# Patient Record
Sex: FEMALE | Race: WHITE | NOT HISPANIC OR LATINO | Employment: OTHER | URBAN - METROPOLITAN AREA
[De-identification: names, ages, dates, MRNs, and addresses within clinical notes are randomized per-mention and may not be internally consistent; named-entity substitution may affect disease eponyms.]

---

## 2017-12-22 ENCOUNTER — HOSPITAL ENCOUNTER (INPATIENT)
Facility: HOSPITAL | Age: 82
LOS: 7 days | Discharge: HOSPICE | DRG: 189 | End: 2017-12-29
Attending: EMERGENCY MEDICINE | Admitting: FAMILY MEDICINE
Payer: MEDICARE

## 2017-12-22 DIAGNOSIS — R07.9 CHEST PAIN: ICD-10-CM

## 2017-12-22 DIAGNOSIS — J18.9 PNEUMONIA OF LEFT LOWER LOBE DUE TO INFECTIOUS ORGANISM: Primary | ICD-10-CM

## 2017-12-22 DIAGNOSIS — J96.02 ACUTE RESPIRATORY FAILURE WITH HYPOXIA AND HYPERCARBIA: ICD-10-CM

## 2017-12-22 DIAGNOSIS — R07.89 CHEST DISCOMFORT: ICD-10-CM

## 2017-12-22 DIAGNOSIS — I50.9 CHF (CONGESTIVE HEART FAILURE): ICD-10-CM

## 2017-12-22 DIAGNOSIS — E11.00 UNCONTROLLED TYPE 2 DIABETES MELLITUS WITH HYPEROSMOLARITY WITHOUT COMA, WITH LONG-TERM CURRENT USE OF INSULIN: ICD-10-CM

## 2017-12-22 DIAGNOSIS — Z79.4 UNCONTROLLED TYPE 2 DIABETES MELLITUS WITH HYPEROSMOLARITY WITHOUT COMA, WITH LONG-TERM CURRENT USE OF INSULIN: ICD-10-CM

## 2017-12-22 DIAGNOSIS — J96.01 ACUTE RESPIRATORY FAILURE WITH HYPOXIA AND HYPERCARBIA: ICD-10-CM

## 2017-12-22 DIAGNOSIS — R79.89 ELEVATED TROPONIN: ICD-10-CM

## 2017-12-22 DIAGNOSIS — R06.02 SOB (SHORTNESS OF BREATH): ICD-10-CM

## 2017-12-22 LAB
ALBUMIN SERPL BCP-MCNC: 3 G/DL
ALLENS TEST: ABNORMAL
ALP SERPL-CCNC: 95 U/L
ALT SERPL W/O P-5'-P-CCNC: 14 U/L
AMORPH CRY URNS QL MICRO: ABNORMAL
ANION GAP SERPL CALC-SCNC: 16 MMOL/L
APTT BLDCRRT: 29.9 SEC
AST SERPL-CCNC: 34 U/L
BACTERIA #/AREA URNS HPF: ABNORMAL /HPF
BASOPHILS # BLD AUTO: 0.04 K/UL
BASOPHILS NFR BLD: 0.5 %
BILIRUB SERPL-MCNC: 0.3 MG/DL
BILIRUB UR QL STRIP: NEGATIVE
BNP SERPL-MCNC: 226 PG/ML
BUN SERPL-MCNC: 20 MG/DL
CALCIUM SERPL-MCNC: 8.5 MG/DL
CHLORIDE SERPL-SCNC: 98 MMOL/L
CLARITY UR: ABNORMAL
CO2 SERPL-SCNC: 20 MMOL/L
COLOR UR: YELLOW
CREAT SERPL-MCNC: 0.8 MG/DL
DELSYS: ABNORMAL
DIFFERENTIAL METHOD: ABNORMAL
EOSINOPHIL # BLD AUTO: 0 K/UL
EOSINOPHIL NFR BLD: 0.1 %
EP: 6
ERYTHROCYTE [DISTWIDTH] IN BLOOD BY AUTOMATED COUNT: 14.6 %
ERYTHROCYTE [SEDIMENTATION RATE] IN BLOOD BY WESTERGREN METHOD: 16 MM/H
EST. GFR  (AFRICAN AMERICAN): >60 ML/MIN/1.73 M^2
EST. GFR  (NON AFRICAN AMERICAN): >60 ML/MIN/1.73 M^2
FIO2: 50
FLUAV AG SPEC QL IA: NEGATIVE
FLUBV AG SPEC QL IA: NEGATIVE
GLUCOSE SERPL-MCNC: 272 MG/DL
GLUCOSE UR QL STRIP: ABNORMAL
GRAN CASTS #/AREA URNS LPF: 1 /LPF
HCO3 UR-SCNC: 26.6 MMOL/L (ref 24–28)
HCT VFR BLD AUTO: 42.1 %
HGB BLD-MCNC: 14.1 G/DL
HGB UR QL STRIP: ABNORMAL
HYALINE CASTS #/AREA URNS LPF: 2 /LPF
INR PPP: 0.9
IP: 10
KETONES UR QL STRIP: NEGATIVE
LACTATE SERPL-SCNC: 2 MMOL/L
LEUKOCYTE ESTERASE UR QL STRIP: NEGATIVE
LIPASE SERPL-CCNC: 6 U/L
LYMPHOCYTES # BLD AUTO: 1.1 K/UL
LYMPHOCYTES NFR BLD: 12.1 %
MAGNESIUM SERPL-MCNC: 2 MG/DL
MCH RBC QN AUTO: 30.5 PG
MCHC RBC AUTO-ENTMCNC: 33.5 G/DL
MCV RBC AUTO: 91 FL
MICROSCOPIC COMMENT: ABNORMAL
MODE: ABNORMAL
MONOCYTES # BLD AUTO: 0.5 K/UL
MONOCYTES NFR BLD: 5.5 %
NEUTROPHILS # BLD AUTO: 7.3 K/UL
NEUTROPHILS NFR BLD: 81.8 %
NITRITE UR QL STRIP: NEGATIVE
PCO2 BLDA: 52.3 MMHG (ref 35–45)
PH SMN: 7.32 [PH] (ref 7.35–7.45)
PH UR STRIP: 6 [PH] (ref 5–8)
PHOSPHATE SERPL-MCNC: 3.8 MG/DL
PLATELET # BLD AUTO: 177 K/UL
PMV BLD AUTO: 10.9 FL
PO2 BLDA: 70 MMHG (ref 80–100)
POC BE: 0 MMOL/L
POC SATURATED O2: 92 % (ref 95–100)
POTASSIUM SERPL-SCNC: 3.7 MMOL/L
PROT SERPL-MCNC: 7.2 G/DL
PROT UR QL STRIP: ABNORMAL
PROTHROMBIN TIME: 9.8 SEC
RBC # BLD AUTO: 4.62 M/UL
RBC #/AREA URNS HPF: 2 /HPF (ref 0–4)
SAMPLE: ABNORMAL
SITE: ABNORMAL
SODIUM SERPL-SCNC: 134 MMOL/L
SP GR UR STRIP: 1.02 (ref 1–1.03)
SP02: 95
SPECIMEN SOURCE: NORMAL
TROPONIN I SERPL DL<=0.01 NG/ML-MCNC: 0.1 NG/ML
TSH SERPL DL<=0.005 MIU/L-ACNC: 1.38 UIU/ML
URN SPEC COLLECT METH UR: ABNORMAL
UROBILINOGEN UR STRIP-ACNC: NEGATIVE EU/DL
WBC # BLD AUTO: 8.86 K/UL
WBC #/AREA URNS HPF: 1 /HPF (ref 0–5)

## 2017-12-22 PROCEDURE — 85025 COMPLETE CBC W/AUTO DIFF WBC: CPT

## 2017-12-22 PROCEDURE — 96365 THER/PROPH/DIAG IV INF INIT: CPT

## 2017-12-22 PROCEDURE — 84484 ASSAY OF TROPONIN QUANT: CPT

## 2017-12-22 PROCEDURE — 87400 INFLUENZA A/B EACH AG IA: CPT

## 2017-12-22 PROCEDURE — 82803 BLOOD GASES ANY COMBINATION: CPT

## 2017-12-22 PROCEDURE — 99285 EMERGENCY DEPT VISIT HI MDM: CPT

## 2017-12-22 PROCEDURE — 25000003 PHARM REV CODE 250: Performed by: EMERGENCY MEDICINE

## 2017-12-22 PROCEDURE — 96361 HYDRATE IV INFUSION ADD-ON: CPT

## 2017-12-22 PROCEDURE — 93005 ELECTROCARDIOGRAM TRACING: CPT

## 2017-12-22 PROCEDURE — 82533 TOTAL CORTISOL: CPT

## 2017-12-22 PROCEDURE — 99900035 HC TECH TIME PER 15 MIN (STAT)

## 2017-12-22 PROCEDURE — 87040 BLOOD CULTURE FOR BACTERIA: CPT | Mod: 59

## 2017-12-22 PROCEDURE — 87086 URINE CULTURE/COLONY COUNT: CPT

## 2017-12-22 PROCEDURE — 96375 TX/PRO/DX INJ NEW DRUG ADDON: CPT

## 2017-12-22 PROCEDURE — 85610 PROTHROMBIN TIME: CPT

## 2017-12-22 PROCEDURE — 81000 URINALYSIS NONAUTO W/SCOPE: CPT

## 2017-12-22 PROCEDURE — 11000001 HC ACUTE MED/SURG PRIVATE ROOM

## 2017-12-22 PROCEDURE — 36600 WITHDRAWAL OF ARTERIAL BLOOD: CPT

## 2017-12-22 PROCEDURE — 63600175 PHARM REV CODE 636 W HCPCS: Performed by: EMERGENCY MEDICINE

## 2017-12-22 PROCEDURE — 85730 THROMBOPLASTIN TIME PARTIAL: CPT

## 2017-12-22 PROCEDURE — 25000003 PHARM REV CODE 250: Performed by: NURSE PRACTITIONER

## 2017-12-22 PROCEDURE — 84443 ASSAY THYROID STIM HORMONE: CPT

## 2017-12-22 PROCEDURE — 80053 COMPREHEN METABOLIC PANEL: CPT

## 2017-12-22 PROCEDURE — 83690 ASSAY OF LIPASE: CPT

## 2017-12-22 PROCEDURE — 84100 ASSAY OF PHOSPHORUS: CPT

## 2017-12-22 PROCEDURE — 83605 ASSAY OF LACTIC ACID: CPT

## 2017-12-22 PROCEDURE — 83735 ASSAY OF MAGNESIUM: CPT

## 2017-12-22 PROCEDURE — 83880 ASSAY OF NATRIURETIC PEPTIDE: CPT

## 2017-12-22 PROCEDURE — 63600175 PHARM REV CODE 636 W HCPCS: Performed by: NURSE PRACTITIONER

## 2017-12-22 PROCEDURE — 93010 ELECTROCARDIOGRAM REPORT: CPT | Mod: ,,, | Performed by: INTERNAL MEDICINE

## 2017-12-22 RX ORDER — LEVOTHYROXINE SODIUM 100 UG/1
100 TABLET ORAL DAILY
COMMUNITY

## 2017-12-22 RX ORDER — LISINOPRIL 20 MG/1
40 TABLET ORAL NIGHTLY
Status: DISCONTINUED | OUTPATIENT
Start: 2017-12-23 | End: 2017-12-29 | Stop reason: HOSPADM

## 2017-12-22 RX ORDER — MEMANTINE HYDROCHLORIDE 5 MG/1
5 TABLET ORAL NIGHTLY
Status: DISCONTINUED | OUTPATIENT
Start: 2017-12-23 | End: 2017-12-29 | Stop reason: HOSPADM

## 2017-12-22 RX ORDER — SERTRALINE HYDROCHLORIDE 50 MG/1
50 TABLET, FILM COATED ORAL DAILY
Status: DISCONTINUED | OUTPATIENT
Start: 2017-12-23 | End: 2017-12-29 | Stop reason: HOSPADM

## 2017-12-22 RX ORDER — HYDROCHLOROTHIAZIDE 25 MG/1
25 TABLET ORAL DAILY
COMMUNITY

## 2017-12-22 RX ORDER — SIMVASTATIN 20 MG/1
20 TABLET, FILM COATED ORAL NIGHTLY
Status: DISCONTINUED | OUTPATIENT
Start: 2017-12-23 | End: 2017-12-29 | Stop reason: HOSPADM

## 2017-12-22 RX ORDER — LEVOTHYROXINE SODIUM 100 UG/1
100 TABLET ORAL
Status: DISCONTINUED | OUTPATIENT
Start: 2017-12-23 | End: 2017-12-29 | Stop reason: HOSPADM

## 2017-12-22 RX ORDER — ASPIRIN 325 MG
325 TABLET, DELAYED RELEASE (ENTERIC COATED) ORAL ONCE
Status: COMPLETED | OUTPATIENT
Start: 2017-12-22 | End: 2017-12-22

## 2017-12-22 RX ORDER — ENOXAPARIN SODIUM 100 MG/ML
40 INJECTION SUBCUTANEOUS EVERY 24 HOURS
Status: DISCONTINUED | OUTPATIENT
Start: 2017-12-23 | End: 2017-12-23

## 2017-12-22 RX ORDER — SIMVASTATIN 20 MG/1
20 TABLET, FILM COATED ORAL NIGHTLY
COMMUNITY

## 2017-12-22 RX ORDER — FUROSEMIDE 10 MG/ML
40 INJECTION INTRAMUSCULAR; INTRAVENOUS 2 TIMES DAILY
Status: DISCONTINUED | OUTPATIENT
Start: 2017-12-23 | End: 2017-12-22

## 2017-12-22 RX ORDER — FUROSEMIDE 10 MG/ML
40 INJECTION INTRAMUSCULAR; INTRAVENOUS 2 TIMES DAILY
Status: DISCONTINUED | OUTPATIENT
Start: 2017-12-23 | End: 2017-12-23

## 2017-12-22 RX ORDER — LORATADINE 10 MG/1
10 TABLET ORAL NIGHTLY
COMMUNITY

## 2017-12-22 RX ORDER — BALSALAZIDE DISODIUM 750 MG/1
750 CAPSULE ORAL 3 TIMES DAILY
COMMUNITY

## 2017-12-22 RX ORDER — AMLODIPINE BESYLATE 2.5 MG/1
5 TABLET ORAL DAILY
COMMUNITY

## 2017-12-22 RX ORDER — MEMANTINE HYDROCHLORIDE 5 MG/1
5 TABLET ORAL NIGHTLY
COMMUNITY

## 2017-12-22 RX ORDER — LISINOPRIL 40 MG/1
40 TABLET ORAL NIGHTLY
COMMUNITY

## 2017-12-22 RX ORDER — ACETAMINOPHEN 500 MG
2000 TABLET ORAL DAILY
COMMUNITY

## 2017-12-22 RX ORDER — DOXYCYCLINE HYCLATE 100 MG/1
100 TABLET, DELAYED RELEASE ORAL 2 TIMES DAILY
Status: ON HOLD | COMMUNITY
End: 2017-12-29 | Stop reason: HOSPADM

## 2017-12-22 RX ORDER — PANTOPRAZOLE SODIUM 40 MG/1
40 TABLET, DELAYED RELEASE ORAL DAILY
Status: DISCONTINUED | OUTPATIENT
Start: 2017-12-23 | End: 2017-12-29 | Stop reason: HOSPADM

## 2017-12-22 RX ORDER — INSULIN GLARGINE 100 [IU]/ML
21 INJECTION, SOLUTION SUBCUTANEOUS NIGHTLY
COMMUNITY

## 2017-12-22 RX ORDER — OMEPRAZOLE 20 MG/1
20 CAPSULE, DELAYED RELEASE ORAL 2 TIMES DAILY
COMMUNITY

## 2017-12-22 RX ORDER — SERTRALINE HYDROCHLORIDE 50 MG/1
50 TABLET, FILM COATED ORAL DAILY
COMMUNITY

## 2017-12-22 RX ORDER — FLUTICASONE PROPIONATE 110 UG/1
2 AEROSOL, METERED RESPIRATORY (INHALATION) 2 TIMES DAILY
COMMUNITY

## 2017-12-22 RX ORDER — AMLODIPINE BESYLATE 5 MG/1
5 TABLET ORAL DAILY
Status: DISCONTINUED | OUTPATIENT
Start: 2017-12-23 | End: 2017-12-24

## 2017-12-22 RX ORDER — FUROSEMIDE 10 MG/ML
40 INJECTION INTRAMUSCULAR; INTRAVENOUS ONCE
Status: COMPLETED | OUTPATIENT
Start: 2017-12-22 | End: 2017-12-22

## 2017-12-22 RX ORDER — INSULIN ASPART 100 [IU]/ML
INJECTION, SOLUTION INTRAVENOUS; SUBCUTANEOUS
COMMUNITY

## 2017-12-22 RX ADMIN — ASPIRIN 325 MG: 325 TABLET, DELAYED RELEASE ORAL at 11:12

## 2017-12-22 RX ADMIN — FUROSEMIDE 40 MG: 10 INJECTION, SOLUTION INTRAMUSCULAR; INTRAVENOUS at 11:12

## 2017-12-22 RX ADMIN — PIPERACILLIN AND TAZOBACTAM 4.5 G: 4; .5 INJECTION, POWDER, FOR SOLUTION INTRAVENOUS at 11:12

## 2017-12-22 RX ADMIN — SODIUM CHLORIDE 1000 ML: 0.9 INJECTION, SOLUTION INTRAVENOUS at 10:12

## 2017-12-23 PROBLEM — I21.4 NSTEMI (NON-ST ELEVATED MYOCARDIAL INFARCTION): Status: ACTIVE | Noted: 2017-12-22

## 2017-12-23 LAB
ALLENS TEST: ABNORMAL
ANION GAP SERPL CALC-SCNC: 11 MMOL/L
APTT BLDCRRT: 38 SEC
APTT BLDCRRT: NORMAL SEC
BASOPHILS # BLD AUTO: 0.02 K/UL
BASOPHILS NFR BLD: 0.4 %
BUN SERPL-MCNC: 20 MG/DL
CALCIUM SERPL-MCNC: 7.7 MG/DL
CHLORIDE SERPL-SCNC: 101 MMOL/L
CHOLEST SERPL-MCNC: 126 MG/DL
CHOLEST/HDLC SERPL: 2.6 {RATIO}
CK MB SERPL-MCNC: 12.7 NG/ML
CK MB SERPL-RTO: 3.7 %
CK SERPL-CCNC: 341 U/L
CK SERPL-CCNC: 341 U/L
CO2 SERPL-SCNC: 24 MMOL/L
CORTIS SERPL-MCNC: 34.6 UG/DL
CREAT SERPL-MCNC: 0.8 MG/DL
DELSYS: ABNORMAL
DIFFERENTIAL METHOD: ABNORMAL
EOSINOPHIL # BLD AUTO: 0 K/UL
EOSINOPHIL NFR BLD: 0 %
ERYTHROCYTE [DISTWIDTH] IN BLOOD BY AUTOMATED COUNT: 14.4 %
EST. GFR  (AFRICAN AMERICAN): >60 ML/MIN/1.73 M^2
EST. GFR  (NON AFRICAN AMERICAN): >60 ML/MIN/1.73 M^2
ESTIMATED AVG GLUCOSE: 157 MG/DL
ESTIMATED PA SYSTOLIC PRESSURE: 39.44
FIO2: 40
GLUCOSE SERPL-MCNC: 204 MG/DL
HBA1C MFR BLD HPLC: 7.1 %
HCO3 UR-SCNC: 27.2 MMOL/L (ref 24–28)
HCT VFR BLD AUTO: 33.1 %
HDLC SERPL-MCNC: 49 MG/DL
HDLC SERPL: 38.9 %
HGB BLD-MCNC: 10.8 G/DL
INR PPP: 1
LACTATE SERPL-SCNC: 1 MMOL/L
LDLC SERPL CALC-MCNC: 64 MG/DL
LYMPHOCYTES # BLD AUTO: 0.5 K/UL
LYMPHOCYTES NFR BLD: 9.6 %
MCH RBC QN AUTO: 29.4 PG
MCHC RBC AUTO-ENTMCNC: 32.6 G/DL
MCV RBC AUTO: 90 FL
MODE: ABNORMAL
MONOCYTES # BLD AUTO: 0.3 K/UL
MONOCYTES NFR BLD: 5.4 %
NEUTROPHILS # BLD AUTO: 4.7 K/UL
NEUTROPHILS NFR BLD: 84.6 %
NONHDLC SERPL-MCNC: 77 MG/DL
PCO2 BLDA: 43.6 MMHG (ref 35–45)
PH SMN: 7.4 [PH] (ref 7.35–7.45)
PLATELET # BLD AUTO: 156 K/UL
PMV BLD AUTO: 10 FL
PO2 BLDA: 59 MMHG (ref 80–100)
POC BE: 2 MMOL/L
POC SATURATED O2: 90 % (ref 95–100)
POCT GLUCOSE: 137 MG/DL (ref 70–110)
POCT GLUCOSE: 142 MG/DL (ref 70–110)
POCT GLUCOSE: 195 MG/DL (ref 70–110)
POTASSIUM SERPL-SCNC: 3.3 MMOL/L
PROCALCITONIN SERPL IA-MCNC: 0.57 NG/ML
PROCALCITONIN SERPL IA-MCNC: 0.62 NG/ML
PROTHROMBIN TIME: 10 SEC
RBC # BLD AUTO: 3.67 M/UL
RETIRED EF AND QEF - SEE NOTES: 60 (ref 55–65)
SAMPLE: ABNORMAL
SITE: ABNORMAL
SODIUM SERPL-SCNC: 136 MMOL/L
TRICUSPID VALVE REGURGITATION: NORMAL
TRIGL SERPL-MCNC: 65 MG/DL
TROPONIN I SERPL DL<=0.01 NG/ML-MCNC: 5.15 NG/ML
TROPONIN I SERPL DL<=0.01 NG/ML-MCNC: 5.28 NG/ML
TROPONIN I SERPL DL<=0.01 NG/ML-MCNC: 5.57 NG/ML
WBC # BLD AUTO: 5.51 K/UL

## 2017-12-23 PROCEDURE — 99900035 HC TECH TIME PER 15 MIN (STAT)

## 2017-12-23 PROCEDURE — 85025 COMPLETE CBC W/AUTO DIFF WBC: CPT

## 2017-12-23 PROCEDURE — 93005 ELECTROCARDIOGRAM TRACING: CPT

## 2017-12-23 PROCEDURE — 94640 AIRWAY INHALATION TREATMENT: CPT

## 2017-12-23 PROCEDURE — 25000003 PHARM REV CODE 250: Performed by: NURSE PRACTITIONER

## 2017-12-23 PROCEDURE — 99222 1ST HOSP IP/OBS MODERATE 55: CPT | Mod: ,,, | Performed by: INTERNAL MEDICINE

## 2017-12-23 PROCEDURE — 82553 CREATINE MB FRACTION: CPT

## 2017-12-23 PROCEDURE — 36600 WITHDRAWAL OF ARTERIAL BLOOD: CPT

## 2017-12-23 PROCEDURE — 80048 BASIC METABOLIC PNL TOTAL CA: CPT

## 2017-12-23 PROCEDURE — 25000003 PHARM REV CODE 250: Performed by: INTERNAL MEDICINE

## 2017-12-23 PROCEDURE — 93010 ELECTROCARDIOGRAM REPORT: CPT | Mod: ,,, | Performed by: INTERNAL MEDICINE

## 2017-12-23 PROCEDURE — 84145 PROCALCITONIN (PCT): CPT | Mod: 91

## 2017-12-23 PROCEDURE — 85610 PROTHROMBIN TIME: CPT

## 2017-12-23 PROCEDURE — 27000190 HC CPAP FULL FACE MASK W/VALVE

## 2017-12-23 PROCEDURE — 93306 TTE W/DOPPLER COMPLETE: CPT

## 2017-12-23 PROCEDURE — 36415 COLL VENOUS BLD VENIPUNCTURE: CPT

## 2017-12-23 PROCEDURE — 99291 CRITICAL CARE FIRST HOUR: CPT | Mod: ,,, | Performed by: INTERNAL MEDICINE

## 2017-12-23 PROCEDURE — 84484 ASSAY OF TROPONIN QUANT: CPT | Mod: 91

## 2017-12-23 PROCEDURE — 80061 LIPID PANEL: CPT

## 2017-12-23 PROCEDURE — 25000242 PHARM REV CODE 250 ALT 637 W/ HCPCS: Performed by: INTERNAL MEDICINE

## 2017-12-23 PROCEDURE — 84145 PROCALCITONIN (PCT): CPT

## 2017-12-23 PROCEDURE — 25000003 PHARM REV CODE 250

## 2017-12-23 PROCEDURE — 94660 CPAP INITIATION&MGMT: CPT

## 2017-12-23 PROCEDURE — 63600175 PHARM REV CODE 636 W HCPCS: Performed by: NURSE PRACTITIONER

## 2017-12-23 PROCEDURE — 82803 BLOOD GASES ANY COMBINATION: CPT

## 2017-12-23 PROCEDURE — 84484 ASSAY OF TROPONIN QUANT: CPT

## 2017-12-23 PROCEDURE — 93306 TTE W/DOPPLER COMPLETE: CPT | Mod: 26,,, | Performed by: INTERNAL MEDICINE

## 2017-12-23 PROCEDURE — 20000000 HC ICU ROOM

## 2017-12-23 PROCEDURE — 27000221 HC OXYGEN, UP TO 24 HOURS

## 2017-12-23 PROCEDURE — 83036 HEMOGLOBIN GLYCOSYLATED A1C: CPT

## 2017-12-23 PROCEDURE — 83605 ASSAY OF LACTIC ACID: CPT

## 2017-12-23 PROCEDURE — 85730 THROMBOPLASTIN TIME PARTIAL: CPT | Mod: 91

## 2017-12-23 RX ORDER — FUROSEMIDE 10 MG/ML
40 INJECTION INTRAMUSCULAR; INTRAVENOUS 2 TIMES DAILY
Status: COMPLETED | OUTPATIENT
Start: 2017-12-23 | End: 2017-12-24

## 2017-12-23 RX ORDER — ACETAMINOPHEN 325 MG/1
650 TABLET ORAL EVERY 6 HOURS PRN
Status: DISCONTINUED | OUTPATIENT
Start: 2017-12-23 | End: 2017-12-29 | Stop reason: HOSPADM

## 2017-12-23 RX ORDER — IBUPROFEN 200 MG
24 TABLET ORAL
Status: DISCONTINUED | OUTPATIENT
Start: 2017-12-23 | End: 2017-12-29 | Stop reason: HOSPADM

## 2017-12-23 RX ORDER — POTASSIUM CHLORIDE 20 MEQ/1
40 TABLET, EXTENDED RELEASE ORAL ONCE
Status: COMPLETED | OUTPATIENT
Start: 2017-12-23 | End: 2017-12-23

## 2017-12-23 RX ORDER — HEPARIN SODIUM,PORCINE/D5W 25000/250
16 INTRAVENOUS SOLUTION INTRAVENOUS CONTINUOUS
Status: DISCONTINUED | OUTPATIENT
Start: 2017-12-23 | End: 2017-12-25

## 2017-12-23 RX ORDER — IBUPROFEN 200 MG
16 TABLET ORAL
Status: DISCONTINUED | OUTPATIENT
Start: 2017-12-23 | End: 2017-12-29 | Stop reason: HOSPADM

## 2017-12-23 RX ORDER — FUROSEMIDE 10 MG/ML
60 INJECTION INTRAMUSCULAR; INTRAVENOUS ONCE
Status: COMPLETED | OUTPATIENT
Start: 2017-12-23 | End: 2017-12-23

## 2017-12-23 RX ORDER — POTASSIUM CHLORIDE 20 MEQ/15ML
40 SOLUTION ORAL ONCE
Status: COMPLETED | OUTPATIENT
Start: 2017-12-23 | End: 2017-12-23

## 2017-12-23 RX ORDER — INSULIN ASPART 100 [IU]/ML
1-10 INJECTION, SOLUTION INTRAVENOUS; SUBCUTANEOUS
Status: DISCONTINUED | OUTPATIENT
Start: 2017-12-23 | End: 2017-12-29 | Stop reason: HOSPADM

## 2017-12-23 RX ORDER — GLUCAGON 1 MG
1 KIT INJECTION
Status: DISCONTINUED | OUTPATIENT
Start: 2017-12-23 | End: 2017-12-29 | Stop reason: HOSPADM

## 2017-12-23 RX ORDER — IPRATROPIUM BROMIDE AND ALBUTEROL SULFATE 2.5; .5 MG/3ML; MG/3ML
3 SOLUTION RESPIRATORY (INHALATION) EVERY 6 HOURS PRN
Status: DISCONTINUED | OUTPATIENT
Start: 2017-12-23 | End: 2017-12-29 | Stop reason: HOSPADM

## 2017-12-23 RX ADMIN — LISINOPRIL 40 MG: 20 TABLET ORAL at 09:12

## 2017-12-23 RX ADMIN — MEMANTINE HYDROCHLORIDE 5 MG: 5 TABLET ORAL at 09:12

## 2017-12-23 RX ADMIN — PANTOPRAZOLE SODIUM 40 MG: 40 TABLET, DELAYED RELEASE ORAL at 08:12

## 2017-12-23 RX ADMIN — INSULIN DETEMIR 6 UNITS: 100 INJECTION, SOLUTION SUBCUTANEOUS at 10:12

## 2017-12-23 RX ADMIN — FUROSEMIDE 40 MG: 10 INJECTION, SOLUTION INTRAMUSCULAR; INTRAVENOUS at 05:12

## 2017-12-23 RX ADMIN — VANCOMYCIN HYDROCHLORIDE 1000 MG: 1 INJECTION, POWDER, LYOPHILIZED, FOR SOLUTION INTRAVENOUS at 12:12

## 2017-12-23 RX ADMIN — NITROGLYCERIN 1 INCH: 20 OINTMENT TOPICAL at 12:12

## 2017-12-23 RX ADMIN — IPRATROPIUM BROMIDE AND ALBUTEROL SULFATE 3 ML: .5; 3 SOLUTION RESPIRATORY (INHALATION) at 02:12

## 2017-12-23 RX ADMIN — AMLODIPINE BESYLATE 5 MG: 5 TABLET ORAL at 08:12

## 2017-12-23 RX ADMIN — NITROGLYCERIN 1 INCH: 20 OINTMENT TOPICAL at 05:12

## 2017-12-23 RX ADMIN — FUROSEMIDE 60 MG: 10 INJECTION, SOLUTION INTRAMUSCULAR; INTRAVENOUS at 08:12

## 2017-12-23 RX ADMIN — MOXIFLOXACIN HYDROCHLORIDE 400 MG: 400 INJECTION, SOLUTION INTRAVENOUS at 12:12

## 2017-12-23 RX ADMIN — POTASSIUM CHLORIDE 40 MEQ: 20 SOLUTION ORAL at 08:12

## 2017-12-23 RX ADMIN — HEPARIN SODIUM AND DEXTROSE 16 UNITS/KG/HR: 10000; 5 INJECTION INTRAVENOUS at 10:12

## 2017-12-23 RX ADMIN — LEVOTHYROXINE SODIUM 100 MCG: 100 TABLET ORAL at 05:12

## 2017-12-23 RX ADMIN — INSULIN DETEMIR 6 UNITS: 100 INJECTION, SOLUTION SUBCUTANEOUS at 08:12

## 2017-12-23 RX ADMIN — SERTRALINE HYDROCHLORIDE 50 MG: 50 TABLET ORAL at 08:12

## 2017-12-23 RX ADMIN — SIMVASTATIN 20 MG: 20 TABLET, FILM COATED ORAL at 09:12

## 2017-12-23 RX ADMIN — ACETAMINOPHEN 650 MG: 325 TABLET ORAL at 10:12

## 2017-12-23 NOTE — PLAN OF CARE
Problem: Patient Care Overview  Goal: Plan of Care Review  Outcome: Ongoing (interventions implemented as appropriate)  Patient remained on bipap during the night.  She has denied any chest pain during the night.  Vancomycin and Avelox given upon arrival to floor.  See MAR for medication doses and times of adminstraton.

## 2017-12-23 NOTE — HPI
Ms Reyes is 89 year old female admitted to MICU for SOB  Hx obtained from Son.  Recent SOB, chest pressure and congestion, seen [facility] after hours, CXR reviewed, given Neb and Doxycycline  Declined at home with respiratory distress, and EMS called for [facility]  Low grade fever, chest heaviness and Elevated BP SBP >190   ABGs pH7.315, CO2 52.3, PO2 70. Chest Xray: Patchy infiltrates scattered about the left lung.    The patient was placed on Bipap in ER with improvement in symptoms per patient.   No Hox of COPD   Old Dx of Sarcoidosis: lives in [place]  Is DNR per son

## 2017-12-23 NOTE — PROGRESS NOTES
Pt complaining of increased shortness of breath.  Pt repositioned in bed and placed on Bipap.  Rt at bedside with neb.  Pt reports improved work of breathing with Bipap.  Will cont to monitor.        Rodger MATTA

## 2017-12-23 NOTE — ED NOTES
Report given to Monserrat MATTA. NAD noted. RR e/u, airway open and patent. POC discussed with pt and family. Pt remains on Bipap and cardiac monitor. VSS. Will continue with admission.

## 2017-12-23 NOTE — PROGRESS NOTES
Pt's troponin elevated this am.  MD notified.  Pt denies chest pain when asked but confirms pressure if asked.  12 lead ekg obtained.  Plan of care reviewed w pt and son.    Rodger MATTA

## 2017-12-23 NOTE — ASSESSMENT & PLAN NOTE
Bilateral infiltrates appear chronic without privilege of old films  [place] more likely volume overload  Empiric abx: Moxifloxacin   Sputum and blood cultures pending  Swallow eval

## 2017-12-23 NOTE — SUBJECTIVE & OBJECTIVE
Past Medical History:   Diagnosis Date    Diabetes mellitus     High cholesterol     Hypertension     Hypothyroid     Irritable bowel disease     Neuropathy     Osteoporosis     Sarcoidosis        Past Surgical History:   Procedure Laterality Date    CHOLECYSTECTOMY      HYSTERECTOMY         Review of patient's allergies indicates:  No Known Allergies    Family History     None        Social History Main Topics    Smoking status: Never Smoker    Smokeless tobacco: Never Used    Alcohol use No    Drug use: No    Sexual activity: Not on file         Review of Systems   Constitutional: Positive for fatigue.   Eyes: Negative.    Respiratory: Positive for cough, chest tightness and shortness of breath.    Cardiovascular: Negative.    Gastrointestinal: Negative.    Endocrine: Negative.    Genitourinary: Negative.    Musculoskeletal: Negative.    Neurological: Negative.    Hematological: Negative.    Psychiatric/Behavioral: Negative.      Objective:     Vital Signs (Most Recent):  Temp: 97.8 °F (36.6 °C) (12/23/17 0705)  Pulse: (!) 59 (12/23/17 1000)  Resp: (!) 22 (12/23/17 1000)  BP: (!) 131/100 (12/23/17 1000)  SpO2: (!) 88 % (12/23/17 1000) Vital Signs (24h Range):  Temp:  [96.3 °F (35.7 °C)-98 °F (36.7 °C)] 97.8 °F (36.6 °C)  Pulse:  [51-85] 59  Resp:  [15-31] 22  SpO2:  [88 %-98 %] 88 %  BP: (100-193)/() 131/100     Weight: 71 kg (156 lb 8.4 oz)  Body mass index is 26.87 kg/m².      Intake/Output Summary (Last 24 hours) at 12/23/17 1025  Last data filed at 12/23/17 1009   Gross per 24 hour   Intake              660 ml   Output             1735 ml   Net            -1075 ml       Physical Exam   Constitutional: She is oriented to person, place, and time. She appears well-developed and well-nourished.   HENT:   Head: Normocephalic and atraumatic.   Nose: Nose normal.   Mouth/Throat: Oropharynx is clear and moist. No oropharyngeal exudate.   Eyes: Conjunctivae and EOM are normal. Pupils are equal,  round, and reactive to light. Left eye exhibits no discharge. No scleral icterus.   Neck: Normal range of motion. No JVD present. No tracheal deviation present. No thyromegaly present.   Cardiovascular: Normal rate, regular rhythm and normal heart sounds.    No murmur heard.  Pulmonary/Chest: Effort normal. She has wheezes. She has rales.   Abdominal: Soft. Bowel sounds are normal.   Genitourinary:   Genitourinary Comments: hawkins   Musculoskeletal: Normal range of motion. She exhibits no edema or deformity.   Neurological: She is alert and oriented to person, place, and time.   Skin: Skin is warm and dry. Capillary refill takes 2 to 3 seconds.   Psychiatric: She has a normal mood and affect.   Nursing note and vitals reviewed.      Vents:  Oxygen Concentration (%): 40 (12/23/17 1000)    Lines/Drains/Airways     Drain                 Urethral Catheter 12/22/17 2230 Non-latex 16 Fr. less than 1 day          Peripheral Intravenous Line                 Peripheral IV - Single Lumen 12/22/17 Right Forearm 1 day         Peripheral IV - Single Lumen 12/22/17 2210 Left Forearm less than 1 day                Significant Labs:    CBC/Anemia Profile:    Recent Labs  Lab 12/22/17 2140 12/23/17  0539   WBC 8.86 5.51   HGB 14.1 10.8*   HCT 42.1 33.1*    156   MCV 91 90   RDW 14.6* 14.4        Chemistries:    Recent Labs  Lab 12/22/17 2140 12/23/17  0539   * 136   K 3.7 3.3*   CL 98 101   CO2 20* 24   BUN 20 20   CREATININE 0.8 0.8   CALCIUM 8.5* 7.7*   ALBUMIN 3.0*  --    PROT 7.2  --    BILITOT 0.3  --    ALKPHOS 95  --    ALT 14  --    AST 34  --    MG 2.0  --    PHOS 3.8  --        ABGs:   Recent Labs  Lab 12/22/17 2151   PH 7.315*   PCO2 52.3*   HCO3 26.6   POCSATURATED 92*   BE 0     Blood Culture:   Recent Labs  Lab 12/22/17 2210   LABBLOO No Growth to date  No Growth to date     POCT Glucose:   Recent Labs  Lab 12/23/17  0545   POCTGLUCOSE 195*     Respiratory Culture: No results for input(s): GSRESP,  RESPIRATORYC in the last 48 hours.  All pertinent labs within the past 24 hours have been reviewed.    Significant Imaging:   I have reviewed and interpreted all pertinent imaging results/findings within the past 24 hours.     CXR        CXR  Normal heart size. Patchy infiltrates throughout left lung appear unchanged from prior day. There is coarsening of bronchovascular markings bilaterally. Mild patchy infiltrates in the right mid and lower lung.   Impression      No significant change. Patchy bilateral infiltrates, left greater than right.     ECHO  1 - Concentric hypertrophy.     2 - No wall motion abnormalities.     3 - Normal left ventricular systolic function (EF 60-65%).     4 - Normal right ventricular systolic function .     5 - The estimated PA systolic pressure is greater than 39 mmHg.     6 - Mild tricuspid regurgitation.

## 2017-12-23 NOTE — SUBJECTIVE & OBJECTIVE
Past Medical History:   Diagnosis Date    Diabetes mellitus     High cholesterol     Hypertension     Hypothyroid     Irritable bowel disease     Neuropathy     Osteoporosis     Sarcoidosis        Past Surgical History:   Procedure Laterality Date    CHOLECYSTECTOMY      HYSTERECTOMY         Review of patient's allergies indicates:  No Known Allergies    No current facility-administered medications on file prior to encounter.      No current outpatient prescriptions on file prior to encounter.     Family History     None        Social History Main Topics    Smoking status: Never Smoker    Smokeless tobacco: Never Used    Alcohol use No    Drug use: No    Sexual activity: Not on file     Review of Systems   All other systems reviewed and are negative.    Objective:     Vital Signs (Most Recent):  Temp: 97.8 °F (36.6 °C) (12/23/17 1105)  Pulse: 73 (12/23/17 1415)  Resp: (!) 23 (12/23/17 1415)  BP: (!) 152/33 (12/23/17 1105)  SpO2: 97 % (12/23/17 1415) Vital Signs (24h Range):  Temp:  [96.3 °F (35.7 °C)-98 °F (36.7 °C)] 97.8 °F (36.6 °C)  Pulse:  [51-85] 73  Resp:  [15-31] 23  SpO2:  [88 %-98 %] 97 %  BP: (100-193)/() 152/33     Weight: 71 kg (156 lb 8.4 oz)  Body mass index is 26.87 kg/m².    SpO2: 97 %  O2 Device (Oxygen Therapy): (S) venti mask      Intake/Output Summary (Last 24 hours) at 12/23/17 1421  Last data filed at 12/23/17 1100   Gross per 24 hour   Intake            709.7 ml   Output             1960 ml   Net          -1250.3 ml       Lines/Drains/Airways     Drain                 Urethral Catheter 12/22/17 2230 Non-latex 16 Fr. less than 1 day          Peripheral Intravenous Line                 Peripheral IV - Single Lumen 12/22/17 Right Forearm 1 day         Peripheral IV - Single Lumen 12/22/17 2210 Left Forearm less than 1 day                Physical Exam   Constitutional: She is oriented to person, place, and time. She appears well-developed and well-nourished.   HENT:   Head:  Normocephalic and atraumatic.   Eyes: Conjunctivae and EOM are normal. Pupils are equal, round, and reactive to light.   Neck: Normal range of motion. Neck supple.   Cardiovascular: Normal rate, regular rhythm, normal heart sounds and intact distal pulses.    Pulmonary/Chest: Effort normal. She has rales.   Abdominal: Soft. Bowel sounds are normal.   Musculoskeletal: Normal range of motion.   Neurological: She is alert and oriented to person, place, and time.   Skin: Skin is warm and dry.   Psychiatric: She has a normal mood and affect.   Nursing note and vitals reviewed.      Significant Labs: All pertinent lab results from the last 24 hours have been reviewed.    Significant Imaging: X-Ray: CXR: X-Ray Chest 1 View (CXR):   Results for orders placed or performed during the hospital encounter of 12/22/17   X-Ray Chest 1 View    Narrative    History: Shortness of breath    Normal heart size. Patchy infiltrates throughout left lung appear unchanged from prior day. There is coarsening of bronchovascular markings bilaterally. Mild patchy infiltrates in the right mid and lower lung.    Impression     No significant change. Patchy bilateral infiltrates, left greater than right.      Electronically signed by: IRIS MERRITT MD  Date:     12/23/17  Time:    08:55

## 2017-12-23 NOTE — HPI
"Olivia Reyes is a 89 y.o. female patient who presented to the Emergency Department for SOB. Onset gradually 1 week ago. Symptoms constant and worsening. Patient reports over past week that she has been noticing increaed SOB with excursion. Prior treatment neb, evaluation at [facility] after hours, and 1 doxycycline. The  states that the pt "deteriorated 3 hours later", which prompted the ED visit.  No mitigating or exacerbating factors reported. Associated sxs include chest pain and "low grade" fever (99.5). En route, pt was placed on CPAP by EMS.  denies any diaphoresis, dizziness, focal weakness/ numbness, cough, palpitations, leg swelling, n/v/d, and all other sxs at this time. No further complaints or concerns at this time. The patient was evaluated in the Er, CBC neg, CMP notable for hypergycemia and CO2 20. Troponin and BNP elevated. UA neg. ABGs pH7.315, CO2 52.3, PO2 70. Chest Xray: Patchy infiltrates scattered about the left lung.  Question of a mild infiltrate right lung base.  Heart size within normal limits.  Vascular calcification of aorta.  No significant bony findings. The patient was placed on Bipap in ER with improvement in symptoms per patient. The patient admitted to ICU with Bipap for Acute Resp Failure with hypoxemia and hypercap.   "

## 2017-12-23 NOTE — PLAN OF CARE
Problem: Patient Care Overview  Goal: Plan of Care Review  Outcome: Ongoing (interventions implemented as appropriate)  Pt remains on bipap at this time.  Pt resting quietly now.  Plan of care reviewed w pt and son.

## 2017-12-23 NOTE — CONSULTS
Preliminary consult note          89 y/u female with PMHx with PMhx for HTN, HLP, DM presents with CHf and possible pneumonia.   Pt with atypical CP and (+) troponin. EKG is unremarkable and echo is normal.  Recommend continue IV diuresis, IV heparin x 48 hours, asa, statin, b blockers. Would opt for conservative therapy , lexiscan stress once diuresed.

## 2017-12-23 NOTE — ED NOTES
Vancomycin and Avelox not loaded in pyxis. Pharmacy notified. Pharmacy states that they will bring it to ICU.

## 2017-12-23 NOTE — HPI
Ms. Reyes is a 89 year old female patient who presented to [facility] ED on 12/23/17 with acute respiratory failure, pneumonia, CHF, and NSTEMI.

## 2017-12-23 NOTE — HOSPITAL COURSE
Treated with lasix  On Nasal canula  Heparin GTT started  EKG reviewed    12/24: Procalcitonin 0.62 and Trop 3.316  Required BIPAP overnight , adequate diuresis  Cough rattles    12/25: Clinically and hemodynamically stable. IV ZOSYN, AVELOX.     12/26: Clinically and hemodynamically stable. Tolerated VAPOTHERM. Off IV heparin. IV ZOSYN + ZYVOX.     12/27: Clinically and hemodynamically stable. VAPOTHERM, ZOSYN, ZYVOX.    12/29: Clinically and hemodynamically stable.

## 2017-12-23 NOTE — PROGRESS NOTES
Took pt off bipap for trial and placed on NC 3lpm with MD and family at bedside. Pt said she feels better but 02 Sat dropped to 90%, bipap put back on with consent from pt, Sat 95%, pt resting quietly, family at bedside.

## 2017-12-23 NOTE — CONSULTS
[facility]  Cardiology  Consult Note    Patient Name: Olivia Reyes  MRN: [id number]  Admission Date: 12/22/2017  Hospital Length of Stay: 1 days  Code Status: DNR   Attending Provider: Polly Garcia MD   Consulting Provider: Nathan Nolasco MD  Primary Care Physician: No primary care provider on file.  Principal Problem:Acute respiratory failure with hypoxia and hypercarbia    Patient information was obtained from patient and ER records.     Consults  Subjective:     Chief Complaint:  SOB     HPI:   Consult Note  Cardiology    Consult Requested By:  Reason for Consult:     SUBJECTIVE:     History of Present Illness:  Patient is a 89 y.o. female presents with     89 y/u female with PMHx with PMhx for HTN, HLP, DM presents with CHf and possible pneumonia.   Pt with atypical CP and (+) troponin. EKG is unremarkable and echo is normal.  Recommend continue IV diuresis, IV heparin x 48 hours, asa, plavix, statin, b blockers. Would opt for conservative therapy , lexiscan stress once diuresed    Review of patient's allergies indicates:  No Known Allergies    Past Medical History:   Diagnosis Date    Diabetes mellitus     High cholesterol     Hypertension     Hypothyroid     Irritable bowel disease     Neuropathy     Osteoporosis     Sarcoidosis      Past Surgical History:   Procedure Laterality Date    CHOLECYSTECTOMY      HYSTERECTOMY       History reviewed. No pertinent family history.  Social History   Substance Use Topics    Smoking status: Never Smoker    Smokeless tobacco: Never Used    Alcohol use No        Review of Systems:  Constitutional: negative  Eyes: negative  Ears, nose, mouth, throat, and face: negative  Respiratory: negative  Cardiovascular: negative  Gastrointestinal: negative  Genitourinary:negative  Hematologic/lymphatic: negative  Musculoskeletal:negative,   Neurological: negative  Behavioral/Psych: negative  Endocrine: negative  Allergic/Immunologic:  negative    OBJECTIVE:     Vital Signs (Most Recent)  Temp: 97.8 °F (36.6 °C) (12/23/17 1105)  Pulse: 73 (12/23/17 1415)  Resp: (!) 23 (12/23/17 1415)  BP: (!) 152/33 (12/23/17 1105)  SpO2: 97 % (12/23/17 1415)    Vital Signs Range (Last 24H):  Temp:  [96.3 °F (35.7 °C)-98 °F (36.7 °C)]   Pulse:  [51-85]   Resp:  [15-31]   BP: (100-193)/()   SpO2:  [88 %-98 %]     Current Facility-Administered Medications   Medication    albuterol-ipratropium 2.5mg-0.5mg/3mL nebulizer solution 3 mL    amLODIPine tablet 5 mg    dextrose 50% injection 12.5 g    dextrose 50% injection 25 g    furosemide injection 40 mg    glucagon (human recombinant) injection 1 mg    glucose chewable tablet 16 g    glucose chewable tablet 24 g    heparin 25,000 units in dextrose 5% 250 mL (100 units/mL) bolus from bag; PRN BOLUS    heparin 25,000 units in dextrose 5% 250 mL (100 units/mL) bolus from bag; PRN BOLUS    heparin 25,000 units in dextrose 5% 250 mL (100 units/mL) infusion; FEMALE    insulin aspart pen 1-10 Units    insulin detemir pen 6 Units    levothyroxine tablet 100 mcg    lisinopril tablet 40 mg    memantine tablet 5 mg    moxifloxacin 400 mg/250 mL IVPB 400 mg    nitroGLYCERIN 2% TD oint ointment 1 inch    pantoprazole EC tablet 40 mg    sertraline tablet 50 mg    simvastatin tablet 20 mg     No current facility-administered medications on file prior to encounter.      No current outpatient prescriptions on file prior to encounter.       Physical Exam:  General appearance: alert, appears stated age and cooperative  Head: Normocephalic, without obvious abnormality, atraumatic  Eyes:  conjunctivae/corneas clear. PERRL, EOM's intact. Fundi benign.  Nose: no discharge  Throat: normal findings: tongue midline and normal  Neck: no adenopathy, no carotid bruit, no JVD, supple, symmetrical, trachea midline and thyroid not enlarged, symmetric, no tenderness/mass/nodules  Back:  no skin lesions, erythema, or  scars  Lungs:  clear to auscultation bilaterally  Chest wall: no tenderness  Heart: regular rate and rhythm, S1, S2 normal, no murmur, click, rub or gallop  Abdomen: soft, non-tender; bowel sounds normal; no masses,  no organomegaly  Extremities: extremities normal, atraumatic, no cyanosis or edema  Pulses: 2+ and symmetric  Skin: Skin color, texture, turgor normal. No rashes or lesions  Neurologic: Grossly normal    Laboratory:  Chemistry:   Lab Results   Component Value Date     12/23/2017    K 3.3 (L) 12/23/2017     12/23/2017    CO2 24 12/23/2017    BUN 20 12/23/2017    CREATININE 0.8 12/23/2017    CALCIUM 7.7 (L) 12/23/2017     Cardiac Markers:   Lab Results   Component Value Date    TROPONINI 5.573 (H) 12/23/2017     Cardiac Markers (Last 3):   Lab Results   Component Value Date    TROPONINI 5.573 (H) 12/23/2017    TROPONINI 5.284 (H) 12/23/2017    TROPONINI 0.101 (H) 12/22/2017     CBC:   Lab Results   Component Value Date    WBC 5.51 12/23/2017    HGB 10.8 (L) 12/23/2017    HCT 33.1 (L) 12/23/2017    MCV 90 12/23/2017     12/23/2017     Lipids: No results found for: CHOL, TRIG, HDL, LDLDIRECT  Coagulation:   Lab Results   Component Value Date    INR 1.0 12/23/2017    APTT 38.0 (H) 12/23/2017       Diagnostic Results:  ECG: Reviewed  X-Ray: Reviewed  US: Reviewed  CT: Reviewed  Echo: Reviewed      ASSESSMENT/PLAN:     Patient Active Problem List   Diagnosis    Pneumonia of left lower lobe due to infectious organism    Elevated troponin    Acute CHF    Acute respiratory failure with hypoxia and hypercarbia    Diabetes mellitus type 2, uncontrolled        Plan: Recommend continue IV diuresis, IV heparin x 48 hours, asa, plavix, statin, b blockers. Would opt for conservative therapy , lexiscan stress once diuresed    Past Medical History:   Diagnosis Date    Diabetes mellitus     High cholesterol     Hypertension     Hypothyroid     Irritable bowel disease     Neuropathy      Osteoporosis     Sarcoidosis        Past Surgical History:   Procedure Laterality Date    CHOLECYSTECTOMY      HYSTERECTOMY         Review of patient's allergies indicates:  No Known Allergies    No current facility-administered medications on file prior to encounter.      No current outpatient prescriptions on file prior to encounter.     Family History     None        Social History Main Topics    Smoking status: Never Smoker    Smokeless tobacco: Never Used    Alcohol use No    Drug use: No    Sexual activity: Not on file     Review of Systems   All other systems reviewed and are negative.    Objective:     Vital Signs (Most Recent):  Temp: 97.8 °F (36.6 °C) (12/23/17 1105)  Pulse: 73 (12/23/17 1415)  Resp: (!) 23 (12/23/17 1415)  BP: (!) 152/33 (12/23/17 1105)  SpO2: 97 % (12/23/17 1415) Vital Signs (24h Range):  Temp:  [96.3 °F (35.7 °C)-98 °F (36.7 °C)] 97.8 °F (36.6 °C)  Pulse:  [51-85] 73  Resp:  [15-31] 23  SpO2:  [88 %-98 %] 97 %  BP: (100-193)/() 152/33     Weight: 71 kg (156 lb 8.4 oz)  Body mass index is 26.87 kg/m².    SpO2: 97 %  O2 Device (Oxygen Therapy): (S) venti mask      Intake/Output Summary (Last 24 hours) at 12/23/17 1421  Last data filed at 12/23/17 1100   Gross per 24 hour   Intake            709.7 ml   Output             1960 ml   Net          -1250.3 ml       Lines/Drains/Airways     Drain                 Urethral Catheter 12/22/17 2230 Non-latex 16 Fr. less than 1 day          Peripheral Intravenous Line                 Peripheral IV - Single Lumen 12/22/17 Right Forearm 1 day         Peripheral IV - Single Lumen 12/22/17 2210 Left Forearm less than 1 day                Physical Exam   Constitutional: She is oriented to person, place, and time. She appears well-developed and well-nourished.   HENT:   Head: Normocephalic and atraumatic.   Eyes: Conjunctivae and EOM are normal. Pupils are equal, round, and reactive to light.   Neck: Normal range of motion. Neck supple.    Cardiovascular: Normal rate, regular rhythm, normal heart sounds and intact distal pulses.    Pulmonary/Chest: Effort normal. She has rales.   Abdominal: Soft. Bowel sounds are normal.   Musculoskeletal: Normal range of motion.   Neurological: She is alert and oriented to person, place, and time.   Skin: Skin is warm and dry.   Psychiatric: She has a normal mood and affect.   Nursing note and vitals reviewed.      Significant Labs: All pertinent lab results from the last 24 hours have been reviewed.    Significant Imaging: X-Ray: CXR: X-Ray Chest 1 View (CXR):   Results for orders placed or performed during the hospital encounter of 12/22/17   X-Ray Chest 1 View    Narrative    History: Shortness of breath    Normal heart size. Patchy infiltrates throughout left lung appear unchanged from prior day. There is coarsening of bronchovascular markings bilaterally. Mild patchy infiltrates in the right mid and lower lung.    Impression     No significant change. Patchy bilateral infiltrates, left greater than right.      Electronically signed by: IRIS MERRITT MD  Date:     12/23/17  Time:    08:55      Assessment and Plan:     Recommend continue IV diuresis, IV heparin x 48 hours, asa, plavix, statin, b blockers. Would opt for conservative therapy , lexiscan stress once diuresed    VTE Risk Mitigation         Ordered     heparin 25,000 units in dextrose 5% 250 mL (100 units/mL) infusion; FEMALE  Continuous     Route:  Intravenous        12/23/17 0743     heparin 25,000 units in dextrose 5% 250 mL (100 units/mL) bolus from bag; PRN BOLUS  As needed (PRN)     Route:  Intravenous        12/23/17 0743     heparin 25,000 units in dextrose 5% 250 mL (100 units/mL) bolus from bag; PRN BOLUS  As needed (PRN)     Route:  Intravenous        12/23/17 0743     Medium Risk of VTE  Once      12/22/17 2321     Place sequential compression device  Until discontinued      12/22/17 2321          Thank you for your consult. I will  follow-up with patient. Please contact us if you have any additional questions.    Nathan Nolasco MD  Cardiology   [facility]

## 2017-12-23 NOTE — CONSULTS
[facility] -   Critical Care Medicine  Consult Note    Patient Name: Olivia Reyes  MRN: [id number]  Admission Date: 12/22/2017  Hospital Length of Stay: 1 days  Code Status: DNR  Attending Physician: Polly Garcia MD   Primary Care Provider: No primary care provider on file.   Principal Problem: Acute respiratory failure with hypoxia and hypercarbia      Subjective:     HPI:  Ms Reyes is 89 year old female admitted to MICU for SOB  Hx obtained from Son.  Recent SOB, chest pressure and congestion, seen [facility] after hours, CXR reviewed, given Neb and Doxycycline  Declined at home with respiratory distress, and EMS called for [facility]OB  Low grade fever, chest heaviness and Elevated BP SBP >190   ABGs pH7.315, CO2 52.3, PO2 70. Chest Xray: Patchy infiltrates scattered about the left lung.    The patient was placed on Bipap in ER with improvement in symptoms per patient.   No Hox of COPD   Old Dx of Sarcoidosis: lives in [place]  Is DNR per son     Hospital/ICU Course:  Treated with lasix  On Nasal canula  Heparin GTT started  EKG reviewed      Past Medical History:   Diagnosis Date    Diabetes mellitus     High cholesterol     Hypertension     Hypothyroid     Irritable bowel disease     Neuropathy     Osteoporosis     Sarcoidosis        Past Surgical History:   Procedure Laterality Date    CHOLECYSTECTOMY      HYSTERECTOMY         Review of patient's allergies indicates:  No Known Allergies    Family History     None        Social History Main Topics    Smoking status: Never Smoker    Smokeless tobacco: Never Used    Alcohol use No    Drug use: No    Sexual activity: Not on file         Review of Systems   Constitutional: Positive for fatigue.   Eyes: Negative.    Respiratory: Positive for cough, chest tightness and shortness of breath.    Cardiovascular: Negative.    Gastrointestinal: Negative.    Endocrine: Negative.    Genitourinary: Negative.    Musculoskeletal: Negative.     Neurological: Negative.    Hematological: Negative.    Psychiatric/Behavioral: Negative.      Objective:     Vital Signs (Most Recent):  Temp: 97.8 °F (36.6 °C) (12/23/17 0705)  Pulse: (!) 59 (12/23/17 1000)  Resp: (!) 22 (12/23/17 1000)  BP: (!) 131/100 (12/23/17 1000)  SpO2: (!) 88 % (12/23/17 1000) Vital Signs (24h Range):  Temp:  [96.3 °F (35.7 °C)-98 °F (36.7 °C)] 97.8 °F (36.6 °C)  Pulse:  [51-85] 59  Resp:  [15-31] 22  SpO2:  [88 %-98 %] 88 %  BP: (100-193)/() 131/100     Weight: 71 kg (156 lb 8.4 oz)  Body mass index is 26.87 kg/m².      Intake/Output Summary (Last 24 hours) at 12/23/17 1025  Last data filed at 12/23/17 1009   Gross per 24 hour   Intake              660 ml   Output             1735 ml   Net            -1075 ml       Physical Exam   Constitutional: She is oriented to person, place, and time. She appears well-developed and well-nourished.   HENT:   Head: Normocephalic and atraumatic.   Nose: Nose normal.   Mouth/Throat: Oropharynx is clear and moist. No oropharyngeal exudate.   Eyes: Conjunctivae and EOM are normal. Pupils are equal, round, and reactive to light. Left eye exhibits no discharge. No scleral icterus.   Neck: Normal range of motion. No JVD present. No tracheal deviation present. No thyromegaly present.   Cardiovascular: Normal rate, regular rhythm and normal heart sounds.    No murmur heard.  Pulmonary/Chest: Effort normal. She has wheezes. She has rales.   Abdominal: Soft. Bowel sounds are normal.   Genitourinary:   Genitourinary Comments: hawkins   Musculoskeletal: Normal range of motion. She exhibits no edema or deformity.   Neurological: She is alert and oriented to person, place, and time.   Skin: Skin is warm and dry. Capillary refill takes 2 to 3 seconds.   Psychiatric: She has a normal mood and affect.   Nursing note and vitals reviewed.      Vents:  Oxygen Concentration (%): 40 (12/23/17 1000)    Lines/Drains/Airways     Drain                 Urethral Catheter  12/22/17 2230 Non-latex 16 Fr. less than 1 day          Peripheral Intravenous Line                 Peripheral IV - Single Lumen 12/22/17 Right Forearm 1 day         Peripheral IV - Single Lumen 12/22/17 2210 Left Forearm less than 1 day                Significant Labs:    CBC/Anemia Profile:    Recent Labs  Lab 12/22/17 2140 12/23/17  0539   WBC 8.86 5.51   HGB 14.1 10.8*   HCT 42.1 33.1*    156   MCV 91 90   RDW 14.6* 14.4        Chemistries:    Recent Labs  Lab 12/22/17 2140 12/23/17  0539   * 136   K 3.7 3.3*   CL 98 101   CO2 20* 24   BUN 20 20   CREATININE 0.8 0.8   CALCIUM 8.5* 7.7*   ALBUMIN 3.0*  --    PROT 7.2  --    BILITOT 0.3  --    ALKPHOS 95  --    ALT 14  --    AST 34  --    MG 2.0  --    PHOS 3.8  --        ABGs:   Recent Labs  Lab 12/22/17 2151   PH 7.315*   PCO2 52.3*   HCO3 26.6   POCSATURATED 92*   BE 0     Blood Culture:   Recent Labs  Lab 12/22/17 2210   LABBLOO No Growth to date  No Growth to date     POCT Glucose:   Recent Labs  Lab 12/23/17  0545   POCTGLUCOSE 195*     Respiratory Culture: No results for input(s): GSRESP, RESPIRATORYC in the last 48 hours.  All pertinent labs within the past 24 hours have been reviewed.    Significant Imaging:   I have reviewed and interpreted all pertinent imaging results/findings within the past 24 hours.     CXR        CXR  Normal heart size. Patchy infiltrates throughout left lung appear unchanged from prior day. There is coarsening of bronchovascular markings bilaterally. Mild patchy infiltrates in the right mid and lower lung.   Impression      No significant change. Patchy bilateral infiltrates, left greater than right.     ECHO  1 - Concentric hypertrophy.     2 - No wall motion abnormalities.     3 - Normal left ventricular systolic function (EF 60-65%).     4 - Normal right ventricular systolic function .     5 - The estimated PA systolic pressure is greater than 39 mmHg.     6 - Mild tricuspid regurgitation.    Assessment/Plan:      Neuro    At baseline  PO MEMANTINE  PO SERTRALINE        ENT    Hearing aid        Pulmonary   * Acute respiratory failure with hypoxia and hypercarbia    Keep sats> 92%  Repeat ABG, pCO2 now 43. No need for NIPPV  Bronchodilators  Accapella          Pneumonia of left lower lobe due to infectious organism    Bilateral infiltrates appear chronic without privilege of old films  [place] more likely volume overload  Empiric abx: Moxifloxacin   Sputum and blood cultures pending  Swallow eval        Cardiac/Vascular    Aspirin  Simvastatin        Acute CHF    Possible HFpEF  Responded with diuretics  PRELOAD AND AFTERLOAD reduction  Lisinopril 40 mg          Elevated troponin    Serial cardiac markers  EKG  NTG paste  Follow echo  Oxygen  PRN Morphine        Renal/    Strict I/O  BID lasix        ID    Culture data pending  Moxifloxacin        Hematology    Heparin GTT        Endocrine   Diabetes mellitus type 2, uncontrolled    Levothyroxine 100mcg  Moderate sliding scale Detemir insulin 6 units BID          GI    Pantoprazole EC            Critical Care Daily Checklist:    A: Awake: RASS Goal/Actual Goal: RASS Goal: 0-->alert and calm  Actual:     B: Spontaneous Breathing Trial Performed?     C: SAT & SBT Coordinated?  N/A                      D: Delirium: CAM-ICU Overall CAM-ICU: Negative   E: Early Mobility Performed? No   F: Feeding Goal:    Status:     Current Diet Order   Procedures    Diet NPO      AS: Analgesia/Sedation NONE   T: Thromboembolic Prophylaxis HEPARIN GTT   H: HOB > 300 Yes   U: Stress Ulcer Prophylaxis (if needed) PPI   G: Glucose Control Sliding scale   B: Bowel Function     I: Indwelling Catheter (Lines & Macias) Necessity     D: De-escalation of Antimicrobials/Pharmacotherapies YES    Plan for the day/ETD Improve SpO2    Code Status:  Family/Goals of Care: DNR  HOME     Critical Care Time: 45 minutes  Critical secondary to Patient has a condition that poses threat to life and bodily  function: Severe Respiratory Distress, Pulmonary congestion, Hypoxemia     Critical care was time spent personally by me on the following activities: development of treatment plan with patient or surrogate and bedside caregivers, discussions with consultants, evaluation of patient's response to treatment, examination of patient, ordering and performing treatments and interventions, ordering and review of laboratory studies, ordering and review of radiographic studies, pulse oximetry, re-evaluation of patient's condition. This critical care time did not overlap with that of any other provider or involve time for any procedures.    Thank you for your consult. I will follow-up with patient. Please contact us if you have any additional questions.     Braxton Bonilla MD  Critical Care Medicine  [facility] -

## 2017-12-23 NOTE — H&P
"[facility] Hospital Medicine  History & Physical    Patient Name: Olivia Reyes  MRN: [id number]  Admission Date: 12/22/2017  Attending Physician: Marely Salgado MD  Primary Care Provider: No primary care provider on file.         Patient information was obtained from patient, past medical records and ER records.     Subjective:     Principal Problem:Acute respiratory failure with hypoxia and hypercarbia    Chief Complaint:   Chief Complaint   Patient presents with    Shortness of Breath     sob worsening x1wk; seen at clinic today, chest xray showed fluid in lungs, given neb and sent home with antibiotics; placed on cpap enroute        HPI: Olivia Reyes is a 89 y.o. female patient who presented to the Emergency Department for SOB. Onset gradually 1 week ago. Symptoms constant and worsening. Patient reports over past week that she has been noticing increaed SOB with excursion. Prior treatment neb, evaluation at [facility] after hours, and 1 doxycycline. The  states that the pt "deteriorated 3 hours later", which prompted the ED visit.  No mitigating or exacerbating factors reported. Associated sxs include chest pain and "low grade" fever (99.5). En route, pt was placed on CPAP by EMS.  denies any diaphoresis, dizziness, focal weakness/ numbness, cough, palpitations, leg swelling, n/v/d, and all other sxs at this time. No further complaints or concerns at this time. The patient was evaluated in the Er, CBC neg, CMP notable for hypergycemia and CO2 20. Troponin and BNP elevated. UA neg. ABGs pH7.315, CO2 52.3, PO2 70. Chest Xray: Patchy infiltrates scattered about the left lung.  Question of a mild infiltrate right lung base.  Heart size within normal limits.  Vascular calcification of aorta.  No significant bony findings. The patient was placed on Bipap in ER with improvement in symptoms per patient. The patient admitted to ICU with Bipap for Acute Resp Failure with hypoxemia and hypercap. "     Past Medical History:   Diagnosis Date    Diabetes mellitus     High cholesterol     Hypertension     Hypothyroid     Irritable bowel disease     Neuropathy     Osteoporosis     Sarcoidosis        Past Surgical History:   Procedure Laterality Date    CHOLECYSTECTOMY      HYSTERECTOMY         Review of patient's allergies indicates:  No Known Allergies    No current facility-administered medications on file prior to encounter.      No current outpatient prescriptions on file prior to encounter.     Family History     Reviewed and not Pertinent         Social History Main Topics    Smoking status: Never Smoker    Smokeless tobacco: Never Used    Alcohol use No    Drug use: No    Sexual activity: Not on file     Review of Systems   Constitutional: Negative for chills, diaphoresis, fatigue and fever.   HENT: Negative for congestion, sore throat and voice change.    Eyes: Negative for photophobia and visual disturbance.   Respiratory: Positive for shortness of breath. Negative for cough, wheezing and stridor.    Cardiovascular: Negative for chest pain and leg swelling.   Gastrointestinal: Negative for abdominal distention, abdominal pain, constipation, diarrhea, nausea and vomiting.   Endocrine: Negative for polydipsia, polyphagia and polyuria.   Genitourinary: Negative for difficulty urinating, dysuria, flank pain, pelvic pain, urgency and vaginal discharge.   Musculoskeletal: Negative for back pain, joint swelling, neck pain and neck stiffness.   Skin: Negative for color change and rash.   Allergic/Immunologic: Negative for immunocompromised state.   Neurological: Negative for dizziness, syncope, weakness, numbness and headaches.   Hematological: Does not bruise/bleed easily.   Psychiatric/Behavioral: Negative for agitation, behavioral problems and confusion.     Objective:     Vital Signs (Most Recent):  Temp: 96.3 °F (35.7 °C) (12/22/17 2134)  Pulse: 74 (12/22/17 2232)  Resp: 20 (12/22/17  2232)  BP: (!) 155/69 (12/22/17 2232)  SpO2: 96 % (12/22/17 2232) Vital Signs (24h Range):  Temp:  [96.3 °F (35.7 °C)] 96.3 °F (35.7 °C)  Pulse:  [74-85] 74  Resp:  [20-26] 20  SpO2:  [96 %-97 %] 96 %  BP: (155-193)/(69-79) 155/69     Weight: 72.3 kg (159 lb 8 oz)  Body mass index is 27.38 kg/m².    Physical Exam   Constitutional: She is oriented to person, place, and time. She appears well-developed. She does not have a sickly appearance. She appears ill. No distress.   elderly     HENT:   Head: Normocephalic and atraumatic.   Nose: Nose normal.   Eyes: Conjunctivae and EOM are normal. Pupils are equal, round, and reactive to light. No scleral icterus.   Neck: Normal range of motion. Neck supple. No tracheal deviation present.   Cardiovascular: Normal rate, regular rhythm, normal heart sounds and intact distal pulses.    No murmur heard.  Pulmonary/Chest: Effort normal. No stridor. No respiratory distress. She has no wheezes. She has rales.   bipap  Sob increased with short conversation  Course throughout    Abdominal: Soft. Bowel sounds are normal. She exhibits no distension. There is no tenderness. There is no guarding.   Genitourinary:   Genitourinary Comments: deferred   Musculoskeletal: Normal range of motion. She exhibits no edema or deformity.   Neurological: She is alert and oriented to person, place, and time. No cranial nerve deficit.   Skin: Skin is warm and dry. Capillary refill takes less than 2 seconds. No rash noted. She is not diaphoretic.   Psychiatric: She has a normal mood and affect. Her behavior is normal. Judgment and thought content normal.   Nursing note and vitals reviewed.        CRANIAL NERVES     CN III, IV, VI   Pupils are equal, round, and reactive to light.  Extraocular motions are normal.        Significant Labs: All pertinent labs within the past 24 hours have been reviewed.   Results for orders placed or performed during the hospital encounter of 12/22/17   APTT   Result Value Ref  Range    aPTT 29.9 21.0 - 32.0 sec   Brain natriuretic peptide   Result Value Ref Range     (H) 0 - 99 pg/mL   CBC auto differential   Result Value Ref Range    WBC 8.86 3.90 - 12.70 K/uL    RBC 4.62 4.00 - 5.40 M/uL    Hemoglobin 14.1 12.0 - 16.0 g/dL    Hematocrit 42.1 37.0 - 48.5 %    MCV 91 82 - 98 fL    MCH 30.5 27.0 - 31.0 pg    MCHC 33.5 32.0 - 36.0 g/dL    RDW 14.6 (H) 11.5 - 14.5 %    Platelets 177 150 - 350 K/uL    MPV 10.9 9.2 - 12.9 fL    Gran # 7.3 1.8 - 7.7 K/uL    Lymph # 1.1 1.0 - 4.8 K/uL    Mono # 0.5 0.3 - 1.0 K/uL    Eos # 0.0 0.0 - 0.5 K/uL    Baso # 0.04 0.00 - 0.20 K/uL    Gran% 81.8 (H) 38.0 - 73.0 %    Lymph% 12.1 (L) 18.0 - 48.0 %    Mono% 5.5 4.0 - 15.0 %    Eosinophil% 0.1 0.0 - 8.0 %    Basophil% 0.5 0.0 - 1.9 %    Differential Method Automated    Comprehensive metabolic panel   Result Value Ref Range    Sodium 134 (L) 136 - 145 mmol/L    Potassium 3.7 3.5 - 5.1 mmol/L    Chloride 98 95 - 110 mmol/L    CO2 20 (L) 23 - 29 mmol/L    Glucose 272 (H) 70 - 110 mg/dL    BUN, Bld 20 10 - 30 mg/dL    Creatinine 0.8 0.5 - 1.4 mg/dL    Calcium 8.5 (L) 8.7 - 10.5 mg/dL    Total Protein 7.2 6.0 - 8.4 g/dL    Albumin 3.0 (L) 3.5 - 5.2 g/dL    Total Bilirubin 0.3 0.1 - 1.0 mg/dL    Alkaline Phosphatase 95 55 - 135 U/L    AST 34 10 - 40 U/L    ALT 14 10 - 44 U/L    Anion Gap 16 8 - 16 mmol/L    eGFR if African American >60 >60 mL/min/1.73 m^2    eGFR if non African American >60 >60 mL/min/1.73 m^2   Lactic acid, plasma #1   Result Value Ref Range    Lactate (Lactic Acid) 2.0 0.5 - 2.2 mmol/L   Lipase   Result Value Ref Range    Lipase 6 4 - 60 U/L   Magnesium   Result Value Ref Range    Magnesium 2.0 1.6 - 2.6 mg/dL   Phosphorus   Result Value Ref Range    Phosphorus 3.8 2.7 - 4.5 mg/dL   Protime-INR   Result Value Ref Range    Prothrombin Time 9.8 9.0 - 12.5 sec    INR 0.9 0.8 - 1.2   Troponin I   Result Value Ref Range    Troponin I 0.101 (H) 0.000 - 0.026 ng/mL   TSH   Result Value Ref Range     TSH 1.381 0.400 - 4.000 uIU/mL   Urinalysis   Result Value Ref Range    Specimen UA Urine, Catheterized     Color, UA Yellow Yellow, Straw, Marta    Appearance, UA Hazy (A) Clear    pH, UA 6.0 5.0 - 8.0    Specific Gravity, UA 1.025 1.005 - 1.030    Protein, UA 3+ (A) Negative    Glucose, UA 2+ (A) Negative    Ketones, UA Negative Negative    Bilirubin (UA) Negative Negative    Occult Blood UA 1+ (A) Negative    Nitrite, UA Negative Negative    Urobilinogen, UA Negative <2.0 EU/dL    Leukocytes, UA Negative Negative   Urinalysis Microscopic   Result Value Ref Range    RBC, UA 2 0 - 4 /hpf    WBC, UA 1 0 - 5 /hpf    Bacteria, UA Occasional None-Occ /hpf    Hyaline Casts, UA 2 (A) 0-1/lpf /lpf    Granular Casts, UA 1 (A) None /lpf    Amorphous, UA Few None-Moderate    Microscopic Comment SEE COMMENT    ISTAT PROCEDURE   Result Value Ref Range    POC PH 7.315 (L) 7.35 - 7.45    POC PCO2 52.3 (H) 35 - 45 mmHg    POC PO2 70 (L) 80 - 100 mmHg    POC HCO3 26.6 24 - 28 mmol/L    POC BE 0 -2 to 2 mmol/L    POC SATURATED O2 92 (L) 95 - 100 %    Rate 16     Sample ARTERIAL     Site RR     Allens Test Pass     DelSys CPAP/BiPAP     Mode BiPAP     FiO2 50     Sp02 95     IP 10     EP 6          Significant Imaging: I have reviewed all pertinent imaging results/findings within the past 24 hours.   Imaging Results          X-Ray Chest AP Portable (Final result)  Result time 12/22/17 22:21:09    Final result by Nick Francisco MD (12/22/17 22:21:09)                 Impression:         Patchy infiltrates throughout the left lung.      Electronically signed by: NICK FRANCISCO MD  Date:     12/22/17  Time:    22:21              Narrative:    Exam: XR CHEST AP PORTABLE,    Date:  12/22/17 22:12:48    History: Sepsis.    Comparison:  No prior  studies available for comparison.    Findings:     Patchy infiltrates scattered about the left lung.  Question of a mild infiltrate right lung base.  Heart size within normal limits.   Vascular calcification of aorta.  No significant bony findings.                            I have personally reviewed the patients labs, imaging, ekg and discussed the patient case in detail with the Er provider    Assessment/Plan:     * Acute respiratory failure with hypoxia and hypercarbia    Acute CHF + PNE  Treat  Bipap   Monitor   Pulmonary consult .           Pneumonia of left lower lobe due to infectious organism    -Admit to ICU with Bipap  -IV antx vanc and zosyn started in ER, to get x 1 dose each. Patient reports only starting oral antx treatment pta with one dose of doxy.  Change to Aveolox.    -WBCs in ER neg. , Lactic 2.0  -Resp therapy consult with sputum culture   -Blood Cultures pending  -Breathing Treatments q6h   -CBC, BMP in Am  -Monitor   -pulmonary consult.             Acute CHF    diruesis  Echo  Trend troponin  Monitor   Continue ACE  Consider consult to cardiology           Diabetes mellitus type 2, uncontrolled      Check a1c  ssi  Monitor  Encourage diet   May need additonal teaching        Elevated troponin    Likely demand, paitent with no cp  Trend    Consider ASA daily. Pt over 90 increase risk gi bleed          VTE Risk Mitigation         Ordered     enoxaparin injection 40 mg  Daily     Route:  Subcutaneous        12/22/17 2321     Medium Risk of VTE  Once      12/22/17 2321     Medium Risk of VTE  Once      12/22/17 2321     Place sequential compression device  Until discontinued      12/22/17 2321         total critical care time spent on case: 40 mins    Hiram Tinajero NP  Department of Hospital Medicine   [facility] -

## 2017-12-23 NOTE — SUBJECTIVE & OBJECTIVE
Past Medical History:   Diagnosis Date    Diabetes mellitus     High cholesterol     Hypertension     Hypothyroid     Irritable bowel disease     Neuropathy     Osteoporosis     Sarcoidosis        Past Surgical History:   Procedure Laterality Date    CHOLECYSTECTOMY      HYSTERECTOMY         Review of patient's allergies indicates:  No Known Allergies    No current facility-administered medications on file prior to encounter.      No current outpatient prescriptions on file prior to encounter.     Family History     None        Social History Main Topics    Smoking status: Never Smoker    Smokeless tobacco: Never Used    Alcohol use No    Drug use: No    Sexual activity: Not on file     Review of Systems   Constitutional: Negative for chills, diaphoresis, fatigue and fever.   HENT: Negative for congestion, sore throat and voice change.    Eyes: Negative for photophobia and visual disturbance.   Respiratory: Positive for shortness of breath. Negative for cough, wheezing and stridor.    Cardiovascular: Negative for chest pain and leg swelling.   Gastrointestinal: Negative for abdominal distention, abdominal pain, constipation, diarrhea, nausea and vomiting.   Endocrine: Negative for polydipsia, polyphagia and polyuria.   Genitourinary: Negative for difficulty urinating, dysuria, flank pain, pelvic pain, urgency and vaginal discharge.   Musculoskeletal: Negative for back pain, joint swelling, neck pain and neck stiffness.   Skin: Negative for color change and rash.   Allergic/Immunologic: Negative for immunocompromised state.   Neurological: Negative for dizziness, syncope, weakness, numbness and headaches.   Hematological: Does not bruise/bleed easily.   Psychiatric/Behavioral: Negative for agitation, behavioral problems and confusion.     Objective:     Vital Signs (Most Recent):  Temp: 96.3 °F (35.7 °C) (12/22/17 2134)  Pulse: 74 (12/22/17 2232)  Resp: 20 (12/22/17 2232)  BP: (!) 155/69 (12/22/17  2232)  SpO2: 96 % (12/22/17 2232) Vital Signs (24h Range):  Temp:  [96.3 °F (35.7 °C)] 96.3 °F (35.7 °C)  Pulse:  [74-85] 74  Resp:  [20-26] 20  SpO2:  [96 %-97 %] 96 %  BP: (155-193)/(69-79) 155/69     Weight: 72.3 kg (159 lb 8 oz)  Body mass index is 27.38 kg/m².    Physical Exam   Constitutional: She is oriented to person, place, and time. She appears well-developed. She does not have a sickly appearance. She appears ill. No distress.   elderly     HENT:   Head: Normocephalic and atraumatic.   Nose: Nose normal.   Eyes: Conjunctivae and EOM are normal. Pupils are equal, round, and reactive to light. No scleral icterus.   Neck: Normal range of motion. Neck supple. No tracheal deviation present.   Cardiovascular: Normal rate, regular rhythm, normal heart sounds and intact distal pulses.    No murmur heard.  Pulmonary/Chest: Effort normal. No stridor. No respiratory distress. She has no wheezes. She has rales.   bipap  Sob increased with short conversation   Course throughout   Abdominal: Soft. Bowel sounds are normal. She exhibits no distension. There is no tenderness. There is no guarding.   Genitourinary:   Genitourinary Comments: deferred   Musculoskeletal: Normal range of motion. She exhibits no edema or deformity.   Neurological: She is alert and oriented to person, place, and time. No cranial nerve deficit.   Skin: Skin is warm and dry. Capillary refill takes less than 2 seconds. No rash noted. She is not diaphoretic.   Psychiatric: She has a normal mood and affect. Her behavior is normal. Judgment and thought content normal.   Nursing note and vitals reviewed.        CRANIAL NERVES     CN III, IV, VI   Pupils are equal, round, and reactive to light.  Extraocular motions are normal.        Significant Labs: All pertinent labs within the past 24 hours have been reviewed.   Results for orders placed or performed during the hospital encounter of 12/22/17   APTT   Result Value Ref Range    aPTT 29.9 21.0 - 32.0  sec   Brain natriuretic peptide   Result Value Ref Range     (H) 0 - 99 pg/mL   CBC auto differential   Result Value Ref Range    WBC 8.86 3.90 - 12.70 K/uL    RBC 4.62 4.00 - 5.40 M/uL    Hemoglobin 14.1 12.0 - 16.0 g/dL    Hematocrit 42.1 37.0 - 48.5 %    MCV 91 82 - 98 fL    MCH 30.5 27.0 - 31.0 pg    MCHC 33.5 32.0 - 36.0 g/dL    RDW 14.6 (H) 11.5 - 14.5 %    Platelets 177 150 - 350 K/uL    MPV 10.9 9.2 - 12.9 fL    Gran # 7.3 1.8 - 7.7 K/uL    Lymph # 1.1 1.0 - 4.8 K/uL    Mono # 0.5 0.3 - 1.0 K/uL    Eos # 0.0 0.0 - 0.5 K/uL    Baso # 0.04 0.00 - 0.20 K/uL    Gran% 81.8 (H) 38.0 - 73.0 %    Lymph% 12.1 (L) 18.0 - 48.0 %    Mono% 5.5 4.0 - 15.0 %    Eosinophil% 0.1 0.0 - 8.0 %    Basophil% 0.5 0.0 - 1.9 %    Differential Method Automated    Comprehensive metabolic panel   Result Value Ref Range    Sodium 134 (L) 136 - 145 mmol/L    Potassium 3.7 3.5 - 5.1 mmol/L    Chloride 98 95 - 110 mmol/L    CO2 20 (L) 23 - 29 mmol/L    Glucose 272 (H) 70 - 110 mg/dL    BUN, Bld 20 10 - 30 mg/dL    Creatinine 0.8 0.5 - 1.4 mg/dL    Calcium 8.5 (L) 8.7 - 10.5 mg/dL    Total Protein 7.2 6.0 - 8.4 g/dL    Albumin 3.0 (L) 3.5 - 5.2 g/dL    Total Bilirubin 0.3 0.1 - 1.0 mg/dL    Alkaline Phosphatase 95 55 - 135 U/L    AST 34 10 - 40 U/L    ALT 14 10 - 44 U/L    Anion Gap 16 8 - 16 mmol/L    eGFR if African American >60 >60 mL/min/1.73 m^2    eGFR if non African American >60 >60 mL/min/1.73 m^2   Lactic acid, plasma #1   Result Value Ref Range    Lactate (Lactic Acid) 2.0 0.5 - 2.2 mmol/L   Lipase   Result Value Ref Range    Lipase 6 4 - 60 U/L   Magnesium   Result Value Ref Range    Magnesium 2.0 1.6 - 2.6 mg/dL   Phosphorus   Result Value Ref Range    Phosphorus 3.8 2.7 - 4.5 mg/dL   Protime-INR   Result Value Ref Range    Prothrombin Time 9.8 9.0 - 12.5 sec    INR 0.9 0.8 - 1.2   Troponin I   Result Value Ref Range    Troponin I 0.101 (H) 0.000 - 0.026 ng/mL   TSH   Result Value Ref Range    TSH 1.381 0.400 - 4.000  uIU/mL   Urinalysis   Result Value Ref Range    Specimen UA Urine, Catheterized     Color, UA Yellow Yellow, Straw, Marta    Appearance, UA Hazy (A) Clear    pH, UA 6.0 5.0 - 8.0    Specific Gravity, UA 1.025 1.005 - 1.030    Protein, UA 3+ (A) Negative    Glucose, UA 2+ (A) Negative    Ketones, UA Negative Negative    Bilirubin (UA) Negative Negative    Occult Blood UA 1+ (A) Negative    Nitrite, UA Negative Negative    Urobilinogen, UA Negative <2.0 EU/dL    Leukocytes, UA Negative Negative   Urinalysis Microscopic   Result Value Ref Range    RBC, UA 2 0 - 4 /hpf    WBC, UA 1 0 - 5 /hpf    Bacteria, UA Occasional None-Occ /hpf    Hyaline Casts, UA 2 (A) 0-1/lpf /lpf    Granular Casts, UA 1 (A) None /lpf    Amorphous, UA Few None-Moderate    Microscopic Comment SEE COMMENT    ISTAT PROCEDURE   Result Value Ref Range    POC PH 7.315 (L) 7.35 - 7.45    POC PCO2 52.3 (H) 35 - 45 mmHg    POC PO2 70 (L) 80 - 100 mmHg    POC HCO3 26.6 24 - 28 mmol/L    POC BE 0 -2 to 2 mmol/L    POC SATURATED O2 92 (L) 95 - 100 %    Rate 16     Sample ARTERIAL     Site RR     Allens Test Pass     DelSys CPAP/BiPAP     Mode BiPAP     FiO2 50     Sp02 95     IP 10     EP 6          Significant Imaging: I have reviewed all pertinent imaging results/findings within the past 24 hours.   Imaging Results          X-Ray Chest AP Portable (Final result)  Result time 12/22/17 22:21:09    Final result by Nick Francisco MD (12/22/17 22:21:09)                 Impression:         Patchy infiltrates throughout the left lung.      Electronically signed by: NICK FRANCISCO MD  Date:     12/22/17  Time:    22:21              Narrative:    Exam: XR CHEST AP PORTABLE,    Date:  12/22/17 22:12:48    History: Sepsis.    Comparison:  No prior  studies available for comparison.    Findings:     Patchy infiltrates scattered about the left lung.  Question of a mild infiltrate right lung base.  Heart size within normal limits.  Vascular calcification of  aorta.  No significant bony findings.

## 2017-12-23 NOTE — PLAN OF CARE
Assessment completed. Met with the patient and son. Patient is here for the holidays visiting. He stated she was SOB and not feeling well. They did go to an urgent care center and was seen and sent home. They were told no Pneumonia but URI. She fel;t worse about 3 hr later and the patient came to the ED. Patient admitted to ICU. Patient lives with her dtr in [facility]. She plans to return home and her flight out id [phone]. The son and patient stated she has had HH in the past and they will be glad to have f/u with HH agency in [place]. CM to f/u with safe transition     12/23/17 0979   Discharge Assessment   Assessment Type Discharge Planning Assessment   Confirmed/corrected address and phone number on facesheet? Yes   Assessment information obtained from? Patient;Caregiver;Medical Record   Expected Length of Stay (days) (TBD)   Communicated expected length of stay with patient/caregiver no   Prior to hospitilization cognitive status: Alert/Oriented   Prior to hospitalization functional status: Assistive Equipment   Current cognitive status: Alert/Oriented   Current Functional Status: Needs Assistance   Lives With alone   Able to Return to Prior Arrangements yes   Who are your caregiver(s) and their phone number(s)? Dameno (son)   Patient's perception of discharge disposition home or selfcare   Readmission Within The Last 30 Days no previous admission in last 30 days   Patient currently being followed by outpatient case management? No   Patient currently receives any other outside agency services? No   Equipment Currently Used at Home walker, rolling   Do you have any problems affording any of your prescribed medications? No   Is the patient taking medications as prescribed? yes   Does the patient have transportation home? Yes   Transportation Available family or friend will provide   Does the patient receive services at the [facility]? No   Discharge Plan A Home with family;Home Health   Discharge Plan B Home  with family   Patient/Family In Agreement With Plan yes

## 2017-12-23 NOTE — ED PROVIDER NOTES
"SCRIBE #1 NOTE: I, Michel Pringle, am scribing for, and in the presence of, Victorino Del Valle Jr., MD. I have scribed the HPI, ROS, and PEx.     SCRIBE #2 NOTE: I, Huong Scanlon, am scribing for, and in the presence of,  Victorino Del Valle MD. I have scribed the remaining portions of the note not scribed by Scribe #1.     History      Chief Complaint   Patient presents with    Shortness of Breath     sob worsening x1wk; seen at clinic today, chest xray showed fluid in lungs, given neb and sent home with antibiotics; placed on cpap enroute       Review of patient's allergies indicates:  No Known Allergies     HPI   HPI    12/22/2017, 9:48 PM   History obtained from the       History of Present Illness: Olivia Reyes is a 89 y.o. female patient who presents to the Emergency Department for SOB which onset gradually 1 week ago, worsening this PM.  states that pt was evaluated at [facility] for SOB this PM. Per the , pt had a chest x-ray at [facility] that showed "fluid in the lungs." Pt was given a nebulizer tx and Rx doxycycline. The  states that the pt "deteriorated 3 hours later", which prompted the ED visit. Symptoms are constant and moderate in severity.  No mitigating or exacerbating factors reported. Associated sxs include chest pain and "low grade" fever (99.5). En route, pt was placed on CPAP by EMS.  denies any diaphoresis, dizziness, focal weakness/ numbness, cough, palpitations, leg swelling, n/v/d, and all other sxs at this time. No further complaints or concerns at this time.       Arrival mode: EMS    PCP: Not given      Past Medical History:  Past Medical History:   Diagnosis Date    Diabetes mellitus     High cholesterol     Hypertension     Hypothyroid     Irritable bowel disease     Neuropathy     Osteoporosis     Sarcoidosis        Past Surgical History:  Past Surgical History:   Procedure Laterality Date    CHOLECYSTECTOMY      HYSTERECTOMY           Family History:  History " reviewed. No pertinent family history.    Social History:  Social History     Social History Main Topics    Smoking status: Never Smoker    Smokeless tobacco: Never Used    Alcohol use No    Drug use: No    Sexual activity: Unknown       ROS   Review of Systems   Constitutional: Positive for fever (tmax 99.5). Negative for chills and diaphoresis.   Respiratory: Positive for shortness of breath. Negative for cough.    Cardiovascular: Positive for chest pain. Negative for palpitations and leg swelling.   Gastrointestinal: Negative for abdominal pain, diarrhea, nausea and vomiting.   Genitourinary: Negative for difficulty urinating, dysuria, frequency, hematuria and urgency.   Musculoskeletal: Negative for back pain.   Skin: Negative for rash.   Neurological: Negative for dizziness, syncope, weakness, light-headedness, numbness and headaches.   All other systems reviewed and are negative.      Physical Exam      Initial Vitals [12/22/17 2134]   BP Pulse Resp Temp SpO2   (!) 193/79 85 (!) 26 96.3 °F (35.7 °C) 97 %      MAP       117          Physical Exam  Nursing Notes and Vital Signs Reviewed.  Constitutional: Patient is in no apparent distress. Well-developed and well-nourished.  Head: Atraumatic. Normocephalic.  Eyes: PERRL. EOM intact. Conjunctivae are not pale. No scleral icterus.  ENT: Mucous membranes are moist. Oropharynx is clear and symmetric.    Neck: Supple. Full ROM. No lymphadenopathy.  Cardiovascular: Regular rate. Regular rhythm. No murmurs, rubs, or gallops. Distal pulses are 2+ and symmetric.   Pulmonary/Chest: No respiratory distress. Bilateral crackles. No wheezing appreciated.   Abdominal: Soft and non-distended.  There is no tenderness.  No rebound, guarding, or rigidity. Good bowel sounds.  Musculoskeletal: Moves all extremities. No obvious deformities. No edema. No calf tenderness.  Skin: Warm and dry.  Neurological:  Alert, awake, oriented, and appropriate.  Normal speech.  No acute focal  neurological deficits are appreciated. GCS 15.   Psychiatric: Normal affect. Good eye contact. Appropriate in content.    ED Course    Critical Care  Date/Time: 12/22/2017 11:01 PM  Performed by: ALVIN STERN JR  Authorized by: ALVIN STERN JR   Direct patient critical care time: 10 minutes  Additional history critical care time: 10 minutes  Ordering / reviewing critical care time: 10 minutes  Documentation critical care time: 10 minutes  Consulting other physicians critical care time: 10 minutes  Consult with family critical care time: 10 minutes  Total critical care time (exclusive of procedural time) : 60 minutes  Critical care time was exclusive of separately billable procedures and treating other patients and teaching time.  Critical care was necessary to treat or prevent imminent or life-threatening deterioration of the following conditions: respiratory failure.  Critical care was time spent personally by me on the following activities: blood draw for specimens, development of treatment plan with patient or surrogate, discussions with consultants, interpretation of cardiac output measurements, evaluation of patient's response to treatment, examination of patient, obtaining history from patient or surrogate, ordering and performing treatments and interventions, ordering and review of laboratory studies, ordering and review of radiographic studies, pulse oximetry, re-evaluation of patient's condition and review of old charts.  Subsequent provider of critical care: I assumed direction of critical care for this patient from another provider of my specialty.        ED Vital Signs:  Vitals:    12/27/17 1100 12/27/17 1136 12/27/17 1200 12/27/17 1300   BP:  (!) 162/71     Pulse: (!) 50 (!) 52  (!) 50   Resp:  18     Temp:  98 °F (36.7 °C)     TempSrc:  Axillary     SpO2:  (!) 90% (!) 93%    Weight:       Height:        12/27/17 1430 12/27/17 1523 12/27/17 1639 12/27/17 1643   BP:  126/79 (!) 156/88    Pulse:  (!)  59 62    Resp:   18 20   Temp:   97.1 °F (36.2 °C)    TempSrc:   Axillary    SpO2: (S) (!) 89% (!) 92% (!) 83% (!) 92%   Weight:       Height:        12/27/17 1722 12/27/17 2013 12/27/17 2110 12/27/17 2155   BP:  (!) 157/68  (!) 157/67   Pulse:  (!) 52 (!) 57 (!) 57   Resp:  18 18    Temp:  97.9 °F (36.6 °C)     TempSrc:  Oral     SpO2: (S) 97% 95% 95%    Weight:       Height:        12/27/17 2319 12/28/17 0335 12/28/17 0426   BP: (!) 170/74 (!) 170/69    Pulse: (!) 59 (!) 52    Resp: 16 18    Temp: 97.7 °F (36.5 °C) 97.8 °F (36.6 °C)    TempSrc: Oral Oral    SpO2: 98% (!) 92%    Weight:  72 kg (158 lb 11.7 oz) 72.2 kg (159 lb 2.8 oz)   Height:          Abnormal Lab Results:  Labs Reviewed   B-TYPE NATRIURETIC PEPTIDE - Abnormal; Notable for the following:        Result Value     (*)     All other components within normal limits   CBC W/ AUTO DIFFERENTIAL - Abnormal; Notable for the following:     RDW 14.6 (*)     Gran% 81.8 (*)     Lymph% 12.1 (*)     All other components within normal limits   COMPREHENSIVE METABOLIC PANEL - Abnormal; Notable for the following:     Sodium 134 (*)     CO2 20 (*)     Glucose 272 (*)     Calcium 8.5 (*)     Albumin 3.0 (*)     All other components within normal limits   TROPONIN I - Abnormal; Notable for the following:     Troponin I 0.101 (*)     All other components within normal limits   URINALYSIS - Abnormal; Notable for the following:     Appearance, UA Hazy (*)     Protein, UA 3+ (*)     Glucose, UA 2+ (*)     Occult Blood UA 1+ (*)     All other components within normal limits   URINALYSIS MICROSCOPIC - Abnormal; Notable for the following:     Hyaline Casts, UA 2 (*)     Granular Casts, UA 1 (*)     All other components within normal limits   ISTAT PROCEDURE - Abnormal; Notable for the following:     POC PH 7.315 (*)     POC PCO2 52.3 (*)     POC PO2 70 (*)     POC SATURATED O2 92 (*)     All other components within normal limits   APTT   LACTIC ACID, PLASMA   LIPASE    MAGNESIUM   PHOSPHORUS   PROTIME-INR   TSH   INFLUENZA A AND B ANTIGEN        All Lab Results:  Results for orders placed or performed during the hospital encounter of 12/22/17   Blood culture x two cultures. Draw prior to antibiotics.   Result Value Ref Range    Blood Culture, Routine No Growth to date     Blood Culture, Routine No Growth to date     Blood Culture, Routine No Growth to date     Blood Culture, Routine No Growth to date     Blood Culture, Routine No Growth to date    Blood culture x two cultures. Draw prior to antibiotics.   Result Value Ref Range    Blood Culture, Routine No Growth to date     Blood Culture, Routine No Growth to date     Blood Culture, Routine No Growth to date     Blood Culture, Routine No Growth to date     Blood Culture, Routine No Growth to date    Urine culture   Result Value Ref Range    Urine Culture, Routine No growth    Culture, Respiratory with Gram Stain   Result Value Ref Range    Respiratory Culture Normal respiratory zoey     Gram Stain (Respiratory) <10 epithelial cells per low power field.     Gram Stain (Respiratory) Rare WBC's     Gram Stain (Respiratory) No organisms seen    Clostridium difficile EIA   Result Value Ref Range    C. diff Antigen Negative Negative    C difficile Toxins A+B, EIA Negative Negative   APTT   Result Value Ref Range    aPTT 29.9 21.0 - 32.0 sec   Brain natriuretic peptide   Result Value Ref Range     (H) 0 - 99 pg/mL   CBC auto differential   Result Value Ref Range    WBC 8.86 3.90 - 12.70 K/uL    RBC 4.62 4.00 - 5.40 M/uL    Hemoglobin 14.1 12.0 - 16.0 g/dL    Hematocrit 42.1 37.0 - 48.5 %    MCV 91 82 - 98 fL    MCH 30.5 27.0 - 31.0 pg    MCHC 33.5 32.0 - 36.0 g/dL    RDW 14.6 (H) 11.5 - 14.5 %    Platelets 177 150 - 350 K/uL    MPV 10.9 9.2 - 12.9 fL    Gran # 7.3 1.8 - 7.7 K/uL    Lymph # 1.1 1.0 - 4.8 K/uL    Mono # 0.5 0.3 - 1.0 K/uL    Eos # 0.0 0.0 - 0.5 K/uL    Baso # 0.04 0.00 - 0.20 K/uL    Gran% 81.8 (H) 38.0 - 73.0 %     Lymph% 12.1 (L) 18.0 - 48.0 %    Mono% 5.5 4.0 - 15.0 %    Eosinophil% 0.1 0.0 - 8.0 %    Basophil% 0.5 0.0 - 1.9 %    Differential Method Automated    Comprehensive metabolic panel   Result Value Ref Range    Sodium 134 (L) 136 - 145 mmol/L    Potassium 3.7 3.5 - 5.1 mmol/L    Chloride 98 95 - 110 mmol/L    CO2 20 (L) 23 - 29 mmol/L    Glucose 272 (H) 70 - 110 mg/dL    BUN, Bld 20 10 - 30 mg/dL    Creatinine 0.8 0.5 - 1.4 mg/dL    Calcium 8.5 (L) 8.7 - 10.5 mg/dL    Total Protein 7.2 6.0 - 8.4 g/dL    Albumin 3.0 (L) 3.5 - 5.2 g/dL    Total Bilirubin 0.3 0.1 - 1.0 mg/dL    Alkaline Phosphatase 95 55 - 135 U/L    AST 34 10 - 40 U/L    ALT 14 10 - 44 U/L    Anion Gap 16 8 - 16 mmol/L    eGFR if African American >60 >60 mL/min/1.73 m^2    eGFR if non African American >60 >60 mL/min/1.73 m^2   Cortisol   Result Value Ref Range    Cortisol 34.6 ug/dL   Lactic acid, plasma #1   Result Value Ref Range    Lactate (Lactic Acid) 2.0 0.5 - 2.2 mmol/L   Lipase   Result Value Ref Range    Lipase 6 4 - 60 U/L   Magnesium   Result Value Ref Range    Magnesium 2.0 1.6 - 2.6 mg/dL   Phosphorus   Result Value Ref Range    Phosphorus 3.8 2.7 - 4.5 mg/dL   Protime-INR   Result Value Ref Range    Prothrombin Time 9.8 9.0 - 12.5 sec    INR 0.9 0.8 - 1.2   Troponin I   Result Value Ref Range    Troponin I 0.101 (H) 0.000 - 0.026 ng/mL   TSH   Result Value Ref Range    TSH 1.381 0.400 - 4.000 uIU/mL   Urinalysis   Result Value Ref Range    Specimen UA Urine, Catheterized     Color, UA Yellow Yellow, Straw, Marta    Appearance, UA Hazy (A) Clear    pH, UA 6.0 5.0 - 8.0    Specific Gravity, UA 1.025 1.005 - 1.030    Protein, UA 3+ (A) Negative    Glucose, UA 2+ (A) Negative    Ketones, UA Negative Negative    Bilirubin (UA) Negative Negative    Occult Blood UA 1+ (A) Negative    Nitrite, UA Negative Negative    Urobilinogen, UA Negative <2.0 EU/dL    Leukocytes, UA Negative Negative   Influenza antigen Nasopharyngeal Swab   Result Value  Ref Range    Influenza A Ag, EIA Negative Negative    Influenza B Ag, EIA Negative Negative    Flu A & B Source Nasopharyngeal Swab    Urinalysis Microscopic   Result Value Ref Range    RBC, UA 2 0 - 4 /hpf    WBC, UA 1 0 - 5 /hpf    Bacteria, UA Occasional None-Occ /hpf    Hyaline Casts, UA 2 (A) 0-1/lpf /lpf    Granular Casts, UA 1 (A) None /lpf    Amorphous, UA Few None-Moderate    Microscopic Comment SEE COMMENT    Lactic acid, plasma #3   Result Value Ref Range    Lactate (Lactic Acid) 1.0 0.5 - 2.2 mmol/L   CBC auto differential   Result Value Ref Range    WBC 5.51 3.90 - 12.70 K/uL    RBC 3.67 (L) 4.00 - 5.40 M/uL    Hemoglobin 10.8 (L) 12.0 - 16.0 g/dL    Hematocrit 33.1 (L) 37.0 - 48.5 %    MCV 90 82 - 98 fL    MCH 29.4 27.0 - 31.0 pg    MCHC 32.6 32.0 - 36.0 g/dL    RDW 14.4 11.5 - 14.5 %    Platelets 156 150 - 350 K/uL    MPV 10.0 9.2 - 12.9 fL    Gran # 4.7 1.8 - 7.7 K/uL    Lymph # 0.5 (L) 1.0 - 4.8 K/uL    Mono # 0.3 0.3 - 1.0 K/uL    Eos # 0.0 0.0 - 0.5 K/uL    Baso # 0.02 0.00 - 0.20 K/uL    Gran% 84.6 (H) 38.0 - 73.0 %    Lymph% 9.6 (L) 18.0 - 48.0 %    Mono% 5.4 4.0 - 15.0 %    Eosinophil% 0.0 0.0 - 8.0 %    Basophil% 0.4 0.0 - 1.9 %    Differential Method Automated    Basic metabolic panel    Result Value Ref Range    Sodium 136 136 - 145 mmol/L    Potassium 3.3 (L) 3.5 - 5.1 mmol/L    Chloride 101 95 - 110 mmol/L    CO2 24 23 - 29 mmol/L    Glucose 204 (H) 70 - 110 mg/dL    BUN, Bld 20 10 - 30 mg/dL    Creatinine 0.8 0.5 - 1.4 mg/dL    Calcium 7.7 (L) 8.7 - 10.5 mg/dL    Anion Gap 11 8 - 16 mmol/L    eGFR if African American >60 >60 mL/min/1.73 m^2    eGFR if non African American >60 >60 mL/min/1.73 m^2   Lipid panel - fasting   Result Value Ref Range    Cholesterol 126 120 - 199 mg/dL    Triglycerides 65 30 - 150 mg/dL    HDL 49 40 - 75 mg/dL    LDL Cholesterol 64.0 63.0 - 159.0 mg/dL    HDL/Chol Ratio 38.9 20.0 - 50.0 %    Total Cholesterol/HDL Ratio 2.6 2.0 - 5.0    Non-HDL Cholesterol 77  mg/dL   Procalcitonin   Result Value Ref Range    Procalcitonin 0.57 (H) <0.25 ng/mL   Troponin I   Result Value Ref Range    Troponin I 5.284 (H) 0.000 - 0.026 ng/mL   Hemoglobin A1c   Result Value Ref Range    Hemoglobin A1C 7.1 (H) 4.0 - 5.6 %    Estimated Avg Glucose 157 (H) 68 - 131 mg/dL   Troponin I   Result Value Ref Range    Troponin I 5.573 (H) 0.000 - 0.026 ng/mL   Procalcitonin   Result Value Ref Range    Procalcitonin 0.62 (H) <0.25 ng/mL   Protime-INR   Result Value Ref Range    Prothrombin Time 10.0 9.0 - 12.5 sec    INR 1.0 0.8 - 1.2   APTT   Result Value Ref Range    aPTT 38.0 (H) 21.0 - 32.0 sec   CK-MB   Result Value Ref Range     (H) 20 - 180 U/L    CPK MB 12.7 (H) 0.1 - 6.5 ng/mL    MB% 3.7 0.0 - 5.0 %   CK   Result Value Ref Range     (H) 20 - 180 U/L   Troponin I   Result Value Ref Range    Troponin I 5.148 (H) 0.000 - 0.026 ng/mL   APTT   Result Value Ref Range    aPTT @rna 21.0 - 32.0 sec   APTT   Result Value Ref Range    aPTT >150.0 (AA) 21.0 - 32.0 sec   CBC auto differential   Result Value Ref Range    WBC 5.10 3.90 - 12.70 K/uL    RBC 3.95 (L) 4.00 - 5.40 M/uL    Hemoglobin 11.7 (L) 12.0 - 16.0 g/dL    Hematocrit 35.8 (L) 37.0 - 48.5 %    MCV 91 82 - 98 fL    MCH 29.6 27.0 - 31.0 pg    MCHC 32.7 32.0 - 36.0 g/dL    RDW 14.4 11.5 - 14.5 %    Platelets 187 150 - 350 K/uL    MPV 9.9 9.2 - 12.9 fL    Gran # 3.9 1.8 - 7.7 K/uL    Lymph # 0.9 (L) 1.0 - 4.8 K/uL    Mono # 0.3 0.3 - 1.0 K/uL    Eos # 0.0 0.0 - 0.5 K/uL    Baso # 0.01 0.00 - 0.20 K/uL    Gran% 77.0 (H) 38.0 - 73.0 %    Lymph% 17.1 (L) 18.0 - 48.0 %    Mono% 5.7 4.0 - 15.0 %    Eosinophil% 0.0 0.0 - 8.0 %    Basophil% 0.2 0.0 - 1.9 %    Differential Method Automated    Basic metabolic panel    Result Value Ref Range    Sodium 139 136 - 145 mmol/L    Potassium 2.9 (L) 3.5 - 5.1 mmol/L    Chloride 100 95 - 110 mmol/L    CO2 26 23 - 29 mmol/L    Glucose 103 70 - 110 mg/dL    BUN, Bld 18 10 - 30 mg/dL    Creatinine 0.8  0.5 - 1.4 mg/dL    Calcium 8.0 (L) 8.7 - 10.5 mg/dL    Anion Gap 13 8 - 16 mmol/L    eGFR if African American >60 >60 mL/min/1.73 m^2    eGFR if non African American >60 >60 mL/min/1.73 m^2   Troponin I   Result Value Ref Range    Troponin I 3.316 (H) 0.000 - 0.026 ng/mL   Procalcitonin   Result Value Ref Range    Procalcitonin 0.58 (H) <0.25 ng/mL   APTT   Result Value Ref Range    aPTT 98.4 (H) 21.0 - 32.0 sec   Magnesium   Result Value Ref Range    Magnesium 1.4 (L) 1.6 - 2.6 mg/dL   APTT   Result Value Ref Range    aPTT >150.0 (AA) 21.0 - 32.0 sec   APTT   Result Value Ref Range    aPTT 88.1 (H) 21.0 - 32.0 sec   CBC auto differential   Result Value Ref Range    WBC 7.15 3.90 - 12.70 K/uL    RBC 3.79 (L) 4.00 - 5.40 M/uL    Hemoglobin 11.4 (L) 12.0 - 16.0 g/dL    Hematocrit 34.4 (L) 37.0 - 48.5 %    MCV 91 82 - 98 fL    MCH 30.1 27.0 - 31.0 pg    MCHC 33.1 32.0 - 36.0 g/dL    RDW 14.5 11.5 - 14.5 %    Platelets 226 150 - 350 K/uL    MPV 9.7 9.2 - 12.9 fL    Gran # 6.4 1.8 - 7.7 K/uL    Lymph # 0.4 (L) 1.0 - 4.8 K/uL    Mono # 0.3 0.3 - 1.0 K/uL    Eos # 0.0 0.0 - 0.5 K/uL    Baso # 0.01 0.00 - 0.20 K/uL    Gran% 90.1 (H) 38.0 - 73.0 %    Lymph% 5.3 (L) 18.0 - 48.0 %    Mono% 4.5 4.0 - 15.0 %    Eosinophil% 0.0 0.0 - 8.0 %    Basophil% 0.1 0.0 - 1.9 %    Differential Method Automated    Basic metabolic panel    Result Value Ref Range    Sodium 137 136 - 145 mmol/L    Potassium 3.5 3.5 - 5.1 mmol/L    Chloride 100 95 - 110 mmol/L    CO2 24 23 - 29 mmol/L    Glucose 262 (H) 70 - 110 mg/dL    BUN, Bld 33 (H) 10 - 30 mg/dL    Creatinine 1.2 0.5 - 1.4 mg/dL    Calcium 8.4 (L) 8.7 - 10.5 mg/dL    Anion Gap 13 8 - 16 mmol/L    eGFR if African American 46 (A) >60 mL/min/1.73 m^2    eGFR if non African American 40 (A) >60 mL/min/1.73 m^2   APTT   Result Value Ref Range    aPTT 51.1 (H) 21.0 - 32.0 sec   Magnesium   Result Value Ref Range    Magnesium 1.9 1.6 - 2.6 mg/dL   Magnesium   Result Value Ref Range    Magnesium  2.0 1.6 - 2.6 mg/dL   Basic metabolic panel   Result Value Ref Range    Sodium 138 136 - 145 mmol/L    Potassium 3.1 (L) 3.5 - 5.1 mmol/L    Chloride 100 95 - 110 mmol/L    CO2 24 23 - 29 mmol/L    Glucose 235 (H) 70 - 110 mg/dL    BUN, Bld 44 (H) 10 - 30 mg/dL    Creatinine 1.3 0.5 - 1.4 mg/dL    Calcium 8.4 (L) 8.7 - 10.5 mg/dL    Anion Gap 14 8 - 16 mmol/L    eGFR if African American 41 (A) >60 mL/min/1.73 m^2    eGFR if non African American 36 (A) >60 mL/min/1.73 m^2   CBC auto differential   Result Value Ref Range    WBC 14.18 (H) 3.90 - 12.70 K/uL    RBC 4.03 4.00 - 5.40 M/uL    Hemoglobin 12.2 12.0 - 16.0 g/dL    Hematocrit 36.2 (L) 37.0 - 48.5 %    MCV 90 82 - 98 fL    MCH 30.3 27.0 - 31.0 pg    MCHC 33.7 32.0 - 36.0 g/dL    RDW 14.4 11.5 - 14.5 %    Platelets 256 150 - 350 K/uL    MPV 9.5 9.2 - 12.9 fL    Gran # 13.2 (H) 1.8 - 7.7 K/uL    Lymph # 0.5 (L) 1.0 - 4.8 K/uL    Mono # 0.5 0.3 - 1.0 K/uL    Eos # 0.0 0.0 - 0.5 K/uL    Baso # 0.00 0.00 - 0.20 K/uL    Gran% 93.5 (H) 38.0 - 73.0 %    Lymph% 3.2 (L) 18.0 - 48.0 %    Mono% 3.6 (L) 4.0 - 15.0 %    Eosinophil% 0.0 0.0 - 8.0 %    Basophil% 0.0 0.0 - 1.9 %    Differential Method Automated    Magnesium   Result Value Ref Range    Magnesium 2.2 1.6 - 2.6 mg/dL   Basic metabolic panel   Result Value Ref Range    Sodium 141 136 - 145 mmol/L    Potassium 3.0 (L) 3.5 - 5.1 mmol/L    Chloride 104 95 - 110 mmol/L    CO2 27 23 - 29 mmol/L    Glucose 38 (LL) 70 - 110 mg/dL    BUN, Bld 42 (H) 10 - 30 mg/dL    Creatinine 1.1 0.5 - 1.4 mg/dL    Calcium 8.7 8.7 - 10.5 mg/dL    Anion Gap 10 8 - 16 mmol/L    eGFR if African American 51 (A) >60 mL/min/1.73 m^2    eGFR if non African American 44 (A) >60 mL/min/1.73 m^2   CBC auto differential   Result Value Ref Range    WBC 21.38 (H) 3.90 - 12.70 K/uL    RBC 4.22 4.00 - 5.40 M/uL    Hemoglobin 12.8 12.0 - 16.0 g/dL    Hematocrit 38.1 37.0 - 48.5 %    MCV 90 82 - 98 fL    MCH 30.3 27.0 - 31.0 pg    MCHC 33.6 32.0 - 36.0 g/dL     RDW 14.5 11.5 - 14.5 %    Platelets 372 (H) 150 - 350 K/uL    MPV 9.8 9.2 - 12.9 fL    Lymph # CANCELED 1.0 - 4.8 K/uL    Mono # CANCELED 0.3 - 1.0 K/uL    Eos # CANCELED 0.0 - 0.5 K/uL    Baso # CANCELED 0.00 - 0.20 K/uL    Gran% 89.0 (H) 38.0 - 73.0 %    Lymph% 7.0 (L) 18.0 - 48.0 %    Mono% 4.0 4.0 - 15.0 %    Eosinophil% 0.0 0.0 - 8.0 %    Basophil% 0.0 0.0 - 1.9 %    Differential Method Manual    2D echo with color flow doppler   Result Value Ref Range    EF 60 55 - 65    Est. PA Systolic Pressure 39.44     Tricuspid Valve Regurgitation MILD    ISTAT PROCEDURE   Result Value Ref Range    POC PH 7.315 (L) 7.35 - 7.45    POC PCO2 52.3 (H) 35 - 45 mmHg    POC PO2 70 (L) 80 - 100 mmHg    POC HCO3 26.6 24 - 28 mmol/L    POC BE 0 -2 to 2 mmol/L    POC SATURATED O2 92 (L) 95 - 100 %    Rate 16     Sample ARTERIAL     Site RR     Allens Test Pass     DelSys CPAP/BiPAP     Mode BiPAP     FiO2 50     Sp02 95     IP 10     EP 6    POCT glucose   Result Value Ref Range    POCT Glucose 195 (H) 70 - 110 mg/dL   ISTAT PROCEDURE   Result Value Ref Range    POC PH 7.403 7.35 - 7.45    POC PCO2 43.6 35 - 45 mmHg    POC PO2 59 (LL) 80 - 100 mmHg    POC HCO3 27.2 24 - 28 mmol/L    POC BE 2 -2 to 2 mmol/L    POC SATURATED O2 90 (L) 95 - 100 %    Sample ARTERIAL     Site LR     Allens Test Pass     DelSys Venti Mask     Mode SPONT     FiO2 40    POCT glucose   Result Value Ref Range    POCT Glucose 142 (H) 70 - 110 mg/dL   POCT glucose   Result Value Ref Range    POCT Glucose 137 (H) 70 - 110 mg/dL   POCT glucose   Result Value Ref Range    POCT Glucose 110 70 - 110 mg/dL   ISTAT PROCEDURE   Result Value Ref Range    POC PH 7.373 7.35 - 7.45    POC PCO2 47.5 (H) 35 - 45 mmHg    POC PO2 74 (L) 80 - 100 mmHg    POC HCO3 27.6 24 - 28 mmol/L    POC BE 2 -2 to 2 mmol/L    POC SATURATED O2 94 (L) 95 - 100 %    Rate 16     Sample ARTERIAL     Site LR     Allens Test Pass     DelSys CPAP/BiPAP     Mode BiPAP     FiO2 50     IP 10     EP  6    POCT glucose   Result Value Ref Range    POCT Glucose 262 (H) 70 - 110 mg/dL   POCT glucose   Result Value Ref Range    POCT Glucose 228 (H) 70 - 110 mg/dL   POCT glucose   Result Value Ref Range    POCT Glucose 258 (H) 70 - 110 mg/dL   POCT glucose   Result Value Ref Range    POCT Glucose 260 (H) 70 - 110 mg/dL   POCT glucose   Result Value Ref Range    POCT Glucose 291 (H) 70 - 110 mg/dL   POCT glucose   Result Value Ref Range    POCT Glucose 292 (H) 70 - 110 mg/dL   POCT glucose   Result Value Ref Range    POCT Glucose 254 (H) 70 - 110 mg/dL   POCT glucose   Result Value Ref Range    POCT Glucose 223 (H) 70 - 110 mg/dL   POCT glucose   Result Value Ref Range    POCT Glucose 189 (H) 70 - 110 mg/dL   POCT glucose   Result Value Ref Range    POCT Glucose 154 (H) 70 - 110 mg/dL   POCT glucose   Result Value Ref Range    POCT Glucose 43 (LL) 70 - 110 mg/dL   POCT glucose   Result Value Ref Range    POCT Glucose 166 (H) 70 - 110 mg/dL   POCT glucose   Result Value Ref Range    POCT Glucose 97 70 - 110 mg/dL   POCT glucose   Result Value Ref Range    POCT Glucose 44 (LL) 70 - 110 mg/dL   POCT glucose   Result Value Ref Range    POCT Glucose 49 (LL) 70 - 110 mg/dL   POCT glucose   Result Value Ref Range    POCT Glucose 127 (H) 70 - 110 mg/dL   POCT glucose   Result Value Ref Range    POCT Glucose 75 70 - 110 mg/dL   POCT glucose   Result Value Ref Range    POCT Glucose 101 70 - 110 mg/dL         Imaging Results:  Imaging Results          CTA Chest Non Coronary (Final result)  Result time 12/24/17 09:55:12    Final result by Hiram Metzger III, MD (12/24/17 09:55:12)                 Narrative:    CT angiogram of the chest.    Clinical indication: Hypoxemia. Sarcoidosis.    Standard and MIPS/3-D images submitted.    There are bilateral pleural effusions of a small to slightly moderate degree. There are small mediastinal nodes throughout of indeterminate significance and etiology. There is opacification of the  thoracic aorta without evidence of aneurysm formation or dissection. There is opacification of the pulmonary arterial system. There no gross filling defects within the major branches.    There are chronic lung changes bilaterally. Extensive groundglass infiltrates are noted bilaterally scattered throughout both lungs with partial consolidation posteriorly at the lung bases. There is also partial consolidation along the perihilar cysts or perihilar zones, especially on the right anteromedially involving the upper lobe.    The upper abdomen shows no acute abnormality.    1. Bilateral pleural effusions. Extensive bilateral infiltrates/pneumonia. Chronic lung changes.    2. Negative for acute thoracic aortic aneurysm or dissection. Negative for acute pulmonary emboli.          Electronically signed by: CARLOS DAVID MD  Date:     12/24/17  Time:    09:55                              X-Ray Chest 1 View (Final result)  Result time 12/23/17 08:55:16    Final result by Nick Merritt MD (12/23/17 08:55:16)                 Impression:     No significant change. Patchy bilateral infiltrates, left greater than right.      Electronically signed by: NIKC MERRITT MD  Date:     12/23/17  Time:    08:55              Narrative:    History: Shortness of breath    Normal heart size. Patchy infiltrates throughout left lung appear unchanged from prior day. There is coarsening of bronchovascular markings bilaterally. Mild patchy infiltrates in the right mid and lower lung.                             X-Ray Chest AP Portable (Final result)  Result time 12/22/17 22:21:09    Final result by Nick Reed MD (12/22/17 22:21:09)                 Impression:         Patchy infiltrates throughout the left lung.      Electronically signed by: NICK REED MD  Date:     12/22/17  Time:    22:21              Narrative:    Exam: XR CHEST AP PORTABLE,    Date:  12/22/17 22:12:48    History: Sepsis.    Comparison:  No prior   studies available for comparison.    Findings:     Patchy infiltrates scattered about the left lung.  Question of a mild infiltrate right lung base.  Heart size within normal limits.  Vascular calcification of aorta.  No significant bony findings.                             The EKG was ordered, reviewed, and independently interpreted by the ED provider.  Interpretation time: 21:33  Rate: 88 BPM  Rhythm: normal sinus rhythm  Interpretation: Left axis deviation. Left ventricular hypertrophy with QRS widening and repolarization. No STEMI.         The Emergency Provider reviewed the vital signs and test results, which are outlined above.    ED Discussion     10:50 PM: Re-evaluated pt. Pt states she has been having CP throughout the day. I have discussed test results, shared treatment plan, and the need for admission with patient and family at bedside. Pt and family express understanding at this time and agree with all information. All questions answered. Pt and family have no further questions or concerns at this time. Pt is ready for admit.    10:54 PM: Discussed case with Hiram Tinajero NP (Hospital Medicine). Dr. Salgado agrees with current care and management of pt and accepts admission.   Admitting Service: Hospital medicine   Admitting Physician: Dr. Salgado  Admit to: ICU    11:19 PM: Discussed pt's case with Dr. Nolasco (Cardiology) who recommends following pt's troponin. He does not recommend heparinization at this time.      ED Medication(s):  Medications   levothyroxine tablet 100 mcg (100 mcg Oral Given 12/27/17 0554)   lisinopril tablet 40 mg (40 mg Oral Given 12/27/17 2158)   memantine tablet 5 mg (5 mg Oral Given 12/27/17 2158)   sertraline tablet 50 mg (50 mg Oral Given 12/27/17 0844)   simvastatin tablet 20 mg (20 mg Oral Given 12/27/17 2158)   pantoprazole EC tablet 40 mg (40 mg Oral Given 12/27/17 0845)   albuterol-ipratropium 2.5mg-0.5mg/3mL nebulizer solution 3 mL (3 mLs Nebulization Given 12/26/17 0825)    glucose chewable tablet 16 g (16 g Oral Not Given 12/27/17 1759)   glucose chewable tablet 24 g (24 g Oral Not Given 12/27/17 0606)   dextrose 50% injection 12.5 g (12.5 g Intravenous Given 12/27/17 1804)   dextrose 50% injection 25 g (25 g Intravenous Given 12/27/17 0606)   glucagon (human recombinant) injection 1 mg (not administered)   insulin aspart pen 1-10 Units (1 Units Subcutaneous Given 12/26/17 2047)   acetaminophen tablet 650 mg (650 mg Oral Given 12/23/17 2202)   amLODIPine tablet 10 mg (10 mg Oral Given 12/27/17 0844)   piperacillin-tazobactam 4.5 g in sodium chloride 0.9% 100 mL IVPB (ready to mix system) (4.5 g Intravenous New Bag 12/28/17 0424)   hydrALAZINE injection 10 mg (10 mg Intravenous Given 12/26/17 2355)   aspirin chewable tablet 81 mg (81 mg Oral Given 12/27/17 0844)   clopidogrel tablet 75 mg (75 mg Oral Given 12/27/17 0844)   linezolid 600mg/300ml IVPB 600 mg (600 mg Intravenous New Bag 12/28/17 0015)   isosorbide mononitrate 24 hr tablet 30 mg (30 mg Oral Given 12/27/17 0845)   insulin detemir pen 15 Units (15 Units Subcutaneous Not Given 12/27/17 2158)   heparin (porcine) injection 5,000 Units (5,000 Units Subcutaneous Given 12/27/17 2158)   zinc oxide-petrolatum 20-51% topical paste ( Topical (Top) Given 12/27/17 2159)   balsalazide capsule 750 mg (750 mg Oral Given 12/27/17 2158)   nitroGLYCERIN 2% TD oint ointment 1 inch (1 inch Topical (Top) Given 12/27/17 2323)   sodium chloride 0.9% bolus 1,000 mL (0 mLs Intravenous Stopped 12/22/17 2300)   piperacillin-tazobactam 4.5 g in sodium chloride 0.9% 100 mL IVPB (ready to mix system) (0 g Intravenous Stopped 12/22/17 2351)   furosemide injection 40 mg (40 mg Intravenous Given 12/22/17 2337)   aspirin EC tablet 325 mg (325 mg Oral Given 12/22/17 4286)   vancomycin 1 g in dextrose 5 % 250 mL IVPB (ready to mix system) (1,000 mg Intravenous New Bag 12/23/17 0037)   furosemide injection 60 mg (60 mg Intravenous Given 12/23/17 0819)    potassium chloride SA CR tablet 40 mEq (0 mEq Oral Return to Cabinet 12/23/17 0819)   furosemide injection 40 mg (40 mg Intravenous Given 12/24/17 0943)   heparin 25,000 units in dextrose 5% 250 mL (100 units/mL) bolus from bag; INITIAL BOLUS DOSE (4,970 Units Intravenous Bolus from Bag 12/23/17 1009)   potassium chloride 10% solution 40 mEq (40 mEq Oral Given 12/23/17 0843)   potassium chloride SA CR tablet 20 mEq (20 mEq Oral Given 12/24/17 0944)   omnipaque 350 iohexol 100 mL (100 mLs Intravenous Given 12/24/17 0913)   enalaprilat injection 1.25 mg (1.25 mg Intravenous Given 12/24/17 1110)   albuterol sulfate nebulizer solution 2.5 mg (2.5 mg Nebulization Given 12/24/17 1055)   potassium chloride SA CR tablet 40 mEq (40 mEq Oral Given 12/26/17 1300)       Current Discharge Medication List                Medical Decision Making    Medical Decision Making:   Clinical Tests:   Lab Tests: Reviewed and Ordered  Radiological Study: Reviewed and Ordered  Medical Tests: Reviewed and Ordered           Scribe Attestation:   Scribe #1: I performed the above scribed service and the documentation accurately describes the services I performed. I attest to the accuracy of the note.    Attending:   Physician Attestation Statement for Scribe #1: I, Victorino Del Valle Jr., MD, personally performed the services described in this documentation, as scribed by Michel Pringle, in my presence, and it is both accurate and complete.       Scribe Attestation:   Scribe #2: I performed the above scribed service and the documentation accurately describes the services I performed. I attest to the accuracy of the note.    Attending Attestation:           Physician Attestation for Scribe:    Physician Attestation Statement for Scribe #2: I, Victorino Del Valle MD, reviewed documentation, as scribed by Huong Scanlon in my presence, and it is both accurate and complete. I also acknowledge and confirm the content of the note done by Nash  #1.          Clinical Impression       ICD-10-CM ICD-9-CM   1. Pneumonia of left lower lobe due to infectious organism J18.1 486   2. SOB (shortness of breath) R06.02 786.05   3. Elevated troponin R74.8 790.6   4. CHF (congestive heart failure) I50.9 428.0   5. Acute respiratory failure with hypoxia and hypercarbia J96.01 518.81    J96.02    6. Chest discomfort R07.89 786.59   7. Chest pain R07.9 786.50       Disposition:   Disposition: Admitted  Condition: Kirt Del Valle Jr., MD  12/28/17 0531

## 2017-12-24 LAB
ALLENS TEST: ABNORMAL
ANION GAP SERPL CALC-SCNC: 13 MMOL/L
APTT BLDCRRT: 88.1 SEC
APTT BLDCRRT: 98.4 SEC
APTT BLDCRRT: >150 SEC
APTT BLDCRRT: >150 SEC
BACTERIA UR CULT: NO GROWTH
BASOPHILS # BLD AUTO: 0.01 K/UL
BASOPHILS NFR BLD: 0.2 %
BUN SERPL-MCNC: 18 MG/DL
CALCIUM SERPL-MCNC: 8 MG/DL
CHLORIDE SERPL-SCNC: 100 MMOL/L
CO2 SERPL-SCNC: 26 MMOL/L
CREAT SERPL-MCNC: 0.8 MG/DL
DELSYS: ABNORMAL
DIFFERENTIAL METHOD: ABNORMAL
EOSINOPHIL # BLD AUTO: 0 K/UL
EOSINOPHIL NFR BLD: 0 %
EP: 6
ERYTHROCYTE [DISTWIDTH] IN BLOOD BY AUTOMATED COUNT: 14.4 %
ERYTHROCYTE [SEDIMENTATION RATE] IN BLOOD BY WESTERGREN METHOD: 16 MM/H
EST. GFR  (AFRICAN AMERICAN): >60 ML/MIN/1.73 M^2
EST. GFR  (NON AFRICAN AMERICAN): >60 ML/MIN/1.73 M^2
FIO2: 50
GLUCOSE SERPL-MCNC: 103 MG/DL
HCO3 UR-SCNC: 27.6 MMOL/L (ref 24–28)
HCT VFR BLD AUTO: 35.8 %
HGB BLD-MCNC: 11.7 G/DL
IP: 10
LYMPHOCYTES # BLD AUTO: 0.9 K/UL
LYMPHOCYTES NFR BLD: 17.1 %
MAGNESIUM SERPL-MCNC: 1.4 MG/DL
MCH RBC QN AUTO: 29.6 PG
MCHC RBC AUTO-ENTMCNC: 32.7 G/DL
MCV RBC AUTO: 91 FL
MODE: ABNORMAL
MONOCYTES # BLD AUTO: 0.3 K/UL
MONOCYTES NFR BLD: 5.7 %
NEUTROPHILS # BLD AUTO: 3.9 K/UL
NEUTROPHILS NFR BLD: 77 %
PCO2 BLDA: 47.5 MMHG (ref 35–45)
PH SMN: 7.37 [PH] (ref 7.35–7.45)
PLATELET # BLD AUTO: 187 K/UL
PMV BLD AUTO: 9.9 FL
PO2 BLDA: 74 MMHG (ref 80–100)
POC BE: 2 MMOL/L
POC SATURATED O2: 94 % (ref 95–100)
POCT GLUCOSE: 110 MG/DL (ref 70–110)
POCT GLUCOSE: 228 MG/DL (ref 70–110)
POCT GLUCOSE: 262 MG/DL (ref 70–110)
POTASSIUM SERPL-SCNC: 2.9 MMOL/L
PROCALCITONIN SERPL IA-MCNC: 0.58 NG/ML
RBC # BLD AUTO: 3.95 M/UL
SAMPLE: ABNORMAL
SITE: ABNORMAL
SODIUM SERPL-SCNC: 139 MMOL/L
TROPONIN I SERPL DL<=0.01 NG/ML-MCNC: 3.32 NG/ML
WBC # BLD AUTO: 5.1 K/UL

## 2017-12-24 PROCEDURE — 85025 COMPLETE CBC W/AUTO DIFF WBC: CPT

## 2017-12-24 PROCEDURE — 36415 COLL VENOUS BLD VENIPUNCTURE: CPT

## 2017-12-24 PROCEDURE — 84145 PROCALCITONIN (PCT): CPT

## 2017-12-24 PROCEDURE — 94660 CPAP INITIATION&MGMT: CPT

## 2017-12-24 PROCEDURE — 99232 SBSQ HOSP IP/OBS MODERATE 35: CPT | Mod: ,,, | Performed by: INTERNAL MEDICINE

## 2017-12-24 PROCEDURE — 25000003 PHARM REV CODE 250

## 2017-12-24 PROCEDURE — 63600175 PHARM REV CODE 636 W HCPCS: Performed by: NURSE PRACTITIONER

## 2017-12-24 PROCEDURE — 25000003 PHARM REV CODE 250: Performed by: INTERNAL MEDICINE

## 2017-12-24 PROCEDURE — 87070 CULTURE OTHR SPECIMN AEROBIC: CPT

## 2017-12-24 PROCEDURE — 36600 WITHDRAWAL OF ARTERIAL BLOOD: CPT

## 2017-12-24 PROCEDURE — 27000221 HC OXYGEN, UP TO 24 HOURS

## 2017-12-24 PROCEDURE — 94640 AIRWAY INHALATION TREATMENT: CPT

## 2017-12-24 PROCEDURE — 25000242 PHARM REV CODE 250 ALT 637 W/ HCPCS: Performed by: INTERNAL MEDICINE

## 2017-12-24 PROCEDURE — 63600175 PHARM REV CODE 636 W HCPCS: Performed by: INTERNAL MEDICINE

## 2017-12-24 PROCEDURE — 85730 THROMBOPLASTIN TIME PARTIAL: CPT | Mod: 91

## 2017-12-24 PROCEDURE — 94668 MNPJ CHEST WALL SBSQ: CPT

## 2017-12-24 PROCEDURE — 99900035 HC TECH TIME PER 15 MIN (STAT)

## 2017-12-24 PROCEDURE — 85730 THROMBOPLASTIN TIME PARTIAL: CPT

## 2017-12-24 PROCEDURE — 87205 SMEAR GRAM STAIN: CPT

## 2017-12-24 PROCEDURE — 99291 CRITICAL CARE FIRST HOUR: CPT | Mod: ,,, | Performed by: INTERNAL MEDICINE

## 2017-12-24 PROCEDURE — 84484 ASSAY OF TROPONIN QUANT: CPT

## 2017-12-24 PROCEDURE — 25000003 PHARM REV CODE 250: Performed by: NURSE PRACTITIONER

## 2017-12-24 PROCEDURE — 25500020 PHARM REV CODE 255

## 2017-12-24 PROCEDURE — 80048 BASIC METABOLIC PNL TOTAL CA: CPT

## 2017-12-24 PROCEDURE — 20000000 HC ICU ROOM

## 2017-12-24 PROCEDURE — 82803 BLOOD GASES ANY COMBINATION: CPT

## 2017-12-24 PROCEDURE — 94667 MNPJ CHEST WALL 1ST: CPT

## 2017-12-24 PROCEDURE — 83735 ASSAY OF MAGNESIUM: CPT

## 2017-12-24 RX ORDER — AMLODIPINE BESYLATE 10 MG/1
10 TABLET ORAL DAILY
Status: DISCONTINUED | OUTPATIENT
Start: 2017-12-24 | End: 2017-12-29 | Stop reason: HOSPADM

## 2017-12-24 RX ORDER — CLOPIDOGREL BISULFATE 75 MG/1
75 TABLET ORAL DAILY
Status: DISCONTINUED | OUTPATIENT
Start: 2017-12-24 | End: 2017-12-29 | Stop reason: HOSPADM

## 2017-12-24 RX ORDER — ENALAPRILAT 1.25 MG/ML
1.25 INJECTION INTRAVENOUS ONCE
Status: COMPLETED | OUTPATIENT
Start: 2017-12-24 | End: 2017-12-24

## 2017-12-24 RX ORDER — MAGNESIUM SULFATE 1 G/100ML
1 INJECTION INTRAVENOUS ONCE
Status: DISCONTINUED | OUTPATIENT
Start: 2017-12-24 | End: 2017-12-24

## 2017-12-24 RX ORDER — ACETYLCYSTEINE 200 MG/ML
2 SOLUTION ORAL; RESPIRATORY (INHALATION) 2 TIMES DAILY
Status: DISCONTINUED | OUTPATIENT
Start: 2017-12-24 | End: 2017-12-25

## 2017-12-24 RX ORDER — HYDRALAZINE HYDROCHLORIDE 20 MG/ML
10 INJECTION INTRAMUSCULAR; INTRAVENOUS EVERY 8 HOURS PRN
Status: DISCONTINUED | OUTPATIENT
Start: 2017-12-24 | End: 2017-12-29 | Stop reason: HOSPADM

## 2017-12-24 RX ORDER — POTASSIUM CHLORIDE 20 MEQ/1
20 TABLET, EXTENDED RELEASE ORAL
Status: COMPLETED | OUTPATIENT
Start: 2017-12-24 | End: 2017-12-24

## 2017-12-24 RX ORDER — POTASSIUM CHLORIDE 20 MEQ/1
20 TABLET, EXTENDED RELEASE ORAL
Status: DISCONTINUED | OUTPATIENT
Start: 2017-12-24 | End: 2017-12-24

## 2017-12-24 RX ORDER — ALBUTEROL SULFATE 2.5 MG/.5ML
2.5 SOLUTION RESPIRATORY (INHALATION) ONCE
Status: COMPLETED | OUTPATIENT
Start: 2017-12-24 | End: 2017-12-24

## 2017-12-24 RX ORDER — MAGNESIUM SULFATE 1 G/100ML
1 INJECTION INTRAVENOUS ONCE
Status: DISCONTINUED | OUTPATIENT
Start: 2017-12-24 | End: 2017-12-26

## 2017-12-24 RX ORDER — NAPROXEN SODIUM 220 MG/1
81 TABLET, FILM COATED ORAL DAILY
Status: DISCONTINUED | OUTPATIENT
Start: 2017-12-24 | End: 2017-12-29 | Stop reason: HOSPADM

## 2017-12-24 RX ADMIN — ENALAPRILAT 1.25 MG: 1.25 INJECTION INTRAVENOUS at 11:12

## 2017-12-24 RX ADMIN — METHYLPREDNISOLONE SODIUM SUCCINATE 40 MG: 40 INJECTION, POWDER, FOR SOLUTION INTRAMUSCULAR; INTRAVENOUS at 11:12

## 2017-12-24 RX ADMIN — SERTRALINE HYDROCHLORIDE 50 MG: 50 TABLET ORAL at 08:12

## 2017-12-24 RX ADMIN — INSULIN ASPART 6 UNITS: 100 INJECTION, SOLUTION INTRAVENOUS; SUBCUTANEOUS at 11:12

## 2017-12-24 RX ADMIN — IPRATROPIUM BROMIDE AND ALBUTEROL SULFATE 3 ML: .5; 3 SOLUTION RESPIRATORY (INHALATION) at 07:12

## 2017-12-24 RX ADMIN — INSULIN ASPART 4 UNITS: 100 INJECTION, SOLUTION INTRAVENOUS; SUBCUTANEOUS at 05:12

## 2017-12-24 RX ADMIN — METHYLPREDNISOLONE SODIUM SUCCINATE 40 MG: 40 INJECTION, POWDER, FOR SOLUTION INTRAMUSCULAR; INTRAVENOUS at 05:12

## 2017-12-24 RX ADMIN — POTASSIUM CHLORIDE 20 MEQ: 1500 TABLET, EXTENDED RELEASE ORAL at 05:12

## 2017-12-24 RX ADMIN — ALBUTEROL SULFATE 2.5 MG: 2.5 SOLUTION RESPIRATORY (INHALATION) at 10:12

## 2017-12-24 RX ADMIN — ACETYLCYSTEINE 2 ML: 200 SOLUTION ORAL; RESPIRATORY (INHALATION) at 10:12

## 2017-12-24 RX ADMIN — NITROGLYCERIN 1 INCH: 20 OINTMENT TOPICAL at 05:12

## 2017-12-24 RX ADMIN — POTASSIUM CHLORIDE 20 MEQ: 1500 TABLET, EXTENDED RELEASE ORAL at 08:12

## 2017-12-24 RX ADMIN — SIMVASTATIN 20 MG: 20 TABLET, FILM COATED ORAL at 09:12

## 2017-12-24 RX ADMIN — LISINOPRIL 40 MG: 20 TABLET ORAL at 09:12

## 2017-12-24 RX ADMIN — MOXIFLOXACIN HYDROCHLORIDE 400 MG: 400 INJECTION, SOLUTION INTRAVENOUS at 12:12

## 2017-12-24 RX ADMIN — INSULIN ASPART 3 UNITS: 100 INJECTION, SOLUTION INTRAVENOUS; SUBCUTANEOUS at 09:12

## 2017-12-24 RX ADMIN — MEMANTINE HYDROCHLORIDE 5 MG: 5 TABLET ORAL at 09:12

## 2017-12-24 RX ADMIN — MAGNESIUM SULFATE IN DEXTROSE 1 G: 10 INJECTION, SOLUTION INTRAVENOUS at 09:12

## 2017-12-24 RX ADMIN — PANTOPRAZOLE SODIUM 40 MG: 40 TABLET, DELAYED RELEASE ORAL at 08:12

## 2017-12-24 RX ADMIN — POTASSIUM CHLORIDE 20 MEQ: 1500 TABLET, EXTENDED RELEASE ORAL at 09:12

## 2017-12-24 RX ADMIN — ACETYLCYSTEINE 2 ML: 200 SOLUTION ORAL; RESPIRATORY (INHALATION) at 07:12

## 2017-12-24 RX ADMIN — METHYLPREDNISOLONE SODIUM SUCCINATE 40 MG: 40 INJECTION, POWDER, FOR SOLUTION INTRAMUSCULAR; INTRAVENOUS at 08:12

## 2017-12-24 RX ADMIN — AMLODIPINE BESYLATE 10 MG: 10 TABLET ORAL at 08:12

## 2017-12-24 RX ADMIN — IOHEXOL 100 ML: 350 INJECTION, SOLUTION INTRAVENOUS at 09:12

## 2017-12-24 RX ADMIN — NITROGLYCERIN 1 INCH: 20 OINTMENT TOPICAL at 11:12

## 2017-12-24 RX ADMIN — PIPERACILLIN AND TAZOBACTAM 4.5 G: 4; .5 INJECTION, POWDER, FOR SOLUTION INTRAVENOUS at 09:12

## 2017-12-24 RX ADMIN — IPRATROPIUM BROMIDE AND ALBUTEROL SULFATE 3 ML: .5; 3 SOLUTION RESPIRATORY (INHALATION) at 09:12

## 2017-12-24 RX ADMIN — NITROGLYCERIN 1 INCH: 20 OINTMENT TOPICAL at 12:12

## 2017-12-24 RX ADMIN — PIPERACILLIN AND TAZOBACTAM 4.5 G: 4; .5 INJECTION, POWDER, FOR SOLUTION INTRAVENOUS at 11:12

## 2017-12-24 RX ADMIN — FUROSEMIDE 40 MG: 10 INJECTION, SOLUTION INTRAMUSCULAR; INTRAVENOUS at 09:12

## 2017-12-24 RX ADMIN — LEVOTHYROXINE SODIUM 100 MCG: 100 TABLET ORAL at 05:12

## 2017-12-24 NOTE — ASSESSMENT & PLAN NOTE
Pulmonary consult, appreciate recs  CTA 12/24, images and radiologist interpretation and I personally reviewed   Bilateral infiltrates consistent with PNA  Empiric abx: Zyvox and Zosyn  Methylpred 40mg q6h  Sputum pending collection for culture  blood cultures NGTD 12/19  Swallow eval

## 2017-12-24 NOTE — ASSESSMENT & PLAN NOTE
Cardiology consulted, appreciate recs  Trop now 3.316, trending down  Per Dr. Nolasco, Pt with atypical CP and (+) troponin. EKG is unremarkable and echo is normal. recommended continue IV diuresis, IV heparin x 48 hours, asa, plavix, statin, b blockers. Per cardiology, recommendations are for conservative therapy, lexiscan stress once diuresed.  Hep gtt, nitro  ointment  CTA completed, no PE

## 2017-12-24 NOTE — SUBJECTIVE & OBJECTIVE
Review of Systems   Constitutional: Positive for fatigue.   Eyes: Negative.    Respiratory: Positive for cough, chest tightness and shortness of breath.    Cardiovascular: Negative.    Gastrointestinal: Negative.    Endocrine: Negative.    Genitourinary: Negative.    Musculoskeletal: Negative.    Neurological: Negative.    Hematological: Negative.    Psychiatric/Behavioral: Negative.      Objective:     Vital Signs (Most Recent):  Temp: 97.6 °F (36.4 °C) (12/23/17 1505)  Pulse: 66 (12/23/17 1700)  Resp: 17 (12/23/17 1700)  BP: (!) 179/123 (12/23/17 1700)  SpO2: 97 % (12/23/17 1700) Vital Signs (24h Range):  Temp:  [96.3 °F (35.7 °C)-98 °F (36.7 °C)] 97.6 °F (36.4 °C)  Pulse:  [51-85] 66  Resp:  [15-31] 17  SpO2:  [88 %-98 %] 97 %  BP: (100-193)/() 179/123     Weight: 71 kg (156 lb 8.4 oz)  Body mass index is 26.87 kg/m².    Intake/Output Summary (Last 24 hours) at 12/23/17 2101  Last data filed at 12/23/17 1700   Gross per 24 hour   Intake            709.7 ml   Output             2630 ml   Net          -1920.3 ml      Physical Exam   Constitutional: She is oriented to person, place, and time. She appears well-developed and well-nourished.   HENT:   Head: Normocephalic and atraumatic.   Nose: Nose normal.   Mouth/Throat: Oropharynx is clear and moist. No oropharyngeal exudate.   Eyes: Conjunctivae and EOM are normal. Pupils are equal, round, and reactive to light. Left eye exhibits no discharge. No scleral icterus.   Neck: Normal range of motion. No JVD present. No tracheal deviation present. No thyromegaly present.   Cardiovascular: Normal rate, regular rhythm and normal heart sounds.    No murmur heard.  Pulmonary/Chest: Effort normal. She has wheezes. She has rales.   Abdominal: Soft. Bowel sounds are normal.   Genitourinary:   Genitourinary Comments: hawkins   Musculoskeletal: Normal range of motion. She exhibits no edema or deformity.   Neurological: She is alert and oriented to person, place, and time.    Skin: Skin is warm and dry. Capillary refill takes 2 to 3 seconds.   Psychiatric: She has a normal mood and affect.   Nursing note and vitals reviewed.      Significant Labs:   BMP:   Recent Labs  Lab 12/22/17 2140 12/23/17  0539   * 204*   * 136   K 3.7 3.3*   CL 98 101   CO2 20* 24   BUN 20 20   CREATININE 0.8 0.8   CALCIUM 8.5* 7.7*   MG 2.0  --      CBC:   Recent Labs  Lab 12/22/17 2140 12/23/17  0539   WBC 8.86 5.51   HGB 14.1 10.8*   HCT 42.1 33.1*    156     All pertinent labs within the past 24 hours have been reviewed.    Significant Imaging: I have reviewed and interpreted all pertinent imaging results/findings within the past 24 hours.

## 2017-12-24 NOTE — EICU
eICU Note :    Called by the [facility] eRN:    Problem:Chest pain in 89-year-old with vomiting due to Morphine     Pertinent History and labs reviewed :89-year-old female with past medical history significant for hypertension, hyperlipidemia, diabetes, patient presented with chest pain.  Positive troponins.  EKG unremarkable.  Echo was normal Patient has nitro paste on,Morphine makes her vomit      Treatment /Intervention given:Tylenol 650 every 6 when necessary pain        Rosemarie Linda MAXWELL  eICU Physician  10:01 PM  Patient on heparin drip.  PTT to be repeated in 2 hours as reported to be 'high'  2:33 AM  PTT >150 , Redraw in 2 hrs at 0400  5:02 AM  PTT now 98, heparin was has been off for 9 hours due to high readings, resume at 3unit lower dose which will be 13 units/kg/hr,  his  K +2.9, will go ahead and give 20 meq KCL x3 doses

## 2017-12-24 NOTE — SUBJECTIVE & OBJECTIVE
Interval History:   I have reviewed the patient's medical history in detail and updated the computerized patient record.    Examined at bedside    Procalcitonin 0.62 and Trop 3.316  Required BIPAP overnight , adequate diuresis  Cough rattles  Has Hx of dysphagia    Review of Systems   HENT: Negative.    Eyes: Negative.    Respiratory: Positive for cough and shortness of breath.    Cardiovascular: Negative.    Gastrointestinal: Negative.    Genitourinary: Negative.    Musculoskeletal: Negative.    Skin: Negative.    Neurological: Positive for weakness.   Endo/Heme/Allergies: Negative.    Psychiatric/Behavioral: Negative.        Objective:     Vital Signs (Most Recent):  Temp: 97.9 °F (36.6 °C) (12/24/17 0305)  Pulse: (!) 59 (12/24/17 0730)  Resp: 18 (12/24/17 0730)  BP: (!) 169/43 (12/24/17 0600)  SpO2: (!) 94 % (12/24/17 0730) Vital Signs (24h Range):  Temp:  [97.6 °F (36.4 °C)-98.5 °F (36.9 °C)] 97.9 °F (36.6 °C)  Pulse:  [52-73] 59  Resp:  [17-24] 18  SpO2:  [88 %-97 %] 94 %  BP: (128-194)/() 169/43     Weight: 71.3 kg (157 lb 3 oz)  Body mass index is 26.98 kg/m².      Intake/Output Summary (Last 24 hours) at 12/24/17 0857  Last data filed at 12/24/17 0600   Gross per 24 hour   Intake           432.57 ml   Output             2705 ml   Net         -2272.43 ml       Physical Exam   Constitutional: She is oriented to person, place, and time. She appears well-nourished.   HENT:   Head: Normocephalic and atraumatic.   Mouth/Throat: Oropharynx is clear and moist. No oropharyngeal exudate.   Eyes: EOM are normal. Pupils are equal, round, and reactive to light.   Neck: Normal range of motion. Neck supple.   Cardiovascular: Normal rate, regular rhythm and normal heart sounds.    Pulmonary/Chest: Effort normal and breath sounds normal.   Abdominal: Soft. Bowel sounds are normal.   Genitourinary:   Genitourinary Comments: Has hawkins   Musculoskeletal: Normal range of motion.   Neurological: She is alert and oriented to  person, place, and time.   Skin: Skin is warm and dry. Capillary refill takes 2 to 3 seconds.   Psychiatric: She has a normal mood and affect.   Nursing note and vitals reviewed.      Vents:  Oxygen Concentration (%): 50 (12/24/17 0730)    Lines/Drains/Airways     Drain                 Urethral Catheter 12/22/17 2230 Non-latex 16 Fr. 1 day          Peripheral Intravenous Line                 Peripheral IV - Single Lumen 12/22/17 Right Forearm 2 days         Peripheral IV - Single Lumen 12/22/17 2210 Left Forearm 1 day                Significant Labs:    CBC/Anemia Profile:    Recent Labs  Lab 12/22/17 2140 12/23/17  0539 12/24/17  0338   WBC 8.86 5.51 5.10   HGB 14.1 10.8* 11.7*   HCT 42.1 33.1* 35.8*    156 187   MCV 91 90 91   RDW 14.6* 14.4 14.4        Chemistries:    Recent Labs  Lab 12/22/17 2140 12/23/17  0539 12/24/17  0338 12/24/17  0400   * 136 139  --    K 3.7 3.3* 2.9*  --    CL 98 101 100  --    CO2 20* 24 26  --    BUN 20 20 18  --    CREATININE 0.8 0.8 0.8  --    CALCIUM 8.5* 7.7* 8.0*  --    ALBUMIN 3.0*  --   --   --    PROT 7.2  --   --   --    BILITOT 0.3  --   --   --    ALKPHOS 95  --   --   --    ALT 14  --   --   --    AST 34  --   --   --    MG 2.0  --   --  1.4*   PHOS 3.8  --   --   --        ABGs:   Recent Labs  Lab 12/23/17  1122   PH 7.403   PCO2 43.6   HCO3 27.2   POCSATURATED 90*   BE 2     Cardiac Markers: No results for input(s): CKMB, TROPONINT, MYOGLOBIN in the last 48 hours.  Lactic Acid:   Recent Labs  Lab 12/22/17 2140 12/23/17  0539   LACTATE 2.0 1.0     POCT Glucose:   Recent Labs  Lab 12/23/17  1722 12/23/17  2245 12/24/17  0603   POCTGLUCOSE 142* 137* 110     Respiratory Culture: No results for input(s): GSRESP, RESPIRATORYC in the last 48 hours.  Troponin:   Recent Labs  Lab 12/23/17  0808 12/23/17  1553 12/24/17  0338   TROPONINI 5.573* 5.148* 3.316*     Urine Culture:   Recent Labs  Lab 12/22/17  2200   LABURIN No growth     All pertinent labs within the  past 24 hours have been reviewed.    Significant Imaging:  I have reviewed and interpreted all pertinent imaging results/findings within the past 24 hours.

## 2017-12-24 NOTE — PLAN OF CARE
Problem: Patient Care Overview  Goal: Plan of Care Review  Outcome: Ongoing (interventions implemented as appropriate)  Patient remained on bipap last night. She has continued to deny chest pain since the episode earlier tonight in which she got anxious and stated she was hurting.  Tylenol given per patient request and declining morphine for pain relief.  Heparin infusion was turned off at 2115 last night. Twice PTT was unreadable.  This mornings labs showed ptt to be 98.4.  Spoke with Dr Curry and she requested to restart infusion per nomogram 3 units less than previous rate and follow nomogram for recheck of ptt.  See MAR for medication doses and times of administration.

## 2017-12-24 NOTE — ASSESSMENT & PLAN NOTE
Pulmonary consult, appreciate recs  Bilateral infiltrates appear chronic per pulm though old films not available  More likely volume overload  Empiric abx: Moxifloxacin   Sputum and blood cultures pending  Swallow eval  Procalcitonin

## 2017-12-24 NOTE — ASSESSMENT & PLAN NOTE
Pulmonary consult, appreciate recs  CTA 12/24, images and radiologist interpretation personally reviewed   Bilateral infiltrates consistent with PNA  Empiric abx: Moxifloxacin   Methylpred 40mg q6h  Sputum pending collection for culture  blood cultures NGTD 12/19  Swallow eval

## 2017-12-24 NOTE — PROGRESS NOTES
"[facility] Hospital Medicine  Progress Note    Patient Name: Olivia Reyes  MRN: [id number]  Patient Class: IP- Inpatient   Admission Date: 12/22/2017  Length of Stay: 2 days  Attending Physician: Polly Garcia MD  Primary Care Provider: No primary care provider on file.        Subjective:     Principal Problem:Acute respiratory failure with hypoxia and hypercarbia    HPI:  Olivia Reyes is a 89 y.o. female patient who presented to the Emergency Department for SOB. Onset gradually 1 week ago. Symptoms constant and worsening. Patient reports over past week that she has been noticing increaed SOB with excursion. Prior treatment neb, evaluation at [facility] after hours, and 1 doxycycline. The  states that the pt "deteriorated 3 hours later", which prompted the ED visit.  No mitigating or exacerbating factors reported. Associated sxs include chest pain and "low grade" fever (99.5). En route, pt was placed on CPAP by EMS.  denies any diaphoresis, dizziness, focal weakness/ numbness, cough, palpitations, leg swelling, n/v/d, and all other sxs at this time. No further complaints or concerns at this time. The patient was evaluated in the Er, CBC neg, CMP notable for hypergycemia and CO2 20. Troponin and BNP elevated. UA neg. ABGs pH7.315, CO2 52.3, PO2 70. Chest Xray: Patchy infiltrates scattered about the left lung.  Question of a mild infiltrate right lung base.  Heart size within normal limits.  Vascular calcification of aorta.  No significant bony findings. The patient was placed on Bipap in ER with improvement in symptoms per patient. The patient admitted to ICU with Bipap for Acute Resp Failure with hypoxemia and hypercap.     Hospital Course:  89 y/u female with PMHx with PMhx for HTN, HLP, DM presents with CHF and possible pneumonia. Troponin trended up and cardiology consulted.   Per Dr. Nolasco, patient with atypical CP and (+) troponin. EKG is unremarkable and echo is normal. " Cardiology recommended continue IV diuresis, IV heparin x 48 hours, asa, plavix, statin, b blockers. Recommendations are for conservative therapy, lexiscan stress once diuresed.  12/24 - CTA today revealed small to moderate bilateral pleural effusions. Extensive bilateral infiltrates/pneumonia. Chronic lung changes. Negative for acute thoracic aortic aneurysm or dissection. Negative for acute pulmonary emboli. On BiPap. Moxi Iv. Acetylcysteine nebs. Hypomagnesemia and hypokalemia. Replacing. BP uncontrolled, increasing amlodipine from 5 mg to 10mg daily. Patient continues on lasix 40mg IV BID with I/O -2472 over last 24 hours. Troponin peaked and now downtrending. Remains on heparin gtt with nitro ointment        Review of Systems   Unable to perform ROS: Severe respiratory distress   Constitutional: Positive for fatigue. Negative for chills and fever.   Respiratory: Positive for cough, chest tightness and shortness of breath.    Cardiovascular: Negative for chest pain, palpitations and leg swelling.     Objective:     Vital Signs (Most Recent):  Temp: 97.9 °F (36.6 °C) (12/24/17 0305)  Pulse: 78 (12/24/17 0922)  Resp: (!) 23 (12/24/17 0922)  BP: (!) 169/43 (12/24/17 0600)  SpO2: 95 % (12/24/17 0922) Vital Signs (24h Range):  Temp:  [97.6 °F (36.4 °C)-98.5 °F (36.9 °C)] 97.9 °F (36.6 °C)  Pulse:  [52-78] 78  Resp:  [17-24] 23  SpO2:  [89 %-97 %] 95 %  BP: (128-194)/() 169/43     Weight: 71.3 kg (157 lb 3 oz)  Body mass index is 26.98 kg/m².    Intake/Output Summary (Last 24 hours) at 12/24/17 1014  Last data filed at 12/24/17 0600   Gross per 24 hour   Intake           382.87 ml   Output             2245 ml   Net         -1862.13 ml      Physical Exam   Constitutional: She appears well-nourished.   HENT:   Head: Normocephalic and atraumatic.   Eyes: EOM are normal.   Neck: Normal range of motion. Neck supple.   Cardiovascular: Normal rate, regular rhythm and normal heart sounds.    Pulmonary/Chest: She is in  respiratory distress. She has rales.   Abdominal: Soft. Bowel sounds are normal.   Genitourinary:   Genitourinary Comments: Has hawkins   Musculoskeletal: Normal range of motion.   Neurological: She is alert.   Skin: Skin is warm and dry. Capillary refill takes 2 to 3 seconds.   Psychiatric: She has a normal mood and affect.   Nursing note and vitals reviewed.      Significant Labs:   Blood Culture:   Recent Labs  Lab 12/22/17  2210   LABBLOO No Growth to date  No Growth to date  No Growth to date  No Growth to date     BMP:   Recent Labs  Lab 12/24/17  0338 12/24/17  0400     --      --    K 2.9*  --      --    CO2 26  --    BUN 18  --    CREATININE 0.8  --    CALCIUM 8.0*  --    MG  --  1.4*     CBC:   Recent Labs  Lab 12/22/17  2140 12/23/17  0539 12/24/17  0338   WBC 8.86 5.51 5.10   HGB 14.1 10.8* 11.7*   HCT 42.1 33.1* 35.8*    156 187     Respiratory Culture: No results for input(s): GSRESP, RESPIRATORYC in the last 48 hours.    Significant Imaging: I have reviewed and interpreted all pertinent imaging results/findings within the past 24 hours.     Imaging Results          CTA Chest Non Coronary (Final result)  Result time 12/24/17 09:55:12    Final result by Hiram Metzger III, MD (12/24/17 09:55:12)                 Narrative:    CT angiogram of the chest.    Clinical indication: Hypoxemia. Sarcoidosis.    Standard and MIPS/3-D images submitted.    There are bilateral pleural effusions of a small to slightly moderate degree. There are small mediastinal nodes throughout of indeterminate significance and etiology. There is opacification of the thoracic aorta without evidence of aneurysm formation or dissection. There is opacification of the pulmonary arterial system. There no gross filling defects within the major branches.    There are chronic lung changes bilaterally. Extensive groundglass infiltrates are noted bilaterally scattered throughout both lungs with partial  consolidation posteriorly at the lung bases. There is also partial consolidation along the perihilar cysts or perihilar zones, especially on the right anteromedially involving the upper lobe.    The upper abdomen shows no acute abnormality.    1. Bilateral pleural effusions. Extensive bilateral infiltrates/pneumonia. Chronic lung changes.    2. Negative for acute thoracic aortic aneurysm or dissection. Negative for acute pulmonary emboli.          Electronically signed by: CARLOS DAVID MD  Date:     12/24/17  Time:    09:55                              X-Ray Chest 1 View (Final result)  Result time 12/23/17 08:55:16    Final result by Nick Merritt MD (12/23/17 08:55:16)                 Impression:     No significant change. Patchy bilateral infiltrates, left greater than right.      Electronically signed by: NICK MERRITT MD  Date:     12/23/17  Time:    08:55              Narrative:    History: Shortness of breath    Normal heart size. Patchy infiltrates throughout left lung appear unchanged from prior day. There is coarsening of bronchovascular markings bilaterally. Mild patchy infiltrates in the right mid and lower lung.                             X-Ray Chest AP Portable (Final result)  Result time 12/22/17 22:21:09    Final result by Nick Reed MD (12/22/17 22:21:09)                 Impression:         Patchy infiltrates throughout the left lung.      Electronically signed by: NICK REED MD  Date:     12/22/17  Time:    22:21              Narrative:    Exam: XR CHEST AP PORTABLE,    Date:  12/22/17 22:12:48    History: Sepsis.    Comparison:  No prior  studies available for comparison.    Findings:     Patchy infiltrates scattered about the left lung.  Question of a mild infiltrate right lung base.  Heart size within normal limits.  Vascular calcification of aorta.  No significant bony findings.                                Assessment/Plan:      * Acute respiratory failure  with hypoxia and hypercarbia    Keep sats> 92%  NIPPV prn  Bronchodilators  Accapella  Solumedrol                Diabetes mellitus type 2, uncontrolled    Moderate sliding scale   Detemir 10 QHS (home is 21 QHS)  a1c 7.1  Monitor  Diabetic diet           Acute CHF    Appreciate recs from ICU/pulm/cardiology  HFpEF exacerbation  ECHO reviewed  Responding with diuresis  Preload and afterload reduction  Lisinopril 40 mg          Elevated troponin    Cardiology consulted, appreciate recs  Trop now 3.316, trending down  Per Dr. Nolasco, Pt with atypical CP and (+) troponin. EKG is unremarkable and echo is normal. recommended continue IV diuresis, IV heparin x 48 hours, asa, plavix, statin, b blockers. Per cardiology, recommendations are for conservative therapy, lexiscan stress once diuresed.  Hep gtt, nitro  ointment  CTA completed, no PE        Pneumonia of left lower lobe due to infectious organism    Pulmonary consult, appreciate recs  CTA 12/24, images and radiologist interpretation personally reviewed   Bilateral infiltrates consistent with PNA  Empiric abx: Moxifloxacin   Methylpred 40mg q6h  Sputum pending collection for culture  blood cultures NGTD 12/19  Swallow eval                  VTE Risk Mitigation         Ordered     heparin 25,000 units in dextrose 5% 250 mL (100 units/mL) infusion; FEMALE  Continuous     Route:  Intravenous        12/23/17 0743     heparin 25,000 units in dextrose 5% 250 mL (100 units/mL) bolus from bag; PRN BOLUS  As needed (PRN)     Route:  Intravenous        12/23/17 0743     heparin 25,000 units in dextrose 5% 250 mL (100 units/mL) bolus from bag; PRN BOLUS  As needed (PRN)     Route:  Intravenous        12/23/17 0743     Medium Risk of VTE  Once      12/22/17 2321     Place sequential compression device  Until discontinued      12/22/17 2321          Critical care time spent on the evaluation and treatment of severe organ dysfunction, review of pertinent labs and imaging studies,  discussions with consulting providers and discussions with patient/family: 62 minutes.    Polly Garcia MD  Department of Hospital Medicine   [facility]

## 2017-12-24 NOTE — ASSESSMENT & PLAN NOTE
Moderate sliding scale   Detemir increased to 15U QHS (home is 21 QHS)  a1c 7.1  Monitor   Diabetic diet

## 2017-12-24 NOTE — ASSESSMENT & PLAN NOTE
Bilateral infiltrates appear chronic without privilege of old films  CXR from urgent care reviewed  [place] of opacities  more likely volume overload  Empiric abx: Moxifloxacin   Sputum and blood cultures pending  Swallow eval

## 2017-12-24 NOTE — ASSESSMENT & PLAN NOTE
Keep sats> 92%  Repeat ABG, pCO2 now 43.   She needed   NIPPV  Bronchodilators  Accapella  CTA chest to look for VTE  Repeat ABG  Added solumedrol IV

## 2017-12-24 NOTE — ASSESSMENT & PLAN NOTE
Cardiology consulted, appreciate recs  Per Dr. Nolasco, Pt with atypical CP and (+) troponin. EKG is unremarkable and echo is normal. recommended continue IV diuresis, IV heparin x 48 hours, asa, plavix, statin, b blockers. Per cardiology, recommendations are for conservative therapy, lexiscan stress once diuresed.  CTA completed, no PE

## 2017-12-24 NOTE — PROGRESS NOTES
Patient had an episode of anxiety with SOB and stating she has had chest pain.  Pulled her bipap mask off. Nitro paste in place.  Contacted EICU for additional orders.  Patient refused morphine and asked for only tylenol for the pain.  Bipap mask placed back on patient, patient states she is feeling better now.  Will continue to monitor during the night.

## 2017-12-24 NOTE — ASSESSMENT & PLAN NOTE
Appreciate recs from ICU/pulm/cardiology  HFpEF exacerbation  ECHO reviewed  Responding with diuresis  Preload and afterload reduction  Lisinopril 40 mg

## 2017-12-24 NOTE — ASSESSMENT & PLAN NOTE
Appreciate recs from ICU/pulm/cardiology  Possible HFpEF exacerbation  ECHO reviewed  Responded with diuretics  PRELOAD AND AFTERLOAD reduction  Lisinopril 40 mg

## 2017-12-24 NOTE — PLAN OF CARE
Problem: Patient Care Overview  Goal: Plan of Care Review  Outcome: Ongoing (interventions implemented as appropriate)  Pt more subdued today.  Remains on Bipap.  Lungs sounds are very course.  Plan of care reviewed w pt and son.

## 2017-12-24 NOTE — PROGRESS NOTES
[facility]   Cardiology  Progress Note    Patient Name: Olivia Reyes  MRN: [id number]  Admission Date: 12/22/2017  Hospital Length of Stay: 2 days  Code Status: DNR   Attending Physician: Polly Garcia MD   Primary Care Physician: No primary care provider on file.  Expected Discharge Date:   Principal Problem:Acute respiratory failure with hypoxia and hypercarbia    Subjective:     Hospital Course:   Diuresing well, still in CHF this morning    Interval History: Pt with CHF    Review of Systems   Constitution: Negative. Negative for weight gain.   HENT: Negative.    Eyes: Negative.    Cardiovascular: Negative.  Negative for chest pain, leg swelling and palpitations.   Respiratory: Negative.  Negative for shortness of breath.    Endocrine: Negative.    Hematologic/Lymphatic: Negative.    Skin: Negative.    Musculoskeletal: Negative for muscle weakness.   Gastrointestinal: Negative.    Genitourinary: Negative.    Neurological: Negative.  Negative for dizziness.   Psychiatric/Behavioral: Negative.    Allergic/Immunologic: Negative.      Objective:     Vital Signs (Most Recent):  Temp: 99.1 °F (37.3 °C) (12/24/17 0705)  Pulse: 67 (12/24/17 1055)  Resp: (!) 25 (12/24/17 1055)  BP: (!) 176/69 (12/24/17 0915)  SpO2: 99 % (12/24/17 1055) Vital Signs (24h Range):  Temp:  [97.6 °F (36.4 °C)-99.1 °F (37.3 °C)] 99.1 °F (37.3 °C)  Pulse:  [52-79] 67  Resp:  [17-25] 25  SpO2:  [89 %-99 %] 99 %  BP: (128-194)/() 176/69     Weight: 71.3 kg (157 lb 3 oz)  Body mass index is 26.98 kg/m².     SpO2: 99 %  O2 Device (Oxygen Therapy): BiPAP (avaps)      Intake/Output Summary (Last 24 hours) at 12/24/17 1143  Last data filed at 12/24/17 1000   Gross per 24 hour   Intake           482.87 ml   Output             2235 ml   Net         -1752.13 ml       Lines/Drains/Airways     Drain                 Urethral Catheter 12/22/17 2230 Non-latex 16 Fr. 1 day          Peripheral Intravenous Line                  Peripheral IV - Single Lumen 12/22/17 Right Forearm 2 days         Peripheral IV - Single Lumen 12/22/17 2210 Left Forearm 1 day                Physical Exam   Constitutional: She is oriented to person, place, and time. She appears well-developed and well-nourished.   HENT:   Head: Normocephalic and atraumatic.   Eyes: Conjunctivae and EOM are normal. Pupils are equal, round, and reactive to light.   Neck: Normal range of motion. Neck supple.   Cardiovascular: Normal rate, regular rhythm, normal heart sounds and intact distal pulses.    Pulmonary/Chest: Effort normal. She has rales.   Abdominal: Soft. Bowel sounds are normal.   Musculoskeletal: Normal range of motion.   Neurological: She is alert and oriented to person, place, and time.   Skin: Skin is warm and dry.   Psychiatric: She has a normal mood and affect.   Nursing note and vitals reviewed.      Significant Labs: All pertinent lab results from the last 24 hours have been reviewed.    Significant Imaging: X-Ray: CXR: X-Ray Chest 1 View (CXR):   Results for orders placed or performed during the hospital encounter of 12/22/17   X-Ray Chest 1 View    Narrative    History: Shortness of breath    Normal heart size. Patchy infiltrates throughout left lung appear unchanged from prior day. There is coarsening of bronchovascular markings bilaterally. Mild patchy infiltrates in the right mid and lower lung.    Impression     No significant change. Patchy bilateral infiltrates, left greater than right.      Electronically signed by: IRIS MERRTIT MD  Date:     12/23/17  Time:    08:55      Assessment and Plan:     Brief HPI: CHF    * Acute respiratory failure with hypoxia and hypercarbia    Continue diuretics, IV heparin x 24 hours more,asa,plavix, nitrates            VTE Risk Mitigation         Ordered     heparin 25,000 units in dextrose 5% 250 mL (100 units/mL) infusion; FEMALE  Continuous     Route:  Intravenous        12/23/17 0743     heparin 25,000 units in  dextrose 5% 250 mL (100 units/mL) bolus from bag; PRN BOLUS  As needed (PRN)     Route:  Intravenous        12/23/17 0743     heparin 25,000 units in dextrose 5% 250 mL (100 units/mL) bolus from bag; PRN BOLUS  As needed (PRN)     Route:  Intravenous        12/23/17 0743     Medium Risk of VTE  Once      12/22/17 2321     Place sequential compression device  Until discontinued      12/22/17 2321          Nathan Nolasco MD  Cardiology  [facility]

## 2017-12-24 NOTE — SUBJECTIVE & OBJECTIVE
Interval History: Pt with CHF    Review of Systems   Constitution: Negative. Negative for weight gain.   HENT: Negative.    Eyes: Negative.    Cardiovascular: Negative.  Negative for chest pain, leg swelling and palpitations.   Respiratory: Negative.  Negative for shortness of breath.    Endocrine: Negative.    Hematologic/Lymphatic: Negative.    Skin: Negative.    Musculoskeletal: Negative for muscle weakness.   Gastrointestinal: Negative.    Genitourinary: Negative.    Neurological: Negative.  Negative for dizziness.   Psychiatric/Behavioral: Negative.    Allergic/Immunologic: Negative.      Objective:     Vital Signs (Most Recent):  Temp: 99.1 °F (37.3 °C) (12/24/17 0705)  Pulse: 67 (12/24/17 1055)  Resp: (!) 25 (12/24/17 1055)  BP: (!) 176/69 (12/24/17 0915)  SpO2: 99 % (12/24/17 1055) Vital Signs (24h Range):  Temp:  [97.6 °F (36.4 °C)-99.1 °F (37.3 °C)] 99.1 °F (37.3 °C)  Pulse:  [52-79] 67  Resp:  [17-25] 25  SpO2:  [89 %-99 %] 99 %  BP: (128-194)/() 176/69     Weight: 71.3 kg (157 lb 3 oz)  Body mass index is 26.98 kg/m².     SpO2: 99 %  O2 Device (Oxygen Therapy): BiPAP (avaps)      Intake/Output Summary (Last 24 hours) at 12/24/17 1143  Last data filed at 12/24/17 1000   Gross per 24 hour   Intake           482.87 ml   Output             2235 ml   Net         -1752.13 ml       Lines/Drains/Airways     Drain                 Urethral Catheter 12/22/17 2230 Non-latex 16 Fr. 1 day          Peripheral Intravenous Line                 Peripheral IV - Single Lumen 12/22/17 Right Forearm 2 days         Peripheral IV - Single Lumen 12/22/17 2210 Left Forearm 1 day                Physical Exam   Constitutional: She is oriented to person, place, and time. She appears well-developed and well-nourished.   HENT:   Head: Normocephalic and atraumatic.   Eyes: Conjunctivae and EOM are normal. Pupils are equal, round, and reactive to light.   Neck: Normal range of motion. Neck supple.   Cardiovascular: Normal rate,  regular rhythm, normal heart sounds and intact distal pulses.    Pulmonary/Chest: Effort normal. She has rales.   Abdominal: Soft. Bowel sounds are normal.   Musculoskeletal: Normal range of motion.   Neurological: She is alert and oriented to person, place, and time.   Skin: Skin is warm and dry.   Psychiatric: She has a normal mood and affect.   Nursing note and vitals reviewed.      Significant Labs: All pertinent lab results from the last 24 hours have been reviewed.    Significant Imaging: X-Ray: CXR: X-Ray Chest 1 View (CXR):   Results for orders placed or performed during the hospital encounter of 12/22/17   X-Ray Chest 1 View    Narrative    History: Shortness of breath    Normal heart size. Patchy infiltrates throughout left lung appear unchanged from prior day. There is coarsening of bronchovascular markings bilaterally. Mild patchy infiltrates in the right mid and lower lung.    Impression     No significant change. Patchy bilateral infiltrates, left greater than right.      Electronically signed by: IRIS MERRITT MD  Date:     12/23/17  Time:    08:55

## 2017-12-24 NOTE — HOSPITAL COURSE
12/26/17-Patient seen and examined today, resting in bed. States SOB is improving. No chest pain. Troponin trending down. Creatine stable, K slightly low at 3.1. EKG changes have resolved.     12/27/17-Patient seen and examined today, resting in bed. Reports diarrhea. SOB improving, remains on Vapotherm. Labs reviewed. K low at 3.0. Creatine stable.     12/28/17-Patient seen and examined today, lying in bed. Feels weak and fatigued. Also complains of pleuritic chest discomfort, worse with coughing and deep breathing. Labs reviewed. Troponin trending down. CXR and EKG stable.

## 2017-12-24 NOTE — PROGRESS NOTES
[facility] -   Critical Care Medicine  Progress Note    Patient Name: Olivia Reyes  MRN: [id number]  Admission Date: 12/22/2017  Hospital Length of Stay: 2 days  Code Status: DNR  Attending Provider: Polly Garcia MD  Primary Care Provider: No primary care provider on file.   Principal Problem: Acute respiratory failure with hypoxia and hypercarbia    Subjective:     HPI:  Ms Reyes is 89 year old female admitted to MICU for SOB  Hx obtained from Son.  Recent SOB, chest pressure and congestion, seen [facility] after hours, CXR reviewed, given Neb and Doxycycline  Declined at home with respiratory distress, and EMS called for WOB  Low grade fever, chest heaviness and Elevated BP SBP >190   ABGs pH7.315, CO2 52.3, PO2 70. Chest Xray: Patchy infiltrates scattered about the left lung.    The patient was placed on Bipap in ER with improvement in symptoms per patient.   No Hox of COPD   Old Dx of Sarcoidosis: lives in [place]  Is DNR per son     Hospital/ICU Course:  Treated with lasix  On Nasal canula  Heparin GTT started  EKG reviewed    12/24: Procalcitonin 0.62 and Trop 3.316  Required BIPAP overnight , adequate diuresis  Cough rattles    Interval History:   I have reviewed the patient's medical history in detail and updated the computerized patient record.    Examined at bedside    Procalcitonin 0.62 and Trop 3.316  Required BIPAP overnight , adequate diuresis  Cough rattles  Has Hx of dysphagia    Review of Systems   HENT: Negative.    Eyes: Negative.    Respiratory: Positive for cough and shortness of breath.    Cardiovascular: Negative.    Gastrointestinal: Negative.    Genitourinary: Negative.    Musculoskeletal: Negative.    Skin: Negative.    Neurological: Positive for weakness.   Endo/Heme/Allergies: Negative.    Psychiatric/Behavioral: Negative.        Objective:     Vital Signs (Most Recent):  Temp: 97.9 °F (36.6 °C) (12/24/17 0305)  Pulse: (!) 59 (12/24/17 0730)  Resp: 18 (12/24/17  0730)  BP: (!) 169/43 (12/24/17 0600)  SpO2: (!) 94 % (12/24/17 0730) Vital Signs (24h Range):  Temp:  [97.6 °F (36.4 °C)-98.5 °F (36.9 °C)] 97.9 °F (36.6 °C)  Pulse:  [52-73] 59  Resp:  [17-24] 18  SpO2:  [88 %-97 %] 94 %  BP: (128-194)/() 169/43     Weight: 71.3 kg (157 lb 3 oz)  Body mass index is 26.98 kg/m².      Intake/Output Summary (Last 24 hours) at 12/24/17 0857  Last data filed at 12/24/17 0600   Gross per 24 hour   Intake           432.57 ml   Output             2705 ml   Net         -2272.43 ml       Physical Exam   Constitutional: She is oriented to person, place, and time. She appears well-nourished.   HENT:   Head: Normocephalic and atraumatic.   Mouth/Throat: Oropharynx is clear and moist. No oropharyngeal exudate.   Eyes: EOM are normal. Pupils are equal, round, and reactive to light.   Neck: Normal range of motion. Neck supple.   Cardiovascular: Normal rate, regular rhythm and normal heart sounds.    Pulmonary/Chest: Effort normal and breath sounds normal.   Abdominal: Soft. Bowel sounds are normal.   Genitourinary:   Genitourinary Comments: Has hawkins   Musculoskeletal: Normal range of motion.   Neurological: She is alert and oriented to person, place, and time.   Skin: Skin is warm and dry. Capillary refill takes 2 to 3 seconds.   Psychiatric: She has a normal mood and affect.   Nursing note and vitals reviewed.      Vents:  Oxygen Concentration (%): 50 (12/24/17 0730)    Lines/Drains/Airways     Drain                 Urethral Catheter 12/22/17 2230 Non-latex 16 Fr. 1 day          Peripheral Intravenous Line                 Peripheral IV - Single Lumen 12/22/17 Right Forearm 2 days         Peripheral IV - Single Lumen 12/22/17 2210 Left Forearm 1 day                Significant Labs:    CBC/Anemia Profile:    Recent Labs  Lab 12/22/17  2140 12/23/17  0539 12/24/17  0338   WBC 8.86 5.51 5.10   HGB 14.1 10.8* 11.7*   HCT 42.1 33.1* 35.8*    156 187   MCV 91 90 91   RDW 14.6* 14.4 14.4         Chemistries:    Recent Labs  Lab 12/22/17  2140 12/23/17  0539 12/24/17  0338 12/24/17  0400   * 136 139  --    K 3.7 3.3* 2.9*  --    CL 98 101 100  --    CO2 20* 24 26  --    BUN 20 20 18  --    CREATININE 0.8 0.8 0.8  --    CALCIUM 8.5* 7.7* 8.0*  --    ALBUMIN 3.0*  --   --   --    PROT 7.2  --   --   --    BILITOT 0.3  --   --   --    ALKPHOS 95  --   --   --    ALT 14  --   --   --    AST 34  --   --   --    MG 2.0  --   --  1.4*   PHOS 3.8  --   --   --        ABGs:   Recent Labs  Lab 12/23/17  1122   PH 7.403   PCO2 43.6   HCO3 27.2   POCSATURATED 90*   BE 2     Cardiac Markers: No results for input(s): CKMB, TROPONINT, MYOGLOBIN in the last 48 hours.  Lactic Acid:   Recent Labs  Lab 12/22/17  2140 12/23/17  0539   LACTATE 2.0 1.0     POCT Glucose:   Recent Labs  Lab 12/23/17  1722 12/23/17  2245 12/24/17  0603   POCTGLUCOSE 142* 137* 110     Respiratory Culture: No results for input(s): GSRESP, RESPIRATORYC in the last 48 hours.  Troponin:   Recent Labs  Lab 12/23/17  0808 12/23/17  1553 12/24/17  0338   TROPONINI 5.573* 5.148* 3.316*     Urine Culture:   Recent Labs  Lab 12/22/17  2200   LABURIN No growth     All pertinent labs within the past 24 hours have been reviewed.    Significant Imaging:  I have reviewed and interpreted all pertinent imaging results/findings within the past 24 hours.      Assessment/Plan:     Neuro    At baseline  PO MEMANTINE  PO SERTRALINE        ENT    Hearing aid        Pulmonary   * Acute respiratory failure with hypoxia and hypercarbia    Keep sats> 92%  Repeat ABG, pCO2 now 43.   She needed   NIPPV  Bronchodilators  Accapella  CTA chest to look for VTE  Repeat ABG  Added solumedrol IV          Pneumonia of left lower lobe due to infectious organism    Bilateral infiltrates appear chronic without privilege of old films  CXR from urgent care reviewed  [place] of opacities  more likely volume overload  Empiric abx: Moxifloxacin   Sputum and blood cultures  pending  Swallow eval        Cardiac/Vascular    Aspirin  Simvastatin        Acute CHF    Possible HFpEF  Responded with diuretics  PRELOAD AND AFTERLOAD reduction  Lisinopril 40 mg          Elevated troponin      NTG paste  Oxygen  PRN Morphine: had no chest pain  Troponin peaked and trending down        Renal/    Strict I/O  BID lasix  Replace magnesium        ID    Culture data pending  Moxifloxacin        Hematology    Heparin GTT @ 10 units per hr,   aptt EARLIER WAS SUPRATHERAPUETIC        Endocrine   Diabetes mellitus type 2, uncontrolled    Levothyroxine 100 mcg PO  Moderate sliding scale Detemir insulin 6 units BID          GI    Pantoprazole EC          Chest PT RML  Added Zosyn  Added Hydralazine and Enalapril for BP control  Induced sputum  mucolytics  No pulmonary embolus  Bilateral effusions     Critical Care Daily Checklist:    A: Awake: RASS Goal/Actual Goal: RASS Goal: 0-->alert and calm  Actual:     B: Spontaneous Breathing Trial Performed?     C: SAT & SBT Coordinated?  N/A                      D: Delirium: CAM-ICU Overall CAM-ICU: Negative   E: Early Mobility Performed? Yes   F: Feeding Goal:    Status:     Current Diet Order   Procedures    Diet NPO Except for: Medication     Order Specific Question:   Except for     Answer:   Medication      AS: Analgesia/Sedation NONE   T: Thromboembolic Prophylaxis HEPARIN   H: HOB > 300 Yes   U: Stress Ulcer Prophylaxis (if needed) FAMOTIDINE   G: Glucose Control 103 to 110   B: Bowel Function     I: Indwelling Catheter (Lines & Macias) Necessity Macias and peripheral IV   D: De-escalation of Antimicrobials/Pharmacotherapies IVPB MOXIFLOXACIN    Plan for the day/ETD Wean oxygen    Code Status:  Family/Goals of Care: DNR  HOME     Critical Care Time: 45 minutes  Critical secondary to  Hypoxemia, respiratory failure, required BIPAP    Discussed diagnosis, its evaluation, treatment and usual course. All questions answered.       Critical care was time spent  personally by me on the following activities: development of treatment plan with patient or surrogate and bedside caregivers, discussions with consultants, evaluation of patient's response to treatment, examination of patient, ordering and performing treatments and interventions, ordering and review of laboratory studies, ordering and review of radiographic studies, pulse oximetry, re-evaluation of patient's condition. This critical care time did not overlap with that of any other provider or involve time for any procedures.     Braxton Bonilla MD  Critical Care Medicine  [facility] [place]

## 2017-12-24 NOTE — PLAN OF CARE
Problem: Patient Care Overview  Goal: Plan of Care Review  Outcome: Ongoing (interventions implemented as appropriate)  Patient continues to be on mechanical ventilation. Weaning throughout this shift as tolerated per ordered parameters.

## 2017-12-24 NOTE — SUBJECTIVE & OBJECTIVE
Review of Systems   Unable to perform ROS: Severe respiratory distress   Constitutional: Positive for fatigue. Negative for chills and fever.   Respiratory: Positive for cough, chest tightness and shortness of breath.    Cardiovascular: Negative for chest pain, palpitations and leg swelling.     Objective:     Vital Signs (Most Recent):  Temp: 97.9 °F (36.6 °C) (12/24/17 0305)  Pulse: 78 (12/24/17 0922)  Resp: (!) 23 (12/24/17 0922)  BP: (!) 169/43 (12/24/17 0600)  SpO2: 95 % (12/24/17 0922) Vital Signs (24h Range):  Temp:  [97.6 °F (36.4 °C)-98.5 °F (36.9 °C)] 97.9 °F (36.6 °C)  Pulse:  [52-78] 78  Resp:  [17-24] 23  SpO2:  [89 %-97 %] 95 %  BP: (128-194)/() 169/43     Weight: 71.3 kg (157 lb 3 oz)  Body mass index is 26.98 kg/m².    Intake/Output Summary (Last 24 hours) at 12/24/17 1014  Last data filed at 12/24/17 0600   Gross per 24 hour   Intake           382.87 ml   Output             2245 ml   Net         -1862.13 ml      Physical Exam   Constitutional: She appears well-nourished.   HENT:   Head: Normocephalic and atraumatic.   Eyes: EOM are normal.   Neck: Normal range of motion. Neck supple.   Cardiovascular: Normal rate, regular rhythm and normal heart sounds.    Pulmonary/Chest: She is in respiratory distress. She has rales.   Abdominal: Soft. Bowel sounds are normal.   Genitourinary:   Genitourinary Comments: Has hawkins   Musculoskeletal: Normal range of motion.   Neurological: She is alert.   Skin: Skin is warm and dry. Capillary refill takes 2 to 3 seconds.   Psychiatric: She has a normal mood and affect.   Nursing note and vitals reviewed.      Significant Labs:   Blood Culture:   Recent Labs  Lab 12/22/17  2210   LABBLOO No Growth to date  No Growth to date  No Growth to date  No Growth to date     BMP:   Recent Labs  Lab 12/24/17  0338 12/24/17  0400     --      --    K 2.9*  --      --    CO2 26  --    BUN 18  --    CREATININE 0.8  --    CALCIUM 8.0*  --    MG  --   1.4*     CBC:   Recent Labs  Lab 12/22/17  2140 12/23/17  0539 12/24/17  0338   WBC 8.86 5.51 5.10   HGB 14.1 10.8* 11.7*   HCT 42.1 33.1* 35.8*    156 187     Respiratory Culture: No results for input(s): GSRESP, RESPIRATORYC in the last 48 hours.    Significant Imaging: I have reviewed and interpreted all pertinent imaging results/findings within the past 24 hours.     Imaging Results          CTA Chest Non Coronary (Final result)  Result time 12/24/17 09:55:12    Final result by Hiram Metzger III, MD (12/24/17 09:55:12)                 Narrative:    CT angiogram of the chest.    Clinical indication: Hypoxemia. Sarcoidosis.    Standard and MIPS/3-D images submitted.    There are bilateral pleural effusions of a small to slightly moderate degree. There are small mediastinal nodes throughout of indeterminate significance and etiology. There is opacification of the thoracic aorta without evidence of aneurysm formation or dissection. There is opacification of the pulmonary arterial system. There no gross filling defects within the major branches.    There are chronic lung changes bilaterally. Extensive groundglass infiltrates are noted bilaterally scattered throughout both lungs with partial consolidation posteriorly at the lung bases. There is also partial consolidation along the perihilar cysts or perihilar zones, especially on the right anteromedially involving the upper lobe.    The upper abdomen shows no acute abnormality.    1. Bilateral pleural effusions. Extensive bilateral infiltrates/pneumonia. Chronic lung changes.    2. Negative for acute thoracic aortic aneurysm or dissection. Negative for acute pulmonary emboli.          Electronically signed by: HIRAM METZGER MD  Date:     12/24/17  Time:    09:55                              X-Ray Chest 1 View (Final result)  Result time 12/23/17 08:55:16    Final result by Nick Wilson MD (12/23/17 08:55:16)                 Impression:     No  significant change. Patchy bilateral infiltrates, left greater than right.      Electronically signed by: NICK MERRITT MD  Date:     12/23/17  Time:    08:55              Narrative:    History: Shortness of breath    Normal heart size. Patchy infiltrates throughout left lung appear unchanged from prior day. There is coarsening of bronchovascular markings bilaterally. Mild patchy infiltrates in the right mid and lower lung.                             X-Ray Chest AP Portable (Final result)  Result time 12/22/17 22:21:09    Final result by Nick Francisco MD (12/22/17 22:21:09)                 Impression:         Patchy infiltrates throughout the left lung.      Electronically signed by: NICK FRANCISCO MD  Date:     12/22/17  Time:    22:21              Narrative:    Exam: XR CHEST AP PORTABLE,    Date:  12/22/17 22:12:48    History: Sepsis.    Comparison:  No prior  studies available for comparison.    Findings:     Patchy infiltrates scattered about the left lung.  Question of a mild infiltrate right lung base.  Heart size within normal limits.  Vascular calcification of aorta.  No significant bony findings.

## 2017-12-24 NOTE — ASSESSMENT & PLAN NOTE
Cardiology consulted, appreciate recs  Per Dr. Nolasco, Pt with atypical CP and (+) troponin. EKG is unremarkable and echo is normal. recommended continue IV diuresis, IV heparin x 48 hours, asa, plavix, statin, b blockers. Per cardiology, recommendations are for conservative therapy, lexiscan stress once diuresed.

## 2017-12-24 NOTE — ASSESSMENT & PLAN NOTE
Moderate sliding scale   Detemir  Check a1c  Monitor  Encourage diet   May need additonal teaching

## 2017-12-24 NOTE — PT/OT/SLP PROGRESS
Speech Language Pathology      Olivia Reyes  MRN: [id number]    Patient not seen today secondary to having increased respiratory difficulty. Nursing requested follow-up 12/26/2017.    Ciara Araujo CCC-SLP

## 2017-12-24 NOTE — HOSPITAL COURSE
89 y/u female with PMHx with PMhx for HTN, HLP, DM presents with CHF and possible pneumonia. Troponin trended up and cardiology consulted.   Per Dr. Nolasco, patient with atypical CP and (+) troponin. EKG is unremarkable and echo is normal. Cardiology recommended continue IV diuresis, IV heparin x 48 hours, asa, plavix, statin, b blockers. Recommendations are for conservative therapy, lexiscan stress once diuresed.  12/24 - CTA today revealed small to moderate bilateral pleural effusions. Extensive bilateral infiltrates/pneumonia. Chronic lung changes. Negative for acute thoracic aortic aneurysm or dissection. Negative for acute pulmonary emboli. On BiPap. Moxi Iv. Acetylcysteine nebs. Hypomagnesemia and hypokalemia. Replacing. BP uncontrolled, increasing amlodipine from 5 mg to 10mg daily. Patient continues on lasix 40mg IV BID with I/O -2472 over last 24 hours. Troponin peaked and now downtrending. Remains on heparin gtt with nitro ointment  12/25 - Patient adequately diuresed and is now having mild RENA. D/c lasix. Hyperglycemia uncontrolled. Increased insulin. Remains in ICU in critical condition. Continuing to treat HF and PNA aggressively.   12/26 - Elevated troponin is from NSTEMI secondary to demand ischemia from pneumonia/respiratory failure. Patient is agreeable to medical management, does not wish for any invasive procedures, including LHC. She is to continue on ASA, Plavix, norvasc, Lisinopril, statin. Will D/C nitropaste, start Imdur 30 mg daily. Will consider BB, will hold off for time being given bradycardia noted this AM.   12/27 - Patient improving slowly.     12/28 - Patient POC discussed with family. Plan for Comfort Care and Hospice referral initiated. Patient communicative, denies discomfort.     12/29 - Patient 89 yo female admitted to hospital for SOB found to have Pneumonia and having experienced a NSTEMI. Patient initiated on broad spectrum antibiotics and treated symptomatically. Patient  evaluated by Cardiology. Patient family POA directing no invasive measures and patients care directed as medical management. Patient experiencing significant bruising related to anticoagulation and Plavix has been d/cd prior to her discharge. Patient family met with hospice and have selected [facility] the [facility] for continued comfort care. The patient was seen and examined today prior to her discharge to hospice care.

## 2017-12-25 LAB
ANION GAP SERPL CALC-SCNC: 13 MMOL/L
APTT BLDCRRT: 51.1 SEC
BASOPHILS # BLD AUTO: 0.01 K/UL
BASOPHILS NFR BLD: 0.1 %
BUN SERPL-MCNC: 33 MG/DL
CALCIUM SERPL-MCNC: 8.4 MG/DL
CHLORIDE SERPL-SCNC: 100 MMOL/L
CO2 SERPL-SCNC: 24 MMOL/L
CREAT SERPL-MCNC: 1.2 MG/DL
DIFFERENTIAL METHOD: ABNORMAL
EOSINOPHIL # BLD AUTO: 0 K/UL
EOSINOPHIL NFR BLD: 0 %
ERYTHROCYTE [DISTWIDTH] IN BLOOD BY AUTOMATED COUNT: 14.5 %
EST. GFR  (AFRICAN AMERICAN): 46 ML/MIN/1.73 M^2
EST. GFR  (NON AFRICAN AMERICAN): 40 ML/MIN/1.73 M^2
GLUCOSE SERPL-MCNC: 262 MG/DL
HCT VFR BLD AUTO: 34.4 %
HGB BLD-MCNC: 11.4 G/DL
LYMPHOCYTES # BLD AUTO: 0.4 K/UL
LYMPHOCYTES NFR BLD: 5.3 %
MAGNESIUM SERPL-MCNC: 1.9 MG/DL
MCH RBC QN AUTO: 30.1 PG
MCHC RBC AUTO-ENTMCNC: 33.1 G/DL
MCV RBC AUTO: 91 FL
MONOCYTES # BLD AUTO: 0.3 K/UL
MONOCYTES NFR BLD: 4.5 %
NEUTROPHILS # BLD AUTO: 6.4 K/UL
NEUTROPHILS NFR BLD: 90.1 %
PLATELET # BLD AUTO: 226 K/UL
PMV BLD AUTO: 9.7 FL
POCT GLUCOSE: 258 MG/DL (ref 70–110)
POCT GLUCOSE: 260 MG/DL (ref 70–110)
POCT GLUCOSE: 291 MG/DL (ref 70–110)
POCT GLUCOSE: 292 MG/DL (ref 70–110)
POTASSIUM SERPL-SCNC: 3.5 MMOL/L
RBC # BLD AUTO: 3.79 M/UL
SODIUM SERPL-SCNC: 137 MMOL/L
WBC # BLD AUTO: 7.15 K/UL

## 2017-12-25 PROCEDURE — 20000000 HC ICU ROOM

## 2017-12-25 PROCEDURE — 99900035 HC TECH TIME PER 15 MIN (STAT)

## 2017-12-25 PROCEDURE — 85025 COMPLETE CBC W/AUTO DIFF WBC: CPT

## 2017-12-25 PROCEDURE — 83735 ASSAY OF MAGNESIUM: CPT

## 2017-12-25 PROCEDURE — 63600175 PHARM REV CODE 636 W HCPCS: Performed by: NURSE PRACTITIONER

## 2017-12-25 PROCEDURE — 99232 SBSQ HOSP IP/OBS MODERATE 35: CPT | Mod: ,,, | Performed by: INTERNAL MEDICINE

## 2017-12-25 PROCEDURE — 80048 BASIC METABOLIC PNL TOTAL CA: CPT

## 2017-12-25 PROCEDURE — 63600175 PHARM REV CODE 636 W HCPCS: Performed by: INTERNAL MEDICINE

## 2017-12-25 PROCEDURE — 94668 MNPJ CHEST WALL SBSQ: CPT

## 2017-12-25 PROCEDURE — 25000242 PHARM REV CODE 250 ALT 637 W/ HCPCS: Performed by: INTERNAL MEDICINE

## 2017-12-25 PROCEDURE — 27100171 HC OXYGEN HIGH FLOW UP TO 24 HOURS

## 2017-12-25 PROCEDURE — 25000003 PHARM REV CODE 250

## 2017-12-25 PROCEDURE — 36415 COLL VENOUS BLD VENIPUNCTURE: CPT

## 2017-12-25 PROCEDURE — 27000190 HC CPAP FULL FACE MASK W/VALVE

## 2017-12-25 PROCEDURE — 25000003 PHARM REV CODE 250: Performed by: NURSE PRACTITIONER

## 2017-12-25 PROCEDURE — 25000003 PHARM REV CODE 250: Performed by: INTERNAL MEDICINE

## 2017-12-25 PROCEDURE — 27100092 HC HIGH FLOW DELIVERY CANNULA

## 2017-12-25 PROCEDURE — 99291 CRITICAL CARE FIRST HOUR: CPT | Mod: ,,, | Performed by: INTERNAL MEDICINE

## 2017-12-25 PROCEDURE — 94660 CPAP INITIATION&MGMT: CPT

## 2017-12-25 PROCEDURE — 85730 THROMBOPLASTIN TIME PARTIAL: CPT

## 2017-12-25 PROCEDURE — 94760 N-INVAS EAR/PLS OXIMETRY 1: CPT

## 2017-12-25 PROCEDURE — 94640 AIRWAY INHALATION TREATMENT: CPT

## 2017-12-25 RX ORDER — LINEZOLID 2 MG/ML
600 INJECTION, SOLUTION INTRAVENOUS
Status: DISCONTINUED | OUTPATIENT
Start: 2017-12-25 | End: 2017-12-29

## 2017-12-25 RX ADMIN — HYDRALAZINE HYDROCHLORIDE 10 MG: 20 INJECTION INTRAMUSCULAR; INTRAVENOUS at 10:12

## 2017-12-25 RX ADMIN — AMLODIPINE BESYLATE 10 MG: 10 TABLET ORAL at 09:12

## 2017-12-25 RX ADMIN — PIPERACILLIN AND TAZOBACTAM 4.5 G: 4; .5 INJECTION, POWDER, FOR SOLUTION INTRAVENOUS at 08:12

## 2017-12-25 RX ADMIN — NITROGLYCERIN 1 INCH: 20 OINTMENT TOPICAL at 11:12

## 2017-12-25 RX ADMIN — PIPERACILLIN AND TAZOBACTAM 4.5 G: 4; .5 INJECTION, POWDER, FOR SOLUTION INTRAVENOUS at 12:12

## 2017-12-25 RX ADMIN — INSULIN ASPART 6 UNITS: 100 INJECTION, SOLUTION INTRAVENOUS; SUBCUTANEOUS at 06:12

## 2017-12-25 RX ADMIN — LEVOTHYROXINE SODIUM 100 MCG: 100 TABLET ORAL at 05:12

## 2017-12-25 RX ADMIN — METHYLPREDNISOLONE SODIUM SUCCINATE 40 MG: 40 INJECTION, POWDER, FOR SOLUTION INTRAMUSCULAR; INTRAVENOUS at 11:12

## 2017-12-25 RX ADMIN — SIMVASTATIN 20 MG: 20 TABLET, FILM COATED ORAL at 08:12

## 2017-12-25 RX ADMIN — ASPIRIN 81 MG CHEWABLE TABLET 81 MG: 81 TABLET CHEWABLE at 09:12

## 2017-12-25 RX ADMIN — INSULIN ASPART 3 UNITS: 100 INJECTION, SOLUTION INTRAVENOUS; SUBCUTANEOUS at 09:12

## 2017-12-25 RX ADMIN — NITROGLYCERIN 1 INCH: 20 OINTMENT TOPICAL at 06:12

## 2017-12-25 RX ADMIN — IPRATROPIUM BROMIDE AND ALBUTEROL SULFATE 3 ML: .5; 3 SOLUTION RESPIRATORY (INHALATION) at 08:12

## 2017-12-25 RX ADMIN — NITROGLYCERIN 1 INCH: 20 OINTMENT TOPICAL at 05:12

## 2017-12-25 RX ADMIN — METHYLPREDNISOLONE SODIUM SUCCINATE 40 MG: 40 INJECTION, POWDER, FOR SOLUTION INTRAMUSCULAR; INTRAVENOUS at 12:12

## 2017-12-25 RX ADMIN — METHYLPREDNISOLONE SODIUM SUCCINATE 40 MG: 40 INJECTION, POWDER, FOR SOLUTION INTRAMUSCULAR; INTRAVENOUS at 05:12

## 2017-12-25 RX ADMIN — METHYLPREDNISOLONE SODIUM SUCCINATE 40 MG: 40 INJECTION, POWDER, FOR SOLUTION INTRAMUSCULAR; INTRAVENOUS at 06:12

## 2017-12-25 RX ADMIN — SERTRALINE HYDROCHLORIDE 50 MG: 50 TABLET ORAL at 09:12

## 2017-12-25 RX ADMIN — INSULIN ASPART 6 UNITS: 100 INJECTION, SOLUTION INTRAVENOUS; SUBCUTANEOUS at 05:12

## 2017-12-25 RX ADMIN — PIPERACILLIN AND TAZOBACTAM 4.5 G: 4; .5 INJECTION, POWDER, FOR SOLUTION INTRAVENOUS at 04:12

## 2017-12-25 RX ADMIN — NITROGLYCERIN 1 INCH: 20 OINTMENT TOPICAL at 12:12

## 2017-12-25 RX ADMIN — LISINOPRIL 40 MG: 20 TABLET ORAL at 08:12

## 2017-12-25 RX ADMIN — CLOPIDOGREL BISULFATE 75 MG: 75 TABLET ORAL at 09:12

## 2017-12-25 RX ADMIN — ACETYLCYSTEINE 2 ML: 200 SOLUTION ORAL; RESPIRATORY (INHALATION) at 08:12

## 2017-12-25 RX ADMIN — LINEZOLID 600 MG: 2 INJECTION, SOLUTION INTRAVENOUS at 09:12

## 2017-12-25 RX ADMIN — INSULIN ASPART 6 UNITS: 100 INJECTION, SOLUTION INTRAVENOUS; SUBCUTANEOUS at 11:12

## 2017-12-25 RX ADMIN — PANTOPRAZOLE SODIUM 40 MG: 40 TABLET, DELAYED RELEASE ORAL at 09:12

## 2017-12-25 RX ADMIN — MOXIFLOXACIN HYDROCHLORIDE 400 MG: 400 INJECTION, SOLUTION INTRAVENOUS at 12:12

## 2017-12-25 RX ADMIN — MEMANTINE HYDROCHLORIDE 5 MG: 5 TABLET ORAL at 08:12

## 2017-12-25 NOTE — ASSESSMENT & PLAN NOTE
Supplement oxygenation. High Flow Oxygen.  Keep SAO2 >= 92%. BIPAP 10/5 QHS and PRN. Bronchodilators per orders. IV Methylprednisone.

## 2017-12-25 NOTE — ASSESSMENT & PLAN NOTE
Monitor fever curve. panculture for temperatures greater than 101 degrees F. Source control: IV ZYVOX, IV ZOSYN. Discontinue moxifloxacin

## 2017-12-25 NOTE — PLAN OF CARE
Problem: Patient Care Overview  Goal: Plan of Care Review  Outcome: Ongoing (interventions implemented as appropriate)  Patient remained on venti mask until 2230 and was placed on bipap at that time.  Barrier cream applied to patient after incontinence care.  Patient denies any chest pain during the night.  See MAR for medication doses and times of administration.

## 2017-12-25 NOTE — SUBJECTIVE & OBJECTIVE
Interval History: Seen and examined at bedside. Hospital chart reviewed. No acute interval detrimental events noted. She reports that she  is unchanged.      Review of Systems   HENT: Negative.    Eyes: Negative.    Respiratory: Positive for cough and shortness of breath.    Cardiovascular: Negative.    Gastrointestinal: Negative.    Genitourinary: Negative.    Musculoskeletal: Negative.    Skin: Negative.    Neurological: Positive for weakness.   Endo/Heme/Allergies: Negative.    Psychiatric/Behavioral: Negative.        Scheduled Meds:   amLODIPine  10 mg Oral Daily    aspirin  81 mg Oral Daily    clopidogrel  75 mg Oral Daily    insulin detemir  15 Units Subcutaneous QHS    levothyroxine  100 mcg Oral Before breakfast    linezolid 600mg/300ml  600 mg Intravenous Q12H    lisinopril  40 mg Oral QHS    magnesium sulfate IVPB  1 g Intravenous Once    memantine  5 mg Oral QHS    methylPREDNISolone sodium succinate  40 mg Intravenous Q6H    nitroGLYCERIN 2% TD oint  1 inch Topical (Top) Q6H    pantoprazole  40 mg Oral Daily    piperacillin-tazobactam 4.5 g in sodium chloride 0.9% 100 mL IVPB (ready to mix system)  4.5 g Intravenous Q8H    sertraline  50 mg Oral Daily    simvastatin  20 mg Oral QHS     Continuous Infusions:   heparin (porcine) in D5W Stopped (12/24/17 1200)     PRN Meds:.acetaminophen, albuterol-ipratropium 2.5mg-0.5mg/3mL, dextrose 50%, dextrose 50%, glucagon (human recombinant), glucose, glucose, heparin (PORCINE), heparin (PORCINE), hydrALAZINE, insulin aspart     Objective:     Vital Signs (Most Recent):  Temp: 98.1 °F (36.7 °C) (12/25/17 0705)  Pulse: 61 (12/25/17 0832)  Resp: 18 (12/25/17 0832)  BP: (!) 173/37 (12/25/17 0800)  SpO2: (!) 90 % (12/25/17 0832) Vital Signs (24h Range):  Temp:  [98.1 °F (36.7 °C)-98.6 °F (37 °C)] 98.1 °F (36.7 °C)  Pulse:  [58-79] 61  Resp:  [15-30] 18  SpO2:  [88 %-99 %] 90 %  BP: (140-203)/() 173/37     Weight: 72.9 kg (160 lb 11.5 oz)  Body mass  index is 27.59 kg/m².      Intake/Output Summary (Last 24 hours) at 12/25/17 0934  Last data filed at 12/25/17 0700   Gross per 24 hour   Intake            758.9 ml   Output             1125 ml   Net           -366.1 ml       Physical Exam   Constitutional: She is oriented to person, place, and time. She appears well-nourished.   HENT:   Head: Normocephalic and atraumatic.   Mouth/Throat: Oropharynx is clear and moist. No oropharyngeal exudate.   Eyes: EOM are normal. Pupils are equal, round, and reactive to light.   Neck: Normal range of motion. Neck supple.   Cardiovascular: Normal rate, regular rhythm and normal heart sounds.    Pulmonary/Chest: Effort normal. She has rales.   Abdominal: Soft. Bowel sounds are normal.   Genitourinary:   Genitourinary Comments: Has hawkins   Musculoskeletal: Normal range of motion.   Neurological: She is alert and oriented to person, place, and time.   Skin: Skin is warm and dry. Capillary refill takes 2 to 3 seconds.   Psychiatric: She has a normal mood and affect.   Nursing note and vitals reviewed.      Vents:  Oxygen Concentration (%): 40 (12/25/17 0832)    Lines/Drains/Airways     Drain                 Urethral Catheter 12/22/17 2230 Non-latex 16 Fr. 2 days          Peripheral Intravenous Line                 Peripheral IV - Single Lumen 12/22/17 Right Forearm 3 days         Peripheral IV - Single Lumen 12/22/17 2210 Left Forearm 2 days                Significant Labs:    CBC/Anemia Profile:    Recent Labs  Lab 12/24/17  0338 12/25/17  0412   WBC 5.10 7.15   HGB 11.7* 11.4*   HCT 35.8* 34.4*    226   MCV 91 91   RDW 14.4 14.5        Chemistries:    Recent Labs  Lab 12/24/17  0338 12/24/17  0400 12/25/17  0412     --  137   K 2.9*  --  3.5     --  100   CO2 26  --  24   BUN 18  --  33*   CREATININE 0.8  --  1.2   CALCIUM 8.0*  --  8.4*   MG  --  1.4* 1.9       ABGs:   Recent Labs  Lab 12/24/17  0944   PH 7.373   PCO2 47.5*   HCO3 27.6   POCSATURATED 94*   BE 2      Respiratory Culture:   Recent Labs  Lab 12/24/17  1032   GSRESP <10 epithelial cells per low power field.  Rare WBC's  No organisms seen     Significant Imaging:    CTA Chest Non Coronary :     There are bilateral pleural effusions of a small to slightly moderate degree. There are small mediastinal nodes throughout of indeterminate significance and etiology. There is opacification of the thoracic aorta without evidence of aneurysm formation or dissection. There is opacification of the pulmonary arterial system. There no gross filling defects within the major branches.    There are chronic lung changes bilaterally. Extensive groundglass infiltrates are noted bilaterally scattered throughout both lungs with partial consolidation posteriorly at the lung bases. There is also partial consolidation along the perihilar cysts or perihilar zones, especially on the right anteromedially involving the upper lobe.    The upper abdomen shows no acute abnormality.

## 2017-12-25 NOTE — ASSESSMENT & PLAN NOTE
Hyperglycemia: INSULIN ASPART+ INSULIN DETEMIR. Blood glucose target 100 - 180 mg/dl  Levothyroxine 100 mcg PO

## 2017-12-26 LAB
ANION GAP SERPL CALC-SCNC: 14 MMOL/L
BASOPHILS # BLD AUTO: 0 K/UL
BASOPHILS NFR BLD: 0 %
BUN SERPL-MCNC: 44 MG/DL
CALCIUM SERPL-MCNC: 8.4 MG/DL
CHLORIDE SERPL-SCNC: 100 MMOL/L
CO2 SERPL-SCNC: 24 MMOL/L
CREAT SERPL-MCNC: 1.3 MG/DL
DIFFERENTIAL METHOD: ABNORMAL
EOSINOPHIL # BLD AUTO: 0 K/UL
EOSINOPHIL NFR BLD: 0 %
ERYTHROCYTE [DISTWIDTH] IN BLOOD BY AUTOMATED COUNT: 14.4 %
EST. GFR  (AFRICAN AMERICAN): 41 ML/MIN/1.73 M^2
EST. GFR  (NON AFRICAN AMERICAN): 36 ML/MIN/1.73 M^2
GLUCOSE SERPL-MCNC: 235 MG/DL
HCT VFR BLD AUTO: 36.2 %
HGB BLD-MCNC: 12.2 G/DL
LYMPHOCYTES # BLD AUTO: 0.5 K/UL
LYMPHOCYTES NFR BLD: 3.2 %
MAGNESIUM SERPL-MCNC: 2 MG/DL
MCH RBC QN AUTO: 30.3 PG
MCHC RBC AUTO-ENTMCNC: 33.7 G/DL
MCV RBC AUTO: 90 FL
MONOCYTES # BLD AUTO: 0.5 K/UL
MONOCYTES NFR BLD: 3.6 %
NEUTROPHILS # BLD AUTO: 13.2 K/UL
NEUTROPHILS NFR BLD: 93.5 %
PLATELET # BLD AUTO: 256 K/UL
PMV BLD AUTO: 9.5 FL
POCT GLUCOSE: 154 MG/DL (ref 70–110)
POCT GLUCOSE: 189 MG/DL (ref 70–110)
POCT GLUCOSE: 223 MG/DL (ref 70–110)
POCT GLUCOSE: 254 MG/DL (ref 70–110)
POTASSIUM SERPL-SCNC: 3.1 MMOL/L
RBC # BLD AUTO: 4.03 M/UL
SODIUM SERPL-SCNC: 138 MMOL/L
WBC # BLD AUTO: 14.18 K/UL

## 2017-12-26 PROCEDURE — 93010 ELECTROCARDIOGRAM REPORT: CPT | Mod: ,,, | Performed by: INTERNAL MEDICINE

## 2017-12-26 PROCEDURE — 99233 SBSQ HOSP IP/OBS HIGH 50: CPT | Mod: ,,, | Performed by: INTERNAL MEDICINE

## 2017-12-26 PROCEDURE — 93005 ELECTROCARDIOGRAM TRACING: CPT

## 2017-12-26 PROCEDURE — 25000003 PHARM REV CODE 250: Performed by: INTERNAL MEDICINE

## 2017-12-26 PROCEDURE — 80048 BASIC METABOLIC PNL TOTAL CA: CPT

## 2017-12-26 PROCEDURE — 94640 AIRWAY INHALATION TREATMENT: CPT

## 2017-12-26 PROCEDURE — 63600175 PHARM REV CODE 636 W HCPCS: Performed by: NURSE PRACTITIONER

## 2017-12-26 PROCEDURE — 63600175 PHARM REV CODE 636 W HCPCS: Performed by: INTERNAL MEDICINE

## 2017-12-26 PROCEDURE — 25000003 PHARM REV CODE 250: Performed by: NURSE PRACTITIONER

## 2017-12-26 PROCEDURE — 27100171 HC OXYGEN HIGH FLOW UP TO 24 HOURS

## 2017-12-26 PROCEDURE — 25000242 PHARM REV CODE 250 ALT 637 W/ HCPCS: Performed by: INTERNAL MEDICINE

## 2017-12-26 PROCEDURE — 99291 CRITICAL CARE FIRST HOUR: CPT | Mod: ,,, | Performed by: INTERNAL MEDICINE

## 2017-12-26 PROCEDURE — 63600175 PHARM REV CODE 636 W HCPCS

## 2017-12-26 PROCEDURE — 27100092 HC HIGH FLOW DELIVERY CANNULA

## 2017-12-26 PROCEDURE — 25000003 PHARM REV CODE 250

## 2017-12-26 PROCEDURE — 85025 COMPLETE CBC W/AUTO DIFF WBC: CPT

## 2017-12-26 PROCEDURE — 92610 EVALUATE SWALLOWING FUNCTION: CPT

## 2017-12-26 PROCEDURE — 20000000 HC ICU ROOM

## 2017-12-26 PROCEDURE — 83735 ASSAY OF MAGNESIUM: CPT

## 2017-12-26 PROCEDURE — 36415 COLL VENOUS BLD VENIPUNCTURE: CPT

## 2017-12-26 PROCEDURE — 94668 MNPJ CHEST WALL SBSQ: CPT

## 2017-12-26 RX ORDER — POTASSIUM CHLORIDE 20 MEQ/1
40 TABLET, EXTENDED RELEASE ORAL EVERY 4 HOURS
Status: COMPLETED | OUTPATIENT
Start: 2017-12-26 | End: 2017-12-26

## 2017-12-26 RX ORDER — BALSALAZIDE DISODIUM 750 MG/1
750 CAPSULE ORAL 3 TIMES DAILY
Status: DISCONTINUED | OUTPATIENT
Start: 2017-12-26 | End: 2017-12-26

## 2017-12-26 RX ORDER — BALSALAZIDE DISODIUM 750 MG/1
2250 CAPSULE ORAL 3 TIMES DAILY
Status: DISCONTINUED | OUTPATIENT
Start: 2017-12-26 | End: 2017-12-26

## 2017-12-26 RX ORDER — BALSALAZIDE DISODIUM 750 MG/1
750 CAPSULE ORAL 3 TIMES DAILY
Status: DISCONTINUED | OUTPATIENT
Start: 2017-12-26 | End: 2017-12-29 | Stop reason: HOSPADM

## 2017-12-26 RX ORDER — ISOSORBIDE MONONITRATE 30 MG/1
30 TABLET, EXTENDED RELEASE ORAL DAILY
Status: DISCONTINUED | OUTPATIENT
Start: 2017-12-26 | End: 2017-12-29 | Stop reason: HOSPADM

## 2017-12-26 RX ORDER — HEPARIN SODIUM 5000 [USP'U]/ML
5000 INJECTION, SOLUTION INTRAVENOUS; SUBCUTANEOUS EVERY 8 HOURS
Status: DISCONTINUED | OUTPATIENT
Start: 2017-12-26 | End: 2017-12-29

## 2017-12-26 RX ADMIN — NITROGLYCERIN 1 INCH: 20 OINTMENT TOPICAL at 05:12

## 2017-12-26 RX ADMIN — ZINC OXIDE: 200 PASTE TOPICAL at 08:12

## 2017-12-26 RX ADMIN — LINEZOLID 600 MG: 2 INJECTION, SOLUTION INTRAVENOUS at 09:12

## 2017-12-26 RX ADMIN — PIPERACILLIN AND TAZOBACTAM 4.5 G: 4; .5 INJECTION, POWDER, FOR SOLUTION INTRAVENOUS at 03:12

## 2017-12-26 RX ADMIN — INSULIN ASPART 2 UNITS: 100 INJECTION, SOLUTION INTRAVENOUS; SUBCUTANEOUS at 05:12

## 2017-12-26 RX ADMIN — INSULIN ASPART 1 UNITS: 100 INJECTION, SOLUTION INTRAVENOUS; SUBCUTANEOUS at 08:12

## 2017-12-26 RX ADMIN — IPRATROPIUM BROMIDE AND ALBUTEROL SULFATE 3 ML: .5; 3 SOLUTION RESPIRATORY (INHALATION) at 08:12

## 2017-12-26 RX ADMIN — BALSALAZIDE DISODIUM 750 MG: 750 CAPSULE ORAL at 05:12

## 2017-12-26 RX ADMIN — BALSALAZIDE DISODIUM 750 MG: 750 CAPSULE ORAL at 09:12

## 2017-12-26 RX ADMIN — PANTOPRAZOLE SODIUM 40 MG: 40 TABLET, DELAYED RELEASE ORAL at 09:12

## 2017-12-26 RX ADMIN — INSULIN ASPART 4 UNITS: 100 INJECTION, SOLUTION INTRAVENOUS; SUBCUTANEOUS at 05:12

## 2017-12-26 RX ADMIN — PIPERACILLIN AND TAZOBACTAM 4.5 G: 4; .5 INJECTION, POWDER, FOR SOLUTION INTRAVENOUS at 12:12

## 2017-12-26 RX ADMIN — SIMVASTATIN 20 MG: 20 TABLET, FILM COATED ORAL at 08:12

## 2017-12-26 RX ADMIN — LEVOTHYROXINE SODIUM 100 MCG: 100 TABLET ORAL at 05:12

## 2017-12-26 RX ADMIN — METHYLPREDNISOLONE SODIUM SUCCINATE 40 MG: 40 INJECTION, POWDER, FOR SOLUTION INTRAMUSCULAR; INTRAVENOUS at 05:12

## 2017-12-26 RX ADMIN — LINEZOLID 600 MG: 2 INJECTION, SOLUTION INTRAVENOUS at 11:12

## 2017-12-26 RX ADMIN — HEPARIN SODIUM 5000 UNITS: 5000 INJECTION, SOLUTION INTRAVENOUS; SUBCUTANEOUS at 09:12

## 2017-12-26 RX ADMIN — CLOPIDOGREL BISULFATE 75 MG: 75 TABLET ORAL at 09:12

## 2017-12-26 RX ADMIN — ZINC OXIDE: 200 PASTE TOPICAL at 02:12

## 2017-12-26 RX ADMIN — ASPIRIN 81 MG CHEWABLE TABLET 81 MG: 81 TABLET CHEWABLE at 09:12

## 2017-12-26 RX ADMIN — PIPERACILLIN AND TAZOBACTAM 4.5 G: 4; .5 INJECTION, POWDER, FOR SOLUTION INTRAVENOUS at 08:12

## 2017-12-26 RX ADMIN — HYDRALAZINE HYDROCHLORIDE 10 MG: 20 INJECTION INTRAMUSCULAR; INTRAVENOUS at 11:12

## 2017-12-26 RX ADMIN — MEMANTINE HYDROCHLORIDE 5 MG: 5 TABLET ORAL at 08:12

## 2017-12-26 RX ADMIN — POTASSIUM CHLORIDE 40 MEQ: 1500 TABLET, EXTENDED RELEASE ORAL at 01:12

## 2017-12-26 RX ADMIN — AMLODIPINE BESYLATE 10 MG: 10 TABLET ORAL at 09:12

## 2017-12-26 RX ADMIN — LISINOPRIL 40 MG: 20 TABLET ORAL at 08:12

## 2017-12-26 RX ADMIN — POTASSIUM CHLORIDE 40 MEQ: 1500 TABLET, EXTENDED RELEASE ORAL at 09:12

## 2017-12-26 RX ADMIN — INSULIN DETEMIR 15 UNITS: 100 INJECTION, SOLUTION SUBCUTANEOUS at 08:12

## 2017-12-26 RX ADMIN — ISOSORBIDE MONONITRATE 30 MG: 30 TABLET, EXTENDED RELEASE ORAL at 11:12

## 2017-12-26 RX ADMIN — HEPARIN SODIUM 5000 UNITS: 5000 INJECTION, SOLUTION INTRAVENOUS; SUBCUTANEOUS at 02:12

## 2017-12-26 RX ADMIN — SERTRALINE HYDROCHLORIDE 50 MG: 50 TABLET ORAL at 09:12

## 2017-12-26 NOTE — ASSESSMENT & PLAN NOTE
Pulmonary consult, appreciate recs  CTA 12/24, images and radiologist interpretation and I personally reviewed   Bilateral infiltrates consistent with PNA  Empiric abx: Zyvox and Zosyn  Methylpred 40mg q6h has been d/c  Sputum culture  blood cultures NGTD 12/19  Swallow eval

## 2017-12-26 NOTE — PROGRESS NOTES
Patient was on venti mask this morning, placed on vapo per order of DR. Randle . Patient tolerating well , in no distress at this time noted

## 2017-12-26 NOTE — SUBJECTIVE & OBJECTIVE
Interval History: Seen and examined at bedside. Hospital chart reviewed. No acute interval detrimental events noted. She reports that she  is unchanged.      Review of Systems   HENT: Negative.    Eyes: Negative.    Respiratory: Positive for cough and shortness of breath.    Cardiovascular: Negative.    Gastrointestinal: Negative.    Genitourinary: Negative.    Musculoskeletal: Negative.    Skin: Negative.    Neurological: Positive for weakness.   Endo/Heme/Allergies: Negative.    Psychiatric/Behavioral: Negative.        Scheduled Meds:   amLODIPine  10 mg Oral Daily    aspirin  81 mg Oral Daily    clopidogrel  75 mg Oral Daily    heparin (porcine)  5,000 Units Subcutaneous Q8H    insulin detemir  15 Units Subcutaneous QHS    isosorbide mononitrate  30 mg Oral Daily    levothyroxine  100 mcg Oral Before breakfast    linezolid 600mg/300ml  600 mg Intravenous Q12H    lisinopril  40 mg Oral QHS    memantine  5 mg Oral QHS    pantoprazole  40 mg Oral Daily    piperacillin-tazobactam 4.5 g in sodium chloride 0.9% 100 mL IVPB (ready to mix system)  4.5 g Intravenous Q8H    potassium chloride  40 mEq Oral Q4H    sertraline  50 mg Oral Daily    simvastatin  20 mg Oral QHS    zinc oxide-petrolatum   Topical (Top) BID     Continuous Infusions:    PRN Meds:.acetaminophen, albuterol-ipratropium 2.5mg-0.5mg/3mL, dextrose 50%, dextrose 50%, glucagon (human recombinant), glucose, glucose, hydrALAZINE, insulin aspart     Objective:     Vital Signs (Most Recent):  Temp: 98.7 °F (37.1 °C) (12/26/17 0704)  Pulse: (!) 55 (12/26/17 1104)  Resp: 16 (12/26/17 1104)  BP: (!) 168/44 (12/26/17 1104)  SpO2: (!) 94 % (12/26/17 1104) Vital Signs (24h Range):  Temp:  [98 °F (36.7 °C)-98.7 °F (37.1 °C)] 98.7 °F (37.1 °C)  Pulse:  [55-69] 55  Resp:  [10-22] 16  SpO2:  [89 %-95 %] 94 %  BP: ()/() 168/44     Weight: 74.3 kg (163 lb 12.8 oz)  Body mass index is 28.12 kg/m².      Intake/Output Summary (Last 24 hours) at  12/26/17 1113  Last data filed at 12/26/17 0600   Gross per 24 hour   Intake              720 ml   Output              535 ml   Net              185 ml       Physical Exam   Constitutional: She is oriented to person, place, and time. She appears well-nourished.   HENT:   Head: Normocephalic and atraumatic.   Mouth/Throat: Oropharynx is clear and moist. No oropharyngeal exudate.   Eyes: EOM are normal. Pupils are equal, round, and reactive to light.   Neck: Normal range of motion. Neck supple.   Cardiovascular: Normal rate, regular rhythm and normal heart sounds.    Pulmonary/Chest: Effort normal. She has rales.   Abdominal: Soft. Bowel sounds are normal.   Genitourinary:   Genitourinary Comments: Has hawkins   Musculoskeletal: Normal range of motion.   Neurological: She is alert and oriented to person, place, and time.   Skin: Skin is warm and dry. Capillary refill takes 2 to 3 seconds.   Psychiatric: She has a normal mood and affect.   Nursing note and vitals reviewed.      Vents:  Oxygen Concentration (%): 55 (12/26/17 1104)    Lines/Drains/Airways     Peripheral Intravenous Line                 Peripheral IV - Single Lumen 12/22/17 Right Forearm 4 days         Peripheral IV - Single Lumen 12/22/17 2210 Left Forearm 3 days                Significant Labs:    CBC/Anemia Profile:    Recent Labs  Lab 12/25/17  0412 12/26/17  0359   WBC 7.15 14.18*   HGB 11.4* 12.2   HCT 34.4* 36.2*    256   MCV 91 90   RDW 14.5 14.4        Chemistries:    Recent Labs  Lab 12/25/17  0412 12/26/17  0359    138   K 3.5 3.1*    100   CO2 24 24   BUN 33* 44*   CREATININE 1.2 1.3   CALCIUM 8.4* 8.4*   MG 1.9 2.0       Significant Imaging:    CTA Chest Non Coronary :     There are bilateral pleural effusions of a small to slightly moderate degree. There are small mediastinal nodes throughout of indeterminate significance and etiology. There is opacification of the thoracic aorta without evidence of aneurysm formation or  dissection. There is opacification of the pulmonary arterial system. There no gross filling defects within the major branches.    There are chronic lung changes bilaterally. Extensive groundglass infiltrates are noted bilaterally scattered throughout both lungs with partial consolidation posteriorly at the lung bases. There is also partial consolidation along the perihilar cysts or perihilar zones, especially on the right anteromedially involving the upper lobe.    The upper abdomen shows no acute abnormality.

## 2017-12-26 NOTE — ASSESSMENT & PLAN NOTE
Cardiology consulted, appreciate recs  Trop trended down  asa, plavix, statin, imdur  b blockers on hold for relative bradycardia.   Per cardiology, recommendations are for conservative therapy, patient would not desire aggressive management with [facility] and we will continue medical management

## 2017-12-26 NOTE — CONSULTS
12/26/17 1015   Handoff Report   Given To PAXTON De Souza   Pain/Comfort Assessments   Pain Assessment Performed Yes       Number Scale   Presence of Pain denies   Skin   Skin WDL ex   Skin Color/Characteristics bruised (ecchymotic)   Skin Temperature warm   Skin Moisture dry   Skin Elasticity quick return to original state   Skin Integrity wound(s)   Mitchel Risk Assessment   Sensory Perception 3-->slightly limited   Moisture 3-->occasionally moist   Activity 1-->bedfast   Mobility 3-->slightly limited   Nutrition 3-->adequate   Friction and Shear 2-->potential problem   Mitchel Score 15       Wound 12/26/17 1030 Incontinence associated dermatitis perineum   Date First Assessed/Time First Assessed: 12/26/17 1030   Wound Type: Incontinence associated dermatitis  Location: perineum   Wound WDL ex   Dressing Appearance no dressing   Drainage Amount scant   Drainage Characteristics/Odor serosanguineous   Wound Base reddened;moist   Periwound Area redness;excoriated   Wound Edges jagged   Non-staged Wound Description Partial thickness   Cleansed W/ soap and water   Interventions barrier applied  (Critic Aid Paste barrier ordered - Per MAR)   Skin Interventions   Pressure Reduction Devices pressure-redistributing mattress utilized;positioning supports utilized   Pressure Reduction Techniques frequent weight shift encouraged;heels elevated off bed;positioned off wounds;pressure points protected   Skin Protection adhesive use limited;incontinence pads utilized;skin sealant/moisture barrier applied;tubing/devices free from skin contact   Positioning   Body Position side-lying, left   Head of Bed (HOB) HOB at 30 degrees   Positioning/Transfer Devices pillows;wedge;in use   Hygiene Care   Bathing/Skin Care incontinence care   Perineal Care absorbent pad changed;perineum cleansed;protective ointment applied;skin sealant/barrier applied     Consulted on this 89 y/o F patient for skin breakdown and IAD to perineum and bilateral  "buttock. Patient admitted with acute respiratory failure, and has a PMH significant for DM, HTN, hypothyroid, IBS, osteoporosis, sacroidosis.  She is incontinent of stool, her hawkins catheter was removed this morning.  She is having frequent loose stools when she coughs.  She turned to left side and perineum and bilateral buttock assessed.  IAD noted to perineum from posterior labia to heena-rectum.  Area is reddened with partial thickness tissue loss noted and moist red wound beds, scant serosanguinous drainage noted.  During assessment, patient had small loose brown stool incontinent episode, and cleansed with easi cleanse foam wipes, patted dry, and covidien incontinence pad folded and applied to diaper area. Wound care recommends ordering Critic Aid Paste for barrier to area as breakdown has worsened with use of critic aid clear.  Please see below:    IAD to Perineum:  1. Cleanse with easi cleanse foam wipes  2. Pat dry  3. Apply critic aid paste barrier per MAR  4. May use Covidien incontinence pad, but no briefs please.    Skin Care Precautions / Pressure Injury Prevention:  1. Follow "Guidelines for Prevention of Pressure Ulcers in At Risk Patients"  These guidelines can be found on the [facility] Intr[employer]et by searching "Wound Care / Ostomy Resources"  2. Document wound assessment in [facility] using guidelines in Priscilla's "Assessment : Wound" procedure  3. Limit the amount of linen/underpad between patient and mattress surface to ONE fitted sheet and ONE covidien underpad - NO draw sheet/briefs.  4. Obtain Easi Cleans Foam Wipes for providing heena care - avoid the use of wash cloths to areas affected by IAD.  5. Apply Paste Barrier Ointment to perineal / perirectal areas in a thin even layer to clean dry skin BID and after each episode of pericare  6. Apply sween 24 moisturizer cream to all dry skin after daily bath and prn  7. Obtain foam wedge from materials management to assist with maintaining proper position changes " at least q 2hours and document actual position in EPIC q 2hours  8. Elevate heels off mattress on 2 separate pillows placed lengthwise under each leg supporting the leg from knee to ankle.  Document in EPIC flow sheet every 2 hours.  9. .Do NOT elevate HOB greater than 30 degrees unless contraindicated.  10. Remove SCD/Plexi Pulses/PAPA's every 12 hours for 30 minutes and assess skin underneath these devices for breakdown

## 2017-12-26 NOTE — PROGRESS NOTES
[facility] -   Critical Care Medicine  Progress Note    Patient Name: Olivia Reyes  MRN: [id number]  Admission Date: 12/22/2017  Hospital Length of Stay: 4 days  Code Status: DNR  Attending Provider: Polly Garcia MD  Primary Care Provider: No primary care provider on file.   Principal Problem: Acute respiratory failure with hypoxia and hypercarbia    Subjective:     HPI:  Ms Reyes is 89 year old female admitted to MICU for SOB  Hx obtained from Son.  Recent SOB, chest pressure and congestion, seen [facility] after hours, CXR reviewed, given Neb and Doxycycline  Declined at home with respiratory distress, and EMS called for WOB  Low grade fever, chest heaviness and Elevated BP SBP >190   ABGs pH7.315, CO2 52.3, PO2 70. Chest Xray: Patchy infiltrates scattered about the left lung.    The patient was placed on Bipap in ER with improvement in symptoms per patient.   No Hox of COPD   Old Dx of Sarcoidosis: lives in [place]  Is DNR per son     Hospital/ICU Course:  Treated with lasix  On Nasal canula  Heparin GTT started  EKG reviewed    12/24: Procalcitonin 0.62 and Trop 3.316  Required BIPAP overnight , adequate diuresis  Cough rattles    12/25: Clinically and hemodynamically stable. IV ZOSYN, AVELOX.     12/26: Clinically and hemodynamically stable. Tolerated VAPOTHERM. Off IV heparin. IV ZOSYN + ZYVOX.     Interval History: Seen and examined at bedside. Hospital chart reviewed. No acute interval detrimental events noted. She reports that she  is unchanged.      Review of Systems   HENT: Negative.    Eyes: Negative.    Respiratory: Positive for cough and shortness of breath.    Cardiovascular: Negative.    Gastrointestinal: Negative.    Genitourinary: Negative.    Musculoskeletal: Negative.    Skin: Negative.    Neurological: Positive for weakness.   Endo/Heme/Allergies: Negative.    Psychiatric/Behavioral: Negative.        Scheduled Meds:   amLODIPine  10 mg Oral Daily    aspirin  81 mg Oral Daily     clopidogrel  75 mg Oral Daily    heparin (porcine)  5,000 Units Subcutaneous Q8H    insulin detemir  15 Units Subcutaneous QHS    isosorbide mononitrate  30 mg Oral Daily    levothyroxine  100 mcg Oral Before breakfast    linezolid 600mg/300ml  600 mg Intravenous Q12H    lisinopril  40 mg Oral QHS    memantine  5 mg Oral QHS    pantoprazole  40 mg Oral Daily    piperacillin-tazobactam 4.5 g in sodium chloride 0.9% 100 mL IVPB (ready to mix system)  4.5 g Intravenous Q8H    potassium chloride  40 mEq Oral Q4H    sertraline  50 mg Oral Daily    simvastatin  20 mg Oral QHS    zinc oxide-petrolatum   Topical (Top) BID     Continuous Infusions:    PRN Meds:.acetaminophen, albuterol-ipratropium 2.5mg-0.5mg/3mL, dextrose 50%, dextrose 50%, glucagon (human recombinant), glucose, glucose, hydrALAZINE, insulin aspart     Objective:     Vital Signs (Most Recent):  Temp: 98.7 °F (37.1 °C) (12/26/17 0704)  Pulse: (!) 55 (12/26/17 1104)  Resp: 16 (12/26/17 1104)  BP: (!) 168/44 (12/26/17 1104)  SpO2: (!) 94 % (12/26/17 1104) Vital Signs (24h Range):  Temp:  [98 °F (36.7 °C)-98.7 °F (37.1 °C)] 98.7 °F (37.1 °C)  Pulse:  [55-69] 55  Resp:  [10-22] 16  SpO2:  [89 %-95 %] 94 %  BP: ()/() 168/44     Weight: 74.3 kg (163 lb 12.8 oz)  Body mass index is 28.12 kg/m².      Intake/Output Summary (Last 24 hours) at 12/26/17 1113  Last data filed at 12/26/17 0600   Gross per 24 hour   Intake              720 ml   Output              535 ml   Net              185 ml       Physical Exam   Constitutional: She is oriented to person, place, and time. She appears well-nourished.   HENT:   Head: Normocephalic and atraumatic.   Mouth/Throat: Oropharynx is clear and moist. No oropharyngeal exudate.   Eyes: EOM are normal. Pupils are equal, round, and reactive to light.   Neck: Normal range of motion. Neck supple.   Cardiovascular: Normal rate, regular rhythm and normal heart sounds.    Pulmonary/Chest: Effort normal. She  has rales.   Abdominal: Soft. Bowel sounds are normal.   Genitourinary:   Genitourinary Comments: Has hawkins   Musculoskeletal: Normal range of motion.   Neurological: She is alert and oriented to person, place, and time.   Skin: Skin is warm and dry. Capillary refill takes 2 to 3 seconds.   Psychiatric: She has a normal mood and affect.   Nursing note and vitals reviewed.      Vents:  Oxygen Concentration (%): 55 (12/26/17 1104)    Lines/Drains/Airways     Peripheral Intravenous Line                 Peripheral IV - Single Lumen 12/22/17 Right Forearm 4 days         Peripheral IV - Single Lumen 12/22/17 2210 Left Forearm 3 days                Significant Labs:    CBC/Anemia Profile:    Recent Labs  Lab 12/25/17  0412 12/26/17  0359   WBC 7.15 14.18*   HGB 11.4* 12.2   HCT 34.4* 36.2*    256   MCV 91 90   RDW 14.5 14.4        Chemistries:    Recent Labs  Lab 12/25/17  0412 12/26/17  0359    138   K 3.5 3.1*    100   CO2 24 24   BUN 33* 44*   CREATININE 1.2 1.3   CALCIUM 8.4* 8.4*   MG 1.9 2.0       Significant Imaging:    CTA Chest Non Coronary :     There are bilateral pleural effusions of a small to slightly moderate degree. There are small mediastinal nodes throughout of indeterminate significance and etiology. There is opacification of the thoracic aorta without evidence of aneurysm formation or dissection. There is opacification of the pulmonary arterial system. There no gross filling defects within the major branches.    There are chronic lung changes bilaterally. Extensive groundglass infiltrates are noted bilaterally scattered throughout both lungs with partial consolidation posteriorly at the lung bases. There is also partial consolidation along the perihilar cysts or perihilar zones, especially on the right anteromedially involving the upper lobe.    The upper abdomen shows no acute abnormality.    Assessment/Plan:       I have reviewed all labs and imaging studies and compared to previous  results. I have also discussed labs with all the teams in the medical care of the patient and my plan is outlined below     Neuro    Neurochecks per routine.          Psychiatric    MEMANTINE, SERTRALINE        ENT    Hearing aid        Pulmonary   * Acute respiratory failure with hypoxia and hypercarbia    Supplement oxygenation. High Flow Oxygen.  Keep SAO2 >= 92%. BIPAP 10/5 QHS and PRN. Bronchodilators per orders. SWITCH TO ORAL PREDNISONE.            Pneumonia of left lower lobe due to infectious organism    IV ZOSYN + ZYVOX.         Cardiac/Vascular     Monitor hemodynamics. MAP goal of 60 mmHg.        Acute CHF    Possible HFpEF  Responded with diuretics  PRELOAD AND AFTERLOAD reduction  Lisinopril 40 mg          Elevated troponin    ASA, PLAVIX, SIMVASTATIN, ISMO,  LISINOPRIL. Appreciate cardiology input.           Renal/    Monitor BUN / CR. Monitor urine output. Strict I/O. BID lasix.         ID    Monitor fever curve. panculture for temperatures greater than 101 degrees F. Source control: IV ZYVOX, IV ZOSYN.         Immunology/Multi System    Immunization status reviewed and updated.        Hematology    Monitor hemogram. Transfuse as needed.          Endocrine   Diabetes mellitus type 2, uncontrolled    Hyperglycemia: INSULIN ASPART+ INSULIN DETEMIR. Blood glucose target 100 - 180 mg/dl  Levothyroxine 100 mcg PO            GI    ADVANCE DIET AS TOLERATED. Stress ulcer prophylaxis.           Critical Care Daily Checklist:    A: Awake: RASS Goal/Actual Goal: RASS Goal: 0-->alert and calm  Actual:     B: Spontaneous Breathing Trial Performed?  N/A   C: SAT & SBT Coordinated?  M/A                    D: Delirium: CAM-ICU Overall CAM-ICU: Negative   E: Early Mobility Performed? Yes   F: Feeding Goal:    Status:     Current Diet Order   Procedures    Diet NPO Except for: Medication     Order Specific Question:   Except for     Answer:   Medication      AS: Analgesia/Sedation TYLENOL PRN   T: Thromboembolic  Prophylaxis LDUFH   H: HOB > 300 Yes   U: Stress Ulcer Prophylaxis (if needed) PANTOPRAZOLE   G: Glucose Control INSULIN ASPART + LEVIMIR   B: Bowel Function Stool Occurrence: 1   I: Indwelling Catheter (Lines & Macias) Necessity YES   D: De-escalation of Antimicrobials/Pharmacotherapies YES    Plan for the day/ETD Continue current. OK to transfer to telemetry.    Code Status:  Family/Goals of Care: DNR  Home with family     Critical Care Time: 60  Minutes    Critical secondary to Patient has a condition that poses threat to life and bodily function: ACUTE ON CHRONIC RESPIRATORY FAILURE.      Critical care was time spent personally by me on the following activities: development of treatment plan with patient or surrogate and bedside caregivers, discussions with consultants, evaluation of patient's response to treatment, examination of patient, ordering and performing treatments and interventions, ordering and review of laboratory studies, ordering and review of radiographic studies, pulse oximetry, re-evaluation of patient's condition. This critical care time did not overlap with that of any other provider or involve time for any procedures.     Dontae Randle MD  Critical Care Medicine  [facility] -

## 2017-12-26 NOTE — PLAN OF CARE
Problem: Patient Care Overview  Goal: Plan of Care Review  Outcome: Ongoing (interventions implemented as appropriate)  Patient currently resting quietly in bed. VS currently stable. Patient sinus bradycardic on monitor at this time. Patient currently receiving oxygen via vapotherm. Patient encouraged to turn in bed every 2 hours to avoid skin breakdown to back side. Patients heels elevated off bed. No sign of wound development during shift noted. Patient encouraged to use call light for all needs and educated on fall prevention. Patient free of falls. Patient has no complaints of pain or shortness of breath. Plan of care reviewed with patient and family. There are no further questions after updated on plan of care at this time. Will continue to monitor.

## 2017-12-26 NOTE — ASSESSMENT & PLAN NOTE
-Secondary to respiratory illness and uncontrolled BP  -Continue BP medications and IV lasix as needed

## 2017-12-26 NOTE — ASSESSMENT & PLAN NOTE
Monitor fever curve. panculture for temperatures greater than 101 degrees F. Source control: IV ZYVOX, IV ZOSYN.

## 2017-12-26 NOTE — PROGRESS NOTES
"[facility] Hospital Medicine  Progress Note    Patient Name: Olivia Reyes  MRN: [id number]  Patient Class: IP- Inpatient   Admission Date: 12/22/2017  Length of Stay: 3 days  Attending Physician: Polly Garcia MD  Primary Care Provider: No primary care provider on file.        Subjective:     Principal Problem:Acute respiratory failure with hypoxia and hypercarbia    HPI:  Olivia Reyes is a 89 y.o. female patient who presented to the Emergency Department for SOB. Onset gradually 1 week ago. Symptoms constant and worsening. Patient reports over past week that she has been noticing increaed SOB with excursion. Prior treatment neb, evaluation at [facility] after hours, and 1 doxycycline. The  states that the pt "deteriorated 3 hours later", which prompted the ED visit.  No mitigating or exacerbating factors reported. Associated sxs include chest pain and "low grade" fever (99.5). En route, pt was placed on CPAP by EMS.  denies any diaphoresis, dizziness, focal weakness/ numbness, cough, palpitations, leg swelling, n/v/d, and all other sxs at this time. No further complaints or concerns at this time. The patient was evaluated in the Er, CBC neg, CMP notable for hypergycemia and CO2 20. Troponin and BNP elevated. UA neg. ABGs pH7.315, CO2 52.3, PO2 70. Chest Xray: Patchy infiltrates scattered about the left lung.  Question of a mild infiltrate right lung base.  Heart size within normal limits.  Vascular calcification of aorta.  No significant bony findings. The patient was placed on Bipap in ER with improvement in symptoms per patient. The patient admitted to ICU with Bipap for Acute Resp Failure with hypoxemia and hypercap.     Hospital Course:  89 y/u female with PMHx with PMhx for HTN, HLP, DM presents with CHF and possible pneumonia. Troponin trended up and cardiology consulted.   Per Dr. Nolasco, patient with atypical CP and (+) troponin. EKG is unremarkable and echo is normal. " Cardiology recommended continue IV diuresis, IV heparin x 48 hours, asa, plavix, statin, b blockers. Recommendations are for conservative therapy, lexiscan stress once diuresed.  12/24 - CTA today revealed small to moderate bilateral pleural effusions. Extensive bilateral infiltrates/pneumonia. Chronic lung changes. Negative for acute thoracic aortic aneurysm or dissection. Negative for acute pulmonary emboli. On BiPap. Moxi Iv. Acetylcysteine nebs. Hypomagnesemia and hypokalemia. Replacing. BP uncontrolled, increasing amlodipine from 5 mg to 10mg daily. Patient continues on lasix 40mg IV BID with I/O -2472 over last 24 hours. Troponin peaked and now downtrending. Remains on heparin gtt with nitro ointment  12/25 - Patient adequately diuresed and is now having mild RENA. D/c lasix. Hyperglycemia uncontrolled. Increased insulin. Remains in ICU in critical condition. Continuing to treat HF and PNA aggressively.         Review of Systems   Unable to perform ROS: Severe respiratory distress   Constitutional: Positive for fatigue. Negative for chills and fever.   Eyes: Negative.    Respiratory: Positive for cough, chest tightness and shortness of breath.    Cardiovascular: Negative for chest pain, palpitations and leg swelling.   Gastrointestinal: Negative.    Endocrine: Negative.    Genitourinary: Negative.    Neurological: Negative.    Psychiatric/Behavioral: Negative.      Objective:     Vital Signs (Most Recent):  Temp: 98.4 °F (36.9 °C) (12/25/17 1905)  Pulse: 63 (12/25/17 1907)  Resp: 14 (12/25/17 1907)  BP: (!) 152/16 (12/25/17 1907)  SpO2: (!) 94 % (12/25/17 1907) Vital Signs (24h Range):  Temp:  [98 °F (36.7 °C)-98.4 °F (36.9 °C)] 98.4 °F (36.9 °C)  Pulse:  [58-73] 63  Resp:  [13-22] 14  SpO2:  [90 %-98 %] 94 %  BP: (130-187)/() 152/16     Weight: 72.9 kg (160 lb 11.5 oz)  Body mass index is 27.59 kg/m².    Intake/Output Summary (Last 24 hours) at 12/25/17 1931  Last data filed at 12/25/17 1900   Gross per  24 hour   Intake              910 ml   Output              800 ml   Net              110 ml      Physical Exam   Constitutional: She is oriented to person, place, and time. She appears well-nourished. No distress.   HENT:   Head: Normocephalic and atraumatic.   Mouth/Throat: Oropharynx is clear and moist. No oropharyngeal exudate.   Eyes: EOM are normal. Pupils are equal, round, and reactive to light.   Neck: Normal range of motion. Neck supple.   Cardiovascular: Normal rate, regular rhythm and normal heart sounds.    Pulmonary/Chest: Effort normal. No respiratory distress. She has rales.   Abdominal: Soft. Bowel sounds are normal.   Genitourinary:   Genitourinary Comments: Has hawkins   Musculoskeletal: Normal range of motion.   Neurological: She is alert and oriented to person, place, and time.   Skin: Skin is warm and dry. Capillary refill takes 2 to 3 seconds.   Psychiatric: She has a normal mood and affect.   Nursing note and vitals reviewed.      Significant Labs:   BMP:   Recent Labs  Lab 12/25/17  0412   *      K 3.5      CO2 24   BUN 33*   CREATININE 1.2   CALCIUM 8.4*   MG 1.9     CBC:   Recent Labs  Lab 12/24/17  0338 12/25/17  0412   WBC 5.10 7.15   HGB 11.7* 11.4*   HCT 35.8* 34.4*    226     All pertinent labs within the past 24 hours have been reviewed.    Significant Imaging: I have reviewed and interpreted all pertinent imaging results/findings within the past 24 hours.     Imaging Results          CTA Chest Non Coronary (Final result)  Result time 12/24/17 09:55:12    Final result by Hiram Metzger III, MD (12/24/17 09:55:12)                 Narrative:    CT angiogram of the chest.    Clinical indication: Hypoxemia. Sarcoidosis.    Standard and MIPS/3-D images submitted.    There are bilateral pleural effusions of a small to slightly moderate degree. There are small mediastinal nodes throughout of indeterminate significance and etiology. There is opacification of the  thoracic aorta without evidence of aneurysm formation or dissection. There is opacification of the pulmonary arterial system. There no gross filling defects within the major branches.    There are chronic lung changes bilaterally. Extensive groundglass infiltrates are noted bilaterally scattered throughout both lungs with partial consolidation posteriorly at the lung bases. There is also partial consolidation along the perihilar cysts or perihilar zones, especially on the right anteromedially involving the upper lobe.    The upper abdomen shows no acute abnormality.    1. Bilateral pleural effusions. Extensive bilateral infiltrates/pneumonia. Chronic lung changes.    2. Negative for acute thoracic aortic aneurysm or dissection. Negative for acute pulmonary emboli.          Electronically signed by: CARLOS DAVID MD  Date:     12/24/17  Time:    09:55                              X-Ray Chest 1 View (Final result)  Result time 12/23/17 08:55:16    Final result by Nick Merritt MD (12/23/17 08:55:16)                 Impression:     No significant change. Patchy bilateral infiltrates, left greater than right.      Electronically signed by: NICK MERRITT MD  Date:     12/23/17  Time:    08:55              Narrative:    History: Shortness of breath    Normal heart size. Patchy infiltrates throughout left lung appear unchanged from prior day. There is coarsening of bronchovascular markings bilaterally. Mild patchy infiltrates in the right mid and lower lung.                             X-Ray Chest AP Portable (Final result)  Result time 12/22/17 22:21:09    Final result by Nick Reed MD (12/22/17 22:21:09)                 Impression:         Patchy infiltrates throughout the left lung.      Electronically signed by: NICK REED MD  Date:     12/22/17  Time:    22:21              Narrative:    Exam: XR CHEST AP PORTABLE,    Date:  12/22/17 22:12:48    History: Sepsis.    Comparison:  No prior   studies available for comparison.    Findings:     Patchy infiltrates scattered about the left lung.  Question of a mild infiltrate right lung base.  Heart size within normal limits.  Vascular calcification of aorta.  No significant bony findings.                                Assessment/Plan:      * Acute respiratory failure with hypoxia and hypercarbia    Keep sats> 92%  NIPPV prn  Bronchodilators  Accapella  Solumedrol                Diabetes mellitus type 2, uncontrolled    Moderate sliding scale   Detemir increased to 15U QHS (home is 21 QHS)  a1c 7.1  Monitor   Diabetic diet           Acute CHF    Appreciate recs from ICU/pulm/cardiology  HFpEF exacerbation  ECHO reviewed  Responding with diuresis  Preload and afterload reduction  Lisinopril 40 mg          Elevated troponin    Cardiology consulted, appreciate recs  Trop now 3.316, trending down  Per Dr. Nolasco, Pt with atypical CP and (+) troponin. EKG is unremarkable and echo is normal. recommended continue IV diuresis, IV heparin x 48 hours, asa, plavix, statin, b blockers. Per cardiology, recommendations are for conservative therapy, lexiscan stress once diuresed.  Hep gtt, nitro  ointment  CTA completed, no PE          Pneumonia of left lower lobe due to infectious organism    Pulmonary consult, appreciate recs  CTA 12/24, images and radiologist interpretation and I personally reviewed   Bilateral infiltrates consistent with PNA  Empiric abx: Zyvox and Zosyn  Methylpred 40mg q6h  Sputum pending collection for culture  blood cultures NGTD 12/19  Swallow eval                  VTE Risk Mitigation         Ordered     Medium Risk of VTE  Once      12/22/17 2321     Place sequential compression device  Until discontinued      12/22/17 2321          Critical care time spent on the evaluation and treatment of severe organ dysfunction, review of pertinent labs and imaging studies, discussions with consulting providers and discussions with patient/family: 46  minutes.    Polly Garcia MD  Department of Hospital Medicine   [facility]

## 2017-12-26 NOTE — CONSULTS
"  [facility]   Adult Nutrition  Consult Note    SUMMARY     Recommendations  Recommendation/Intervention: 1. Advance diet when medically able to mechincal soft chopped meats, thin liquids  Goals: Advance diet within 48 hours or consider alternate nutrition  Nutrition Goal Status: new  Communication of RD Recs: discussed on rounds     Reason for Assessment  Reason for Assessment: identified at risk by screening criteria (dysphagia)  Diagnosis:  (acute respiratory failure)  Relevent Medical History: DM, HTN, IBS, see H&P   Interdisciplinary Rounds: attended  Nutrition Discharge Plan: Home on modified diet per SLP       Nutrition Prescription Ordered  Current Diet Order: NPO  % Intake of Estimated Energy Needs: 0 - 25 %  % Meal Intake: 0%     Nutrition Risk Screen     Nutrition Risk Screen: dysphagia or difficulty swallowing (at times)    Nutrition/Diet History          Food Preferences: none reported                         Labs/Tests/Procedures/Meds       Pertinent Labs Reviewed: reviewed     Pertinent Medications Reviewed: reviewed       Physical Findings          Oral/Mouth Cavity: WDL  Skin:  (Mit[place] 15)    Anthropometrics    Temp: 98.7 °F (37.1 °C)     Height: 5' 4" (162.6 cm)  Weight Method: Bed Scale  Weight: 74.3 kg (163 lb 12.8 oz)     Ideal Body Weight (IBW), Female: 120 lb     % Ideal Body Weight, Female (lb): 136.5 lb  BMI (Calculated): 28.2  BMI Grade: 25 - 29.9 - overweight                            Estimated/Assessed Needs    Weight Used For Calorie Calculations: 74.3 kg (163 lb 12.8 oz)      Energy Calorie Requirements (kcal): 1371 calories  Energy Need Method: [place]-St Jeor (x1.2)       Weight Used For Protein Calculations: 74.3 kg (163 lb 12.8 oz)     1.0 gm Protein (gm): 74.46              RDA Method (mL): 1371               Assessment and Plan  No new Assessment & Plan notes have been filed under this hospital service since the last note was generated.  Service: " Nutrition    Nutrition Problem  suboptimal oral intake    Related to (etiology):   Swallowing difficuty    Signs and Symptoms (as evidenced by):   Need for modified diet     Interventions/Recommendations (treatment strategy):  1. Advance diet per SLP recommendations    Nutrition Diagnosis Status:   New        Monitor and Evaluation    Food and Nutrient Intake: food and beverage intake  Food and Nutrient Adminstration: diet order     Physical Activity and Function: nutrition-related ADLs and IADLs       Nutrition Follow-Up  RD Follow-up?: Yes (1x weekly)

## 2017-12-26 NOTE — PLAN OF CARE
Problem: Patient Care Overview  Goal: Plan of Care Review  Outcome: Ongoing (interventions implemented as appropriate)  Pt on vapotherm at this time, tolerating well. No distress noted at this time.

## 2017-12-26 NOTE — SUBJECTIVE & OBJECTIVE
Review of Systems   Unable to perform ROS: Severe respiratory distress   Constitutional: Positive for fatigue. Negative for chills and fever.   Eyes: Negative.    Respiratory: Positive for cough, chest tightness and shortness of breath.    Cardiovascular: Negative for chest pain, palpitations and leg swelling.   Gastrointestinal: Negative.    Endocrine: Negative.    Genitourinary: Negative.    Neurological: Negative.    Psychiatric/Behavioral: Negative.      Objective:     Vital Signs (Most Recent):  Temp: 98.4 °F (36.9 °C) (12/25/17 1905)  Pulse: 63 (12/25/17 1907)  Resp: 14 (12/25/17 1907)  BP: (!) 152/16 (12/25/17 1907)  SpO2: (!) 94 % (12/25/17 1907) Vital Signs (24h Range):  Temp:  [98 °F (36.7 °C)-98.4 °F (36.9 °C)] 98.4 °F (36.9 °C)  Pulse:  [58-73] 63  Resp:  [13-22] 14  SpO2:  [90 %-98 %] 94 %  BP: (130-187)/() 152/16     Weight: 72.9 kg (160 lb 11.5 oz)  Body mass index is 27.59 kg/m².    Intake/Output Summary (Last 24 hours) at 12/25/17 1931  Last data filed at 12/25/17 1900   Gross per 24 hour   Intake              910 ml   Output              800 ml   Net              110 ml      Physical Exam   Constitutional: She is oriented to person, place, and time. She appears well-nourished. No distress.   HENT:   Head: Normocephalic and atraumatic.   Mouth/Throat: Oropharynx is clear and moist. No oropharyngeal exudate.   Eyes: EOM are normal. Pupils are equal, round, and reactive to light.   Neck: Normal range of motion. Neck supple.   Cardiovascular: Normal rate, regular rhythm and normal heart sounds.    Pulmonary/Chest: Effort normal. No respiratory distress. She has rales.   Abdominal: Soft. Bowel sounds are normal.   Genitourinary:   Genitourinary Comments: Has hawkins   Musculoskeletal: Normal range of motion.   Neurological: She is alert and oriented to person, place, and time.   Skin: Skin is warm and dry. Capillary refill takes 2 to 3 seconds.   Psychiatric: She has a normal mood and affect.    Nursing note and vitals reviewed.      Significant Labs:   BMP:   Recent Labs  Lab 12/25/17  0412   *      K 3.5      CO2 24   BUN 33*   CREATININE 1.2   CALCIUM 8.4*   MG 1.9     CBC:   Recent Labs  Lab 12/24/17  0338 12/25/17  0412   WBC 5.10 7.15   HGB 11.7* 11.4*   HCT 35.8* 34.4*    226     All pertinent labs within the past 24 hours have been reviewed.    Significant Imaging: I have reviewed and interpreted all pertinent imaging results/findings within the past 24 hours.     Imaging Results          CTA Chest Non Coronary (Final result)  Result time 12/24/17 09:55:12    Final result by Hiram Metzger III, MD (12/24/17 09:55:12)                 Narrative:    CT angiogram of the chest.    Clinical indication: Hypoxemia. Sarcoidosis.    Standard and MIPS/3-D images submitted.    There are bilateral pleural effusions of a small to slightly moderate degree. There are small mediastinal nodes throughout of indeterminate significance and etiology. There is opacification of the thoracic aorta without evidence of aneurysm formation or dissection. There is opacification of the pulmonary arterial system. There no gross filling defects within the major branches.    There are chronic lung changes bilaterally. Extensive groundglass infiltrates are noted bilaterally scattered throughout both lungs with partial consolidation posteriorly at the lung bases. There is also partial consolidation along the perihilar cysts or perihilar zones, especially on the right anteromedially involving the upper lobe.    The upper abdomen shows no acute abnormality.    1. Bilateral pleural effusions. Extensive bilateral infiltrates/pneumonia. Chronic lung changes.    2. Negative for acute thoracic aortic aneurysm or dissection. Negative for acute pulmonary emboli.          Electronically signed by: HIRAM METZGER MD  Date:     12/24/17  Time:    09:55                              X-Ray Chest 1 View (Final  result)  Result time 12/23/17 08:55:16    Final result by Nick Wilson MD (12/23/17 08:55:16)                 Impression:     No significant change. Patchy bilateral infiltrates, left greater than right.      Electronically signed by: NICK WILSON MD  Date:     12/23/17  Time:    08:55              Narrative:    History: Shortness of breath    Normal heart size. Patchy infiltrates throughout left lung appear unchanged from prior day. There is coarsening of bronchovascular markings bilaterally. Mild patchy infiltrates in the right mid and lower lung.                             X-Ray Chest AP Portable (Final result)  Result time 12/22/17 22:21:09    Final result by Nick Reed MD (12/22/17 22:21:09)                 Impression:         Patchy infiltrates throughout the left lung.      Electronically signed by: NICK REED MD  Date:     12/22/17  Time:    22:21              Narrative:    Exam: XR CHEST AP PORTABLE,    Date:  12/22/17 22:12:48    History: Sepsis.    Comparison:  No prior  studies available for comparison.    Findings:     Patchy infiltrates scattered about the left lung.  Question of a mild infiltrate right lung base.  Heart size within normal limits.  Vascular calcification of aorta.  No significant bony findings.

## 2017-12-26 NOTE — PROGRESS NOTES
[facility]   Cardiology  Progress Note    Patient Name: Olivia Reyes  MRN: [id number]  Admission Date: 12/22/2017  Hospital Length of Stay: 4 days  Code Status: DNR   Attending Physician: Polly Garcia MD   Primary Care Physician: No primary care provider on file.  Expected Discharge Date:   Principal Problem:Acute respiratory failure with hypoxia and hypercarbia    Subjective:     Hospital Course:   12/26/17-Patient seen and examined today, resting in bed. States SOB is improving. Complains of pleuritic chest pain, worse with coughing. Troponin trending down. Creatine stable, K slightly low at 3.1. EKG changes have resolved.     Interval History: Improving, feels better this AM.     Review of Systems   Constitution: Positive for weakness and malaise/fatigue.   HENT: Negative.    Eyes: Negative.    Cardiovascular: Positive for dyspnea on exertion.        Pleuritic chest discomfort     Respiratory: Positive for cough, shortness of breath and sputum production.    Endocrine: Negative.    Skin: Negative.    Musculoskeletal: Negative.    Gastrointestinal: Negative.    Genitourinary: Negative.    Psychiatric/Behavioral: Negative.      Objective:     Vital Signs (Most Recent):  Temp: 98.7 °F (37.1 °C) (12/26/17 0704)  Pulse: (!) 55 (12/26/17 1104)  Resp: 16 (12/26/17 1104)  BP: (!) 168/44 (12/26/17 1104)  SpO2: (!) 94 % (12/26/17 1104) Vital Signs (24h Range):  Temp:  [98 °F (36.7 °C)-98.7 °F (37.1 °C)] 98.7 °F (37.1 °C)  Pulse:  [55-69] 55  Resp:  [10-22] 16  SpO2:  [89 %-95 %] 94 %  BP: ()/() 168/44     Weight: 74.3 kg (163 lb 12.8 oz)  Body mass index is 28.12 kg/m².     SpO2: (!) 94 %  O2 Device (Oxygen Therapy): Vapotherm      Intake/Output Summary (Last 24 hours) at 12/26/17 1158  Last data filed at 12/26/17 0600   Gross per 24 hour   Intake              720 ml   Output              535 ml   Net              185 ml       Lines/Drains/Airways     Peripheral Intravenous Line                  Peripheral IV - Single Lumen 12/22/17 Right Forearm 4 days         Peripheral IV - Single Lumen 12/22/17 2210 Left Forearm 3 days                Physical Exam   Constitutional: She is oriented to person, place, and time. She appears well-developed and well-nourished. No distress.   On  O2   HENT:   Head: Normocephalic and atraumatic.   Eyes: Pupils are equal, round, and reactive to light. Right eye exhibits no discharge. Left eye exhibits no discharge.   Neck: Neck supple. No JVD present. No tracheal deviation present. No thyromegaly present.   Cardiovascular: Normal rate, regular rhythm, S1 normal, S2 normal and normal heart sounds.  PMI is not displaced.  Exam reveals no gallop, no S3, no S4 and no friction rub.    No murmur heard.  Pulses:       Radial pulses are 2+ on the right side, and 2+ on the left side.   Pulmonary/Chest: Effort normal and breath sounds normal. No respiratory distress. She has no wheezes. Bibasilar crackles   Coarse rhonchi throughout   Abdominal: Soft. She exhibits no distension. There is no tenderness. There is no rebound.   Musculoskeletal: She exhibits no edema.   Neurological: She is alert and oriented to person, place, and time.   Skin: Skin is warm and dry. She is not diaphoretic. No erythema.   Psychiatric: She has a normal mood and affect. Her behavior is normal. Thought content normal.   Nursing note and vitals reviewed.      Significant Labs:   Blood Culture: No results for input(s): LABBLOO in the last 48 hours., CMP   Recent Labs  Lab 12/25/17 0412 12/26/17 0359    138   K 3.5 3.1*    100   CO2 24 24   * 235*   BUN 33* 44*   CREATININE 1.2 1.3   CALCIUM 8.4* 8.4*   ANIONGAP 13 14   ESTGFRAFRICA 46* 41*   EGFRNONAA 40* 36*   , CBC   Recent Labs  Lab 12/25/17 0412 12/26/17  0359   WBC 7.15 14.18*   HGB 11.4* 12.2   HCT 34.4* 36.2*    256    and Troponin No results for input(s): TROPONINI in the last 48 hours.    Significant Imaging: CXR  reviewed, EKG this AM reviewed    Assessment and Plan:     Brief HPI: Patient who presents with NSTEMI in setting of acute respiratory failure/pneumonia. Clinically improving. Stable CV wise. Discussed with patient and family, she is agreeable to medical management only. No invasive procedures, including LHC. Risks/benefits explained in detail.     * Acute respiratory failure with hypoxia and hypercarbia    Continue diuretics,dc  IV heparin, continueasa,plavix, nitrates        Acute CHF    -Improved  -Continue BP medications and IV lasix as needed        NSTEMI (non-ST elevated myocardial infarction)    -Secondary to demand ischemia from pneumonia/respiratory failure  -Discussed with family and patient, she is agreeable to medical management, does not wish for any invasive procedures, including LHC  -Continue ASA, Plavix, norvasc, Lisinopril, statin  -D/C nitropaste, start Imdur 30 mg daily  -Consider BB, will hold off for time being given bradycardia noted this AM              VTE Risk Mitigation         Ordered     heparin (porcine) injection 5,000 Units  Every 8 hours     Route:  Subcutaneous        12/26/17 1000     Medium Risk of VTE  Once      12/22/17 2321     Place sequential compression device  Until discontinued      12/22/17 2321          Berenice Samuels PA-C  Cardiology  [facility]    Chart reviewed. Dr. Rodriguez examined patient and agrees with plan as outlined above.

## 2017-12-26 NOTE — PROGRESS NOTES
[facility]  Cardiology  Progress Note    Patient Name: Olivia Reyes  MRN: [id number]  Admission Date: 12/22/2017  Hospital Length of Stay: 3 days  Code Status: DNR   Attending Physician: Polly Garcia MD   Primary Care Physician: No primary care provider on file.  Expected Discharge Date:   Principal Problem:Acute respiratory failure with hypoxia and hypercarbia    Subjective:     Hospital Course:   Diuresing well, still mild  CHF this morning Uout 460 cc    Interval History: V fib    Review of Systems   Constitution: Negative. Negative for weight gain.   HENT: Negative.    Eyes: Negative.    Cardiovascular: Negative.  Negative for chest pain, leg swelling and palpitations.   Respiratory: Negative.  Negative for shortness of breath.    Endocrine: Negative.    Hematologic/Lymphatic: Negative.    Skin: Negative.    Musculoskeletal: Negative for muscle weakness.   Gastrointestinal: Negative.    Genitourinary: Negative.    Neurological: Negative.  Negative for dizziness.   Psychiatric/Behavioral: Negative.    Allergic/Immunologic: Negative.      Objective:     Vital Signs (Most Recent):  Temp: 98.4 °F (36.9 °C) (12/25/17 1905)  Pulse: 63 (12/25/17 1907)  Resp: 14 (12/25/17 1907)  BP: (!) 152/16 (12/25/17 1907)  SpO2: (!) 94 % (12/25/17 1907) Vital Signs (24h Range):  Temp:  [98 °F (36.7 °C)-98.4 °F (36.9 °C)] 98.4 °F (36.9 °C)  Pulse:  [58-73] 63  Resp:  [13-22] 14  SpO2:  [90 %-98 %] 94 %  BP: (130-187)/() 152/16     Weight: 72.9 kg (160 lb 11.5 oz)  Body mass index is 27.59 kg/m².     SpO2: (!) 94 %  O2 Device (Oxygen Therapy): Vapotherm      Intake/Output Summary (Last 24 hours) at 12/25/17 2016  Last data filed at 12/25/17 2000   Gross per 24 hour   Intake              910 ml   Output              795 ml   Net              115 ml       Lines/Drains/Airways     Drain                 Urethral Catheter 12/22/17 2230 Non-latex 16 Fr. 2 days          Peripheral Intravenous Line                  Peripheral IV - Single Lumen 12/22/17 Right Forearm 3 days         Peripheral IV - Single Lumen 12/22/17 2210 Left Forearm 2 days                Physical Exam   Constitutional: She is oriented to person, place, and time. She appears well-developed and well-nourished.   HENT:   Head: Normocephalic and atraumatic.   Eyes: Conjunctivae and EOM are normal. Pupils are equal, round, and reactive to light.   Neck: Normal range of motion. Neck supple.   Cardiovascular: Normal rate, regular rhythm, normal heart sounds and intact distal pulses.    Pulmonary/Chest: Effort normal. Decreased breath sounds: B bases, course.   Abdominal: Soft. Bowel sounds are normal.   Musculoskeletal: Normal range of motion.   Neurological: She is alert and oriented to person, place, and time.   Skin: Skin is warm and dry.   Psychiatric: She has a normal mood and affect.   Nursing note and vitals reviewed.      Significant Labs: All pertinent lab results from the last 24 hours have been reviewed.    Significant Imaging: X-Ray: CXR: X-Ray Chest 1 View (CXR):   Results for orders placed or performed during the hospital encounter of 12/22/17   X-Ray Chest 1 View    Narrative    History: Shortness of breath    Normal heart size. Patchy infiltrates throughout left lung appear unchanged from prior day. There is coarsening of bronchovascular markings bilaterally. Mild patchy infiltrates in the right mid and lower lung.    Impression     No significant change. Patchy bilateral infiltrates, left greater than right.      Electronically signed by: IRIS MERRITT MD  Date:     12/23/17  Time:    08:55      Assessment and Plan:     Brief HPI: SOB    * Acute respiratory failure with hypoxia and hypercarbia    Continue diuretics,dc  IV heparin, continueasa,plavix, nitrates            VTE Risk Mitigation         Ordered     Medium Risk of VTE  Once      12/22/17 2321     Place sequential compression device  Until discontinued      12/22/17 2321           Nathan Nolasco MD  Cardiology  [facility]

## 2017-12-26 NOTE — PROGRESS NOTES
Patient blood pressure 182/68 in left arm. Blood pressure rechecked in right arm and is now 164/26. Patient has no complaints at this time. No distress noted. Prn hydralazine 10mg administered per order. Will continue to monitor.

## 2017-12-26 NOTE — PT/OT/SLP EVAL
Speech Language Pathology Evaluation  Bedside Swallow    Patient Name:  Olivia Reyes   MRN:  [id number]  Admitting Diagnosis: Acute respiratory failure with hypoxia and hypercarbia    Recommendations:                 General Recommendations:  Dysphagia therapy  Diet recommendations:  Mechanical soft, Chopped meat, Thin   Aspiration Precautions: 1 bite/sip at a time, Alternating bites/sips, Double swallow with each bite/sip, HOB to 90 degrees, Remain upright 30 minutes post meal and Small bites/sips   General Precautions: Standard, aspiration  Communication strategies:  none    History:     Past Medical History:   Diagnosis Date    Diabetes mellitus     High cholesterol     Hypertension     Hypothyroid     Irritable bowel disease     Neuropathy     Osteoporosis     Sarcoidosis        Past Surgical History:   Procedure Laterality Date    CHOLECYSTECTOMY      HYSTERECTOMY         Social History: Patient lives with her daughter.    Prior Intubation HX:      Modified Barium Swallow: Pt had esophagram about a year ago which revealed dysmotility of esophagus. Pt has utilized strategies while eating since then to decrease risk of retrograde flow into her pharynx.     Chest X-Rays:    Prior diet: regular    Occupation/hobbies/homemaking:     Subjective     Pt cooperative  Patient goals: to eat    Objective:     Oral Musculature Evaluation  · Oral Musculature: general weakness  · Dentition: present and adequate  · Mucosal Quality: good    Bedside Swallow Eval:   Consistencies Assessed:  · Thin liquids via straw  · Soft solids bites of cracker   Swallow assessed at bedside. She has decreased laryngeal elevation and history of esophageal dysphagia. There were no overt s/s of aspiration during assessment, but pt is at increased risk of aspiration.   Oral Phase:   · WFL    Pharyngeal Phase:   · decreased hyolaryngeal excursion to palpation    Compensatory Strategies  · None    Treatment:     Assessment:     Olivia Reyes  is a 89 y.o. female with an SLP diagnosis of Dysphagia.  She presents with risk of aspiration.    Goals:    SLP Goals        Problem: SLP Goal    Goal Priority Disciplines Outcome   SLP Goal     SLP    Description:  1. Pt will tolerate least restrictive diet without s/s of aspiration.  2. Pt will complete laryngeal elevation ex x 20 each with min cues to improve swallow function.                     Plan:     · Patient to be seen:  3 x/week   · Plan of Care expires:  01/02/18  · Plan of Care reviewed with:  patient   · SLP Follow-Up:  Yes       Discharge recommendations:      Barriers to Discharge:  None    Time Tracking:     SLP Treatment Date:   12/26/17  Speech Start Time:  0933  Speech Stop Time:  0951     Speech Total Time (min):  18 min    Billable Minutes: Eval Swallow and Oral Function 18    Keisha Hu CCC-SLP  12/26/2017

## 2017-12-26 NOTE — SUBJECTIVE & OBJECTIVE
Interval History: V fib    Review of Systems   Constitution: Negative. Negative for weight gain.   HENT: Negative.    Eyes: Negative.    Cardiovascular: Negative.  Negative for chest pain, leg swelling and palpitations.   Respiratory: Negative.  Negative for shortness of breath.    Endocrine: Negative.    Hematologic/Lymphatic: Negative.    Skin: Negative.    Musculoskeletal: Negative for muscle weakness.   Gastrointestinal: Negative.    Genitourinary: Negative.    Neurological: Negative.  Negative for dizziness.   Psychiatric/Behavioral: Negative.    Allergic/Immunologic: Negative.      Objective:     Vital Signs (Most Recent):  Temp: 98.4 °F (36.9 °C) (12/25/17 1905)  Pulse: 63 (12/25/17 1907)  Resp: 14 (12/25/17 1907)  BP: (!) 152/16 (12/25/17 1907)  SpO2: (!) 94 % (12/25/17 1907) Vital Signs (24h Range):  Temp:  [98 °F (36.7 °C)-98.4 °F (36.9 °C)] 98.4 °F (36.9 °C)  Pulse:  [58-73] 63  Resp:  [13-22] 14  SpO2:  [90 %-98 %] 94 %  BP: (130-187)/() 152/16     Weight: 72.9 kg (160 lb 11.5 oz)  Body mass index is 27.59 kg/m².     SpO2: (!) 94 %  O2 Device (Oxygen Therapy): Vapotherm      Intake/Output Summary (Last 24 hours) at 12/25/17 2016  Last data filed at 12/25/17 2000   Gross per 24 hour   Intake              910 ml   Output              795 ml   Net              115 ml       Lines/Drains/Airways     Drain                 Urethral Catheter 12/22/17 2230 Non-latex 16 Fr. 2 days          Peripheral Intravenous Line                 Peripheral IV - Single Lumen 12/22/17 Right Forearm 3 days         Peripheral IV - Single Lumen 12/22/17 2210 Left Forearm 2 days                Physical Exam   Constitutional: She is oriented to person, place, and time. She appears well-developed and well-nourished.   HENT:   Head: Normocephalic and atraumatic.   Eyes: Conjunctivae and EOM are normal. Pupils are equal, round, and reactive to light.   Neck: Normal range of motion. Neck supple.   Cardiovascular: Normal rate,  regular rhythm, normal heart sounds and intact distal pulses.    Pulmonary/Chest: Effort normal. Decreased breath sounds: B bases, course.   Abdominal: Soft. Bowel sounds are normal.   Musculoskeletal: Normal range of motion.   Neurological: She is alert and oriented to person, place, and time.   Skin: Skin is warm and dry.   Psychiatric: She has a normal mood and affect.   Nursing note and vitals reviewed.      Significant Labs: All pertinent lab results from the last 24 hours have been reviewed.    Significant Imaging: X-Ray: CXR: X-Ray Chest 1 View (CXR):   Results for orders placed or performed during the hospital encounter of 12/22/17   X-Ray Chest 1 View    Narrative    History: Shortness of breath    Normal heart size. Patchy infiltrates throughout left lung appear unchanged from prior day. There is coarsening of bronchovascular markings bilaterally. Mild patchy infiltrates in the right mid and lower lung.    Impression     No significant change. Patchy bilateral infiltrates, left greater than right.      Electronically signed by: IRIS MERRITT MD  Date:     12/23/17  Time:    08:55

## 2017-12-26 NOTE — PROGRESS NOTES
"[facility] Hospital Medicine  Progress Note    Patient Name: Olivia Reyes  MRN: [id number]  Patient Class: IP- Inpatient   Admission Date: 12/22/2017  Length of Stay: 4 days  Attending Physician: Polly Garcia MD  Primary Care Provider: No primary care provider on file.        Subjective:     Principal Problem:Acute respiratory failure with hypoxia and hypercarbia    HPI:  Olivia Reyes is a 89 y.o. female patient who presented to the Emergency Department for SOB. Onset gradually 1 week ago. Symptoms constant and worsening. Patient reports over past week that she has been noticing increaed SOB with excursion. Prior treatment neb, evaluation at [facility] after hours, and 1 doxycycline. The  states that the pt "deteriorated 3 hours later", which prompted the ED visit.  No mitigating or exacerbating factors reported. Associated sxs include chest pain and "low grade" fever (99.5). En route, pt was placed on CPAP by EMS.  denies any diaphoresis, dizziness, focal weakness/ numbness, cough, palpitations, leg swelling, n/v/d, and all other sxs at this time. No further complaints or concerns at this time. The patient was evaluated in the Er, CBC neg, CMP notable for hypergycemia and CO2 20. Troponin and BNP elevated. UA neg. ABGs pH7.315, CO2 52.3, PO2 70. Chest Xray: Patchy infiltrates scattered about the left lung.  Question of a mild infiltrate right lung base.  Heart size within normal limits.  Vascular calcification of aorta.  No significant bony findings. The patient was placed on Bipap in ER with improvement in symptoms per patient. The patient admitted to ICU with Bipap for Acute Resp Failure with hypoxemia and hypercap.     Hospital Course:  89 y/u female with PMHx with PMhx for HTN, HLP, DM presents with CHF and possible pneumonia. Troponin trended up and cardiology consulted.   Per Dr. Nolasco, patient with atypical CP and (+) troponin. EKG is unremarkable and echo is normal. " Cardiology recommended continue IV diuresis, IV heparin x 48 hours, asa, plavix, statin, b blockers. Recommendations are for conservative therapy, lexiscan stress once diuresed.  12/24 - CTA today revealed small to moderate bilateral pleural effusions. Extensive bilateral infiltrates/pneumonia. Chronic lung changes. Negative for acute thoracic aortic aneurysm or dissection. Negative for acute pulmonary emboli. On BiPap. Moxi Iv. Acetylcysteine nebs. Hypomagnesemia and hypokalemia. Replacing. BP uncontrolled, increasing amlodipine from 5 mg to 10mg daily. Patient continues on lasix 40mg IV BID with I/O -2472 over last 24 hours. Troponin peaked and now downtrending. Remains on heparin gtt with nitro ointment  12/25 - Patient adequately diuresed and is now having mild RENA. D/c lasix. Hyperglycemia uncontrolled. Increased insulin. Remains in ICU in critical condition. Continuing to treat HF and PNA aggressively.   12/26 - Elevated troponin is from NSTEMI secondary to demand ischemia from pneumonia/respiratory failure. Patient is agreeable to medical management, does not wish for any invasive procedures, including LHC. She is to continue on ASA, Plavix, norvasc, Lisinopril, statin. Will D/C nitropaste, start Imdur 30 mg daily. Will consider BB, will hold off for time being given bradycardia noted this AM.           Review of Systems   Unable to perform ROS: Severe respiratory distress   Constitutional: Positive for fatigue. Negative for chills and fever.   Eyes: Negative.    Respiratory: Positive for cough, chest tightness and shortness of breath.    Cardiovascular: Negative for chest pain, palpitations and leg swelling.   Gastrointestinal: Negative.    Endocrine: Negative.    Genitourinary: Negative.    Neurological: Negative.    Psychiatric/Behavioral: Negative.      Objective:     Vital Signs (Most Recent):  Temp: 98.7 °F (37.1 °C) (12/26/17 0704)  Pulse: (!) 53 (12/26/17 1200)  Resp: 14 (12/26/17 1200)  BP: (!)  174/34 (12/26/17 1200)  SpO2: (!) 91 % (12/26/17 1200) Vital Signs (24h Range):  Temp:  [98 °F (36.7 °C)-98.7 °F (37.1 °C)] 98.7 °F (37.1 °C)  Pulse:  [53-69] 53  Resp:  [10-22] 14  SpO2:  [89 %-96 %] 91 %  BP: ()/() 174/34     Weight: 74.3 kg (163 lb 12.8 oz)  Body mass index is 28.12 kg/m².    Intake/Output Summary (Last 24 hours) at 12/26/17 1530  Last data filed at 12/26/17 0800   Gross per 24 hour   Intake              620 ml   Output              515 ml   Net              105 ml      Physical Exam   Constitutional: She is oriented to person, place, and time. She appears well-nourished.   HENT:   Head: Normocephalic and atraumatic.   Mouth/Throat: Oropharynx is clear and moist. No oropharyngeal exudate.   Eyes: EOM are normal. Pupils are equal, round, and reactive to light.   Neck: Normal range of motion. Neck supple.   Cardiovascular: Normal rate, regular rhythm and normal heart sounds.    Pulmonary/Chest: Effort normal. She has rales.   Abdominal: Soft. Bowel sounds are normal.   Genitourinary:   Genitourinary Comments: Has hawkins   Musculoskeletal: Normal range of motion.   Neurological: She is alert and oriented to person, place, and time.   Skin: Skin is warm and dry. Capillary refill takes 2 to 3 seconds.   Psychiatric: She has a normal mood and affect.   Nursing note and vitals reviewed.      Significant Labs:   BMP:   Recent Labs  Lab 12/26/17  0359   *      K 3.1*      CO2 24   BUN 44*   CREATININE 1.3   CALCIUM 8.4*   MG 2.0     CBC:   Recent Labs  Lab 12/25/17  0412 12/26/17  0359   WBC 7.15 14.18*   HGB 11.4* 12.2   HCT 34.4* 36.2*    256     All pertinent labs within the past 24 hours have been reviewed.    Significant Imaging: I have reviewed and interpreted all pertinent imaging results/findings within the past 24 hours.    Assessment/Plan:      * Acute respiratory failure with hypoxia and hypercarbia    Keep sats> 92%  NIPPV  prn  Bronchodilators  Accapella  Solumedrol d/c               Diabetes mellitus type 2, uncontrolled    Moderate sliding scale   Detemir 15U QHS (home is 21 QHS)  a1c 7.1  Monitor   Diabetic diet           Acute CHF    Appreciate recs from ICU/pulm/cardiology  HFpEF exacerbation  ECHO reviewed  Responding with diuresis  Preload and afterload reduction  Lisinopril 40 mg          NSTEMI (non-ST elevated myocardial infarction)    Cardiology consulted, appreciate recs  Trop trended down  asa, plavix, statin, imdur  b blockers on hold for relative bradycardia.   Per cardiology, recommendations are for conservative therapy, patient would not desire aggressive management with [facility] and we will continue medical management            Pneumonia of left lower lobe due to infectious organism    Pulmonary consult, appreciate recs  CTA 12/24, images and radiologist interpretation and I personally reviewed   Bilateral infiltrates consistent with PNA  Empiric abx: Zyvox and Zosyn  Methylpred 40mg q6h has been d/c  Sputum culture  blood cultures NGTD 12/19  Swallow eval                  VTE Risk Mitigation         Ordered     heparin (porcine) injection 5,000 Units  Every 8 hours     Route:  Subcutaneous        12/26/17 1000     Medium Risk of VTE  Once      12/22/17 2321     Place sequential compression device  Until discontinued      12/22/17 2321          Critical care time spent on the evaluation and treatment of severe organ dysfunction, review of pertinent labs and imaging studies, discussions with consulting providers and discussions with patient/family: 65 minutes.    Polly Garcia MD  Department of Hospital Medicine   [facility]

## 2017-12-26 NOTE — SUBJECTIVE & OBJECTIVE
Interval History: Doing well, feels better this AM. Complains of pleuritic chest pain s/p CPT. Still having cough with sputum production. Troponin trending down.    Review of Systems   Constitution: Positive for weakness and malaise/fatigue.   HENT: Negative.    Eyes: Negative.    Cardiovascular: Positive for dyspnea on exertion.        Pleuritic chest discomfort     Respiratory: Positive for cough, shortness of breath and sputum production.    Endocrine: Negative.    Skin: Negative.    Musculoskeletal: Negative.    Gastrointestinal: Negative.    Genitourinary: Negative.    Psychiatric/Behavioral: Negative.      Objective:     Vital Signs (Most Recent):  Temp: 98.7 °F (37.1 °C) (12/26/17 0704)  Pulse: (!) 55 (12/26/17 1104)  Resp: 16 (12/26/17 1104)  BP: (!) 168/44 (12/26/17 1104)  SpO2: (!) 94 % (12/26/17 1104) Vital Signs (24h Range):  Temp:  [98 °F (36.7 °C)-98.7 °F (37.1 °C)] 98.7 °F (37.1 °C)  Pulse:  [55-69] 55  Resp:  [10-22] 16  SpO2:  [89 %-95 %] 94 %  BP: ()/() 168/44     Weight: 74.3 kg (163 lb 12.8 oz)  Body mass index is 28.12 kg/m².     SpO2: (!) 94 %  O2 Device (Oxygen Therapy): Vapotherm      Intake/Output Summary (Last 24 hours) at 12/26/17 1158  Last data filed at 12/26/17 0600   Gross per 24 hour   Intake              720 ml   Output              535 ml   Net              185 ml       Lines/Drains/Airways     Peripheral Intravenous Line                 Peripheral IV - Single Lumen 12/22/17 Right Forearm 4 days         Peripheral IV - Single Lumen 12/22/17 2210 Left Forearm 3 days                Physical Exam   Constitutional: She is oriented to person, place, and time. She appears well-developed and well-nourished. No distress.   On  O2   HENT:   Head: Normocephalic and atraumatic.   Eyes: Pupils are equal, round, and reactive to light. Right eye exhibits no discharge. Left eye exhibits no discharge.   Neck: Neck supple. No JVD present. No tracheal deviation present. No thyromegaly  present.   Cardiovascular: Normal rate, regular rhythm, S1 normal, S2 normal and normal heart sounds.  PMI is not displaced.  Exam reveals no gallop, no S3, no S4 and no friction rub.    No murmur heard.  Pulses:       Radial pulses are 2+ on the right side, and 2+ on the left side.   Pulmonary/Chest: Effort normal and breath sounds normal. No respiratory distress. She has no wheezes. She has no rales.   Coarse rhonchi throughout   Abdominal: Soft. She exhibits no distension. There is no tenderness. There is no rebound.   Musculoskeletal: She exhibits no edema.   Neurological: She is alert and oriented to person, place, and time.   Skin: Skin is warm and dry. She is not diaphoretic. No erythema.   Psychiatric: She has a normal mood and affect. Her behavior is normal. Thought content normal.   Nursing note and vitals reviewed.      Significant Labs:   Blood Culture: No results for input(s): LABBLOO in the last 48 hours., CMP   Recent Labs  Lab 12/25/17 0412 12/26/17  0359    138   K 3.5 3.1*    100   CO2 24 24   * 235*   BUN 33* 44*   CREATININE 1.2 1.3   CALCIUM 8.4* 8.4*   ANIONGAP 13 14   ESTGFRAFRICA 46* 41*   EGFRNONAA 40* 36*   , CBC   Recent Labs  Lab 12/25/17 0412 12/26/17  0359   WBC 7.15 14.18*   HGB 11.4* 12.2   HCT 34.4* 36.2*    256    and Troponin No results for input(s): TROPONINI in the last 48 hours.    Significant Imaging: CXR reviewed, EKG this AM reviewed

## 2017-12-26 NOTE — ASSESSMENT & PLAN NOTE
Supplement oxygenation. High Flow Oxygen.  Keep SAO2 >= 92%. BIPAP 10/5 QHS and PRN. Bronchodilators per orders. SWITCH TO ORAL PREDNISONE.

## 2017-12-26 NOTE — SUBJECTIVE & OBJECTIVE
Review of Systems   Unable to perform ROS: Severe respiratory distress   Constitutional: Positive for fatigue. Negative for chills and fever.   Eyes: Negative.    Respiratory: Positive for cough, chest tightness and shortness of breath.    Cardiovascular: Negative for chest pain, palpitations and leg swelling.   Gastrointestinal: Negative.    Endocrine: Negative.    Genitourinary: Negative.    Neurological: Negative.    Psychiatric/Behavioral: Negative.      Objective:     Vital Signs (Most Recent):  Temp: 98.7 °F (37.1 °C) (12/26/17 0704)  Pulse: (!) 53 (12/26/17 1200)  Resp: 14 (12/26/17 1200)  BP: (!) 174/34 (12/26/17 1200)  SpO2: (!) 91 % (12/26/17 1200) Vital Signs (24h Range):  Temp:  [98 °F (36.7 °C)-98.7 °F (37.1 °C)] 98.7 °F (37.1 °C)  Pulse:  [53-69] 53  Resp:  [10-22] 14  SpO2:  [89 %-96 %] 91 %  BP: ()/() 174/34     Weight: 74.3 kg (163 lb 12.8 oz)  Body mass index is 28.12 kg/m².    Intake/Output Summary (Last 24 hours) at 12/26/17 1530  Last data filed at 12/26/17 0800   Gross per 24 hour   Intake              620 ml   Output              515 ml   Net              105 ml      Physical Exam   Constitutional: She is oriented to person, place, and time. She appears well-nourished.   HENT:   Head: Normocephalic and atraumatic.   Mouth/Throat: Oropharynx is clear and moist. No oropharyngeal exudate.   Eyes: EOM are normal. Pupils are equal, round, and reactive to light.   Neck: Normal range of motion. Neck supple.   Cardiovascular: Normal rate, regular rhythm and normal heart sounds.    Pulmonary/Chest: Effort normal. She has rales.   Abdominal: Soft. Bowel sounds are normal.   Genitourinary:   Genitourinary Comments: Has hawkins   Musculoskeletal: Normal range of motion.   Neurological: She is alert and oriented to person, place, and time.   Skin: Skin is warm and dry. Capillary refill takes 2 to 3 seconds.   Psychiatric: She has a normal mood and affect.   Nursing note and vitals  reviewed.      Significant Labs:   BMP:   Recent Labs  Lab 12/26/17  0359   *      K 3.1*      CO2 24   BUN 44*   CREATININE 1.3   CALCIUM 8.4*   MG 2.0     CBC:   Recent Labs  Lab 12/25/17  0412 12/26/17  0359   WBC 7.15 14.18*   HGB 11.4* 12.2   HCT 34.4* 36.2*    256     All pertinent labs within the past 24 hours have been reviewed.    Significant Imaging: I have reviewed and interpreted all pertinent imaging results/findings within the past 24 hours.

## 2017-12-26 NOTE — ASSESSMENT & PLAN NOTE
-Secondary to demand ischemia from pneumonia/respiratory failure  -Discussed with family and patient, she is agreeable to medical management, does not wish for any invasive procedures, including LHC  -Continue ASA, Plavix, norvasc, Lisinopril, statin  -D/C nitropaste, start Imdur 30 mg daily

## 2017-12-26 NOTE — PLAN OF CARE
Problem: Patient Care Overview  Goal: Plan of Care Review  Outcome: Ongoing (interventions implemented as appropriate)  Pt continues to be on vapotherm. No significant changes to note at this time.

## 2017-12-27 LAB
ANION GAP SERPL CALC-SCNC: 10 MMOL/L
BACTERIA SPEC AEROBE CULT: NORMAL
BASOPHILS # BLD AUTO: ABNORMAL K/UL
BASOPHILS NFR BLD: 0 %
BUN SERPL-MCNC: 42 MG/DL
C DIFF GDH STL QL: NEGATIVE
C DIFF TOX A+B STL QL IA: NEGATIVE
CALCIUM SERPL-MCNC: 8.7 MG/DL
CHLORIDE SERPL-SCNC: 104 MMOL/L
CO2 SERPL-SCNC: 27 MMOL/L
CREAT SERPL-MCNC: 1.1 MG/DL
DIFFERENTIAL METHOD: ABNORMAL
EOSINOPHIL # BLD AUTO: ABNORMAL K/UL
EOSINOPHIL NFR BLD: 0 %
ERYTHROCYTE [DISTWIDTH] IN BLOOD BY AUTOMATED COUNT: 14.5 %
EST. GFR  (AFRICAN AMERICAN): 51 ML/MIN/1.73 M^2
EST. GFR  (NON AFRICAN AMERICAN): 44 ML/MIN/1.73 M^2
GLUCOSE SERPL-MCNC: 38 MG/DL
GRAM STN SPEC: NORMAL
HCT VFR BLD AUTO: 38.1 %
HGB BLD-MCNC: 12.8 G/DL
LYMPHOCYTES # BLD AUTO: ABNORMAL K/UL
LYMPHOCYTES NFR BLD: 7 %
MAGNESIUM SERPL-MCNC: 2.2 MG/DL
MCH RBC QN AUTO: 30.3 PG
MCHC RBC AUTO-ENTMCNC: 33.6 G/DL
MCV RBC AUTO: 90 FL
MONOCYTES # BLD AUTO: ABNORMAL K/UL
MONOCYTES NFR BLD: 4 %
NEUTROPHILS NFR BLD: 89 %
PLATELET # BLD AUTO: 372 K/UL
PMV BLD AUTO: 9.8 FL
POCT GLUCOSE: 101 MG/DL (ref 70–110)
POCT GLUCOSE: 127 MG/DL (ref 70–110)
POCT GLUCOSE: 166 MG/DL (ref 70–110)
POCT GLUCOSE: 43 MG/DL (ref 70–110)
POCT GLUCOSE: 44 MG/DL (ref 70–110)
POCT GLUCOSE: 49 MG/DL (ref 70–110)
POCT GLUCOSE: 75 MG/DL (ref 70–110)
POCT GLUCOSE: 97 MG/DL (ref 70–110)
POTASSIUM SERPL-SCNC: 3 MMOL/L
RBC # BLD AUTO: 4.22 M/UL
SODIUM SERPL-SCNC: 141 MMOL/L
WBC # BLD AUTO: 21.38 K/UL

## 2017-12-27 PROCEDURE — 99900035 HC TECH TIME PER 15 MIN (STAT)

## 2017-12-27 PROCEDURE — 25000003 PHARM REV CODE 250: Performed by: PHYSICIAN ASSISTANT

## 2017-12-27 PROCEDURE — 25000003 PHARM REV CODE 250: Performed by: INTERNAL MEDICINE

## 2017-12-27 PROCEDURE — 25000003 PHARM REV CODE 250

## 2017-12-27 PROCEDURE — 27100092 HC HIGH FLOW DELIVERY CANNULA

## 2017-12-27 PROCEDURE — 21400001 HC TELEMETRY ROOM

## 2017-12-27 PROCEDURE — 63600175 PHARM REV CODE 636 W HCPCS: Performed by: INTERNAL MEDICINE

## 2017-12-27 PROCEDURE — 87449 NOS EACH ORGANISM AG IA: CPT

## 2017-12-27 PROCEDURE — 36415 COLL VENOUS BLD VENIPUNCTURE: CPT

## 2017-12-27 PROCEDURE — 85007 BL SMEAR W/DIFF WBC COUNT: CPT

## 2017-12-27 PROCEDURE — 94761 N-INVAS EAR/PLS OXIMETRY MLT: CPT

## 2017-12-27 PROCEDURE — 25000003 PHARM REV CODE 250: Performed by: NURSE PRACTITIONER

## 2017-12-27 PROCEDURE — 99232 SBSQ HOSP IP/OBS MODERATE 35: CPT | Mod: ,,, | Performed by: INTERNAL MEDICINE

## 2017-12-27 PROCEDURE — 63600175 PHARM REV CODE 636 W HCPCS: Performed by: NURSE PRACTITIONER

## 2017-12-27 PROCEDURE — 27000221 HC OXYGEN, UP TO 24 HOURS

## 2017-12-27 PROCEDURE — 99233 SBSQ HOSP IP/OBS HIGH 50: CPT | Mod: ,,, | Performed by: INTERNAL MEDICINE

## 2017-12-27 PROCEDURE — 83735 ASSAY OF MAGNESIUM: CPT

## 2017-12-27 PROCEDURE — 27000173 HC ACAPELLA DEVICE DH OR DM

## 2017-12-27 PROCEDURE — 96372 THER/PROPH/DIAG INJ SC/IM: CPT

## 2017-12-27 PROCEDURE — 80048 BASIC METABOLIC PNL TOTAL CA: CPT

## 2017-12-27 PROCEDURE — 27100171 HC OXYGEN HIGH FLOW UP TO 24 HOURS

## 2017-12-27 PROCEDURE — 94664 DEMO&/EVAL PT USE INHALER: CPT

## 2017-12-27 PROCEDURE — 85027 COMPLETE CBC AUTOMATED: CPT

## 2017-12-27 RX ADMIN — BALSALAZIDE DISODIUM 750 MG: 750 CAPSULE ORAL at 05:12

## 2017-12-27 RX ADMIN — SIMVASTATIN 20 MG: 20 TABLET, FILM COATED ORAL at 09:12

## 2017-12-27 RX ADMIN — DEXTROSE MONOHYDRATE 12.5 G: 25 INJECTION, SOLUTION INTRAVENOUS at 06:12

## 2017-12-27 RX ADMIN — SERTRALINE HYDROCHLORIDE 50 MG: 50 TABLET ORAL at 08:12

## 2017-12-27 RX ADMIN — ZINC OXIDE: 200 PASTE TOPICAL at 08:12

## 2017-12-27 RX ADMIN — CLOPIDOGREL BISULFATE 75 MG: 75 TABLET ORAL at 08:12

## 2017-12-27 RX ADMIN — LISINOPRIL 40 MG: 20 TABLET ORAL at 09:12

## 2017-12-27 RX ADMIN — PIPERACILLIN AND TAZOBACTAM 4.5 G: 4; .5 INJECTION, POWDER, FOR SOLUTION INTRAVENOUS at 04:12

## 2017-12-27 RX ADMIN — PIPERACILLIN AND TAZOBACTAM 4.5 G: 4; .5 INJECTION, POWDER, FOR SOLUTION INTRAVENOUS at 08:12

## 2017-12-27 RX ADMIN — NITROGLYCERIN 1 INCH: 20 OINTMENT TOPICAL at 03:12

## 2017-12-27 RX ADMIN — LEVOTHYROXINE SODIUM 100 MCG: 100 TABLET ORAL at 05:12

## 2017-12-27 RX ADMIN — ISOSORBIDE MONONITRATE 30 MG: 30 TABLET, EXTENDED RELEASE ORAL at 08:12

## 2017-12-27 RX ADMIN — DEXTROSE MONOHYDRATE 25 G: 25 INJECTION, SOLUTION INTRAVENOUS at 06:12

## 2017-12-27 RX ADMIN — LINEZOLID 600 MG: 2 INJECTION, SOLUTION INTRAVENOUS at 09:12

## 2017-12-27 RX ADMIN — HEPARIN SODIUM 5000 UNITS: 5000 INJECTION, SOLUTION INTRAVENOUS; SUBCUTANEOUS at 02:12

## 2017-12-27 RX ADMIN — PIPERACILLIN AND TAZOBACTAM 4.5 G: 4; .5 INJECTION, POWDER, FOR SOLUTION INTRAVENOUS at 11:12

## 2017-12-27 RX ADMIN — ASPIRIN 81 MG CHEWABLE TABLET 81 MG: 81 TABLET CHEWABLE at 08:12

## 2017-12-27 RX ADMIN — NITROGLYCERIN 1 INCH: 20 OINTMENT TOPICAL at 11:12

## 2017-12-27 RX ADMIN — MEMANTINE HYDROCHLORIDE 5 MG: 5 TABLET ORAL at 09:12

## 2017-12-27 RX ADMIN — ZINC OXIDE: 200 PASTE TOPICAL at 09:12

## 2017-12-27 RX ADMIN — BALSALAZIDE DISODIUM 750 MG: 750 CAPSULE ORAL at 09:12

## 2017-12-27 RX ADMIN — PANTOPRAZOLE SODIUM 40 MG: 40 TABLET, DELAYED RELEASE ORAL at 08:12

## 2017-12-27 RX ADMIN — HEPARIN SODIUM 5000 UNITS: 5000 INJECTION, SOLUTION INTRAVENOUS; SUBCUTANEOUS at 09:12

## 2017-12-27 RX ADMIN — HEPARIN SODIUM 5000 UNITS: 5000 INJECTION, SOLUTION INTRAVENOUS; SUBCUTANEOUS at 05:12

## 2017-12-27 RX ADMIN — AMLODIPINE BESYLATE 10 MG: 10 TABLET ORAL at 08:12

## 2017-12-27 NOTE — PROGRESS NOTES
It is likely due to the amlodipine.     I'd like for him to try cutting the tablet in half and taking 1/2 tab daily instead.     He should keep the appt for 4/17.  He should report any chest pain or dyspnea ASAP   Patient expressed discomfort with the vibramatic CPT. Stopped CPT after 2 minutes. Will discuss with Dr. Garcia

## 2017-12-27 NOTE — SUBJECTIVE & OBJECTIVE
Interval History: Breathing improved. No chest pain. Having issues with diarrhea. K low.     Review of Systems   Constitution: Positive for malaise/fatigue.   Eyes: Negative.    Cardiovascular: Positive for dyspnea on exertion.   Respiratory: Positive for cough and shortness of breath (improved).    Endocrine: Negative.    Hematologic/Lymphatic: Negative.    Skin: Negative.    Musculoskeletal: Negative.    Gastrointestinal: Positive for diarrhea.   Neurological: Negative.    Psychiatric/Behavioral: Negative.    Allergic/Immunologic: Negative.      Objective:     Vital Signs (Most Recent):  Temp: 98 °F (36.7 °C) (12/27/17 1136)  Pulse: (!) 50 (12/27/17 1300)  Resp: 18 (12/27/17 1136)  BP: (!) 162/71 (12/27/17 1136)  SpO2: (!) 90 % (12/27/17 1136) Vital Signs (24h Range):  Temp:  [98 °F (36.7 °C)-98.6 °F (37 °C)] 98 °F (36.7 °C)  Pulse:  [50-66] 50  Resp:  [15-21] 18  SpO2:  [90 %-98 %] 90 %  BP: (121-193)/() 162/71     Weight: 71.7 kg (158 lb 1.1 oz)  Body mass index is 27.13 kg/m².     SpO2: (!) 90 %  O2 Device (Oxygen Therapy): nasal cannula      Intake/Output Summary (Last 24 hours) at 12/27/17 1334  Last data filed at 12/27/17 0408   Gross per 24 hour   Intake              650 ml   Output              300 ml   Net              350 ml       Lines/Drains/Airways     Peripheral Intravenous Line                 Peripheral IV - Single Lumen 12/22/17 Right Forearm 5 days                Physical Exam   Constitutional: She is oriented to person, place, and time. She appears well-developed and well-nourished. No distress.   On vapotherm   HENT:   Head: Normocephalic and atraumatic.   Eyes: Pupils are equal, round, and reactive to light. Right eye exhibits no discharge. Left eye exhibits no discharge.   Neck: Neck supple. No JVD present. No tracheal deviation present. No thyromegaly present.   Cardiovascular: Regular rhythm, S2 normal and normal heart sounds.  Bradycardia present.  PMI is not displaced.  Exam reveals  no gallop, no S3, no S4 and no friction rub.    No murmur heard.  Pulses:       Radial pulses are 2+ on the right side, and 2+ on the left side.   Pulmonary/Chest: Effort normal and breath sounds normal. No respiratory distress. She has no wheezes. She has no rales.   Abdominal: Soft. She exhibits no distension. There is no tenderness. There is no rebound.   Musculoskeletal: She exhibits no edema.   Neurological: She is alert and oriented to person, place, and time.   Skin: Skin is warm and dry. She is not diaphoretic. No erythema.   +scattered ecchymoses     Psychiatric: She has a normal mood and affect. Her behavior is normal.   Nursing note and vitals reviewed.      Significant Labs:   CMP   Recent Labs  Lab 12/26/17 0359 12/27/17 0357    141   K 3.1* 3.0*    104   CO2 24 27   * 38*   BUN 44* 42*   CREATININE 1.3 1.1   CALCIUM 8.4* 8.7   ANIONGAP 14 10   ESTGFRAFRICA 41* 51*   EGFRNONAA 36* 44*    and CBC   Recent Labs  Lab 12/26/17 0359 12/27/17 0357   WBC 14.18* 21.38*   HGB 12.2 12.8   HCT 36.2* 38.1    372*

## 2017-12-27 NOTE — PROGRESS NOTES
Pt still c/o some Lt chest tightness. Desatted to 83% on 25 LPM and 50 % o2 concentration. Turned up to 35 LPM and 75% o2 concentration, now satting 92%. Dr Garcia notified. Instructed me to contact cards. PENNY Gardner c cards contacted. PENNY Gardner to have associate or hospitalist see pt.

## 2017-12-27 NOTE — ASSESSMENT & PLAN NOTE
INSULIN ASPART+ INSULIN DETEMIR. Blood glucose target 100 - 180 mg/dl. Levothyroxine 100 mcg PO

## 2017-12-27 NOTE — SUBJECTIVE & OBJECTIVE
Interval History: Seen and examined at bedside. Hospital chart reviewed. No acute interval detrimental events noted. She reports that she  is unchanged.      Review of Systems   HENT: Negative.    Eyes: Negative.    Respiratory: Positive for cough and shortness of breath.    Cardiovascular: Negative.    Gastrointestinal: Negative.    Genitourinary: Negative.    Musculoskeletal: Negative.    Skin: Negative.    Neurological: Positive for weakness.   Endo/Heme/Allergies: Negative.    Psychiatric/Behavioral: Negative.        Scheduled Meds:   amLODIPine  10 mg Oral Daily    aspirin  81 mg Oral Daily    balsalazide  750 mg Oral TID    clopidogrel  75 mg Oral Daily    heparin (porcine)  5,000 Units Subcutaneous Q8H    insulin detemir  15 Units Subcutaneous QHS    isosorbide mononitrate  30 mg Oral Daily    levothyroxine  100 mcg Oral Before breakfast    linezolid 600mg/300ml  600 mg Intravenous Q12H    lisinopril  40 mg Oral QHS    memantine  5 mg Oral QHS    pantoprazole  40 mg Oral Daily    piperacillin-tazobactam 4.5 g in sodium chloride 0.9% 100 mL IVPB (ready to mix system)  4.5 g Intravenous Q8H    sertraline  50 mg Oral Daily    simvastatin  20 mg Oral QHS    zinc oxide-petrolatum   Topical (Top) BID     Continuous Infusions:    PRN Meds:.acetaminophen, albuterol-ipratropium 2.5mg-0.5mg/3mL, dextrose 50%, dextrose 50%, glucagon (human recombinant), glucose, glucose, hydrALAZINE, insulin aspart     Objective:     Vital Signs (Most Recent):  Temp: 98.2 °F (36.8 °C) (12/27/17 0837)  Pulse: (!) 58 (12/27/17 0900)  Resp: 18 (12/27/17 0837)  BP: (!) 165/71 (12/27/17 0837)  SpO2: (!) 94 % (12/27/17 0837) Vital Signs (24h Range):  Temp:  [98 °F (36.7 °C)-98.6 °F (37 °C)] 98.2 °F (36.8 °C)  Pulse:  [53-66] 58  Resp:  [14-23] 18  SpO2:  [91 %-98 %] 94 %  BP: (121-193)/() 165/71     Weight: 71.7 kg (158 lb 1.1 oz)  Body mass index is 27.13 kg/m².      Intake/Output Summary (Last 24 hours) at 12/27/17  1105  Last data filed at 12/27/17 0408   Gross per 24 hour   Intake             1050 ml   Output              300 ml   Net              750 ml       Physical Exam   Constitutional: She is oriented to person, place, and time. She appears well-nourished.   HENT:   Head: Normocephalic and atraumatic.   Mouth/Throat: Oropharynx is clear and moist. No oropharyngeal exudate.   Eyes: EOM are normal. Pupils are equal, round, and reactive to light.   Neck: Normal range of motion. Neck supple.   Cardiovascular: Normal rate, regular rhythm and normal heart sounds.    Pulmonary/Chest: Effort normal. She has rales.   Abdominal: Soft. Bowel sounds are normal.   Genitourinary:   Genitourinary Comments: Has hawkins   Musculoskeletal: Normal range of motion.   Neurological: She is alert and oriented to person, place, and time.   Skin: Skin is warm and dry. Capillary refill takes 2 to 3 seconds.   Psychiatric: She has a normal mood and affect.   Nursing note and vitals reviewed.      Vents:  Oxygen Concentration (%): 55 (12/27/17 0792)    Lines/Drains/Airways     Peripheral Intravenous Line                 Peripheral IV - Single Lumen 12/22/17 Right Forearm 5 days                Significant Labs:    CBC/Anemia Profile:    Recent Labs  Lab 12/26/17  0359 12/27/17  0357   WBC 14.18* 21.38*   HGB 12.2 12.8   HCT 36.2* 38.1    372*   MCV 90 90   RDW 14.4 14.5        Chemistries:    Recent Labs  Lab 12/26/17  0359 12/27/17  0357    141   K 3.1* 3.0*    104   CO2 24 27   BUN 44* 42*   CREATININE 1.3 1.1   CALCIUM 8.4* 8.7   MG 2.0 2.2       Significant Imaging:    CTA Chest Non Coronary :     There are bilateral pleural effusions of a small to slightly moderate degree. There are small mediastinal nodes throughout of indeterminate significance and etiology. There is opacification of the thoracic aorta without evidence of aneurysm formation or dissection. There is opacification of the pulmonary arterial system. There no  gross filling defects within the major branches.    There are chronic lung changes bilaterally. Extensive groundglass infiltrates are noted bilaterally scattered throughout both lungs with partial consolidation posteriorly at the lung bases. There is also partial consolidation along the perihilar cysts or perihilar zones, especially on the right anteromedially involving the upper lobe.    The upper abdomen shows no acute abnormality.

## 2017-12-27 NOTE — PROGRESS NOTES
Pt c/o Lt chest tightness, 6/10, worse c expiration. No associated sx. See flowsheet for VS. Dr. Garcia notified. 1 in nitropaste ordered and applied per orders. Will continue to monitor.

## 2017-12-27 NOTE — ASSESSMENT & PLAN NOTE
Supplement oxygenation. High Flow Oxygen.  Keep SAO2 >= 92%. BIPAP 10/5 QHS and PRN. Bronchodilators per orders.   PREDNISONE.

## 2017-12-27 NOTE — PLAN OF CARE
Pt remains free of injuries.   C/o lt chest pain, tightness, worse c expiration.  Slightly improved c nitropaste.  IV antibiotics per MD orders.    12 hr chart check completed. Will continue to monitor.

## 2017-12-27 NOTE — ASSESSMENT & PLAN NOTE
-Secondary to demand ischemia from pneumonia/respiratory failure  -Discussed with family and patient, she is agreeable to medical management, does not wish for any invasive procedures, including LHC  -Continue ASA, Plavix, norvasc, Lisinopril, statin  -D/C nitropaste, start Imdur 30 mg daily  -No BB given due to bradycardia

## 2017-12-27 NOTE — PLAN OF CARE
Problem: Patient Care Overview  Goal: Plan of Care Review  Outcome: Ongoing (interventions implemented as appropriate)  Patient AAOx4.  Vitals stable.  No complaints of pain.  Tolerating Vapotherrm well.  IV abx administered as ordered.  Bed alarm activated. No falls on shift.  [place] encouraged.  POC reviewed with patient

## 2017-12-27 NOTE — PROGRESS NOTES
[facility]  Pulmonology  Progress Note    Patient Name: Olivia Reyes  MRN: [id number]  Admission Date: 12/22/2017  Hospital Length of Stay: 5 days  Code Status: DNR  Attending Provider: Polly Garcia MD  Primary Care Provider: No primary care provider on file.   Principal Problem: Acute respiratory failure with hypoxia and hypercarbia    Subjective:     Interval History: Seen and examined at bedside. Hospital chart reviewed. No acute interval detrimental events noted. She reports that she  is unchanged.      Review of Systems   HENT: Negative.    Eyes: Negative.    Respiratory: Positive for cough and shortness of breath.    Cardiovascular: Negative.    Gastrointestinal: Negative.    Genitourinary: Negative.    Musculoskeletal: Negative.    Skin: Negative.    Neurological: Positive for weakness.   Endo/Heme/Allergies: Negative.    Psychiatric/Behavioral: Negative.        Scheduled Meds:   amLODIPine  10 mg Oral Daily    aspirin  81 mg Oral Daily    balsalazide  750 mg Oral TID    clopidogrel  75 mg Oral Daily    heparin (porcine)  5,000 Units Subcutaneous Q8H    insulin detemir  15 Units Subcutaneous QHS    isosorbide mononitrate  30 mg Oral Daily    levothyroxine  100 mcg Oral Before breakfast    linezolid 600mg/300ml  600 mg Intravenous Q12H    lisinopril  40 mg Oral QHS    memantine  5 mg Oral QHS    pantoprazole  40 mg Oral Daily    piperacillin-tazobactam 4.5 g in sodium chloride 0.9% 100 mL IVPB (ready to mix system)  4.5 g Intravenous Q8H    sertraline  50 mg Oral Daily    simvastatin  20 mg Oral QHS    zinc oxide-petrolatum   Topical (Top) BID     Continuous Infusions:    PRN Meds:.acetaminophen, albuterol-ipratropium 2.5mg-0.5mg/3mL, dextrose 50%, dextrose 50%, glucagon (human recombinant), glucose, glucose, hydrALAZINE, insulin aspart     Objective:     Vital Signs (Most Recent):  Temp: 98.2 °F (36.8 °C) (12/27/17 0837)  Pulse: (!) 58 (12/27/17 0900)  Resp: 18  (12/27/17 0837)  BP: (!) 165/71 (12/27/17 0837)  SpO2: (!) 94 % (12/27/17 0837) Vital Signs (24h Range):  Temp:  [98 °F (36.7 °C)-98.6 °F (37 °C)] 98.2 °F (36.8 °C)  Pulse:  [53-66] 58  Resp:  [14-23] 18  SpO2:  [91 %-98 %] 94 %  BP: (121-193)/() 165/71     Weight: 71.7 kg (158 lb 1.1 oz)  Body mass index is 27.13 kg/m².      Intake/Output Summary (Last 24 hours) at 12/27/17 1105  Last data filed at 12/27/17 0408   Gross per 24 hour   Intake             1050 ml   Output              300 ml   Net              750 ml       Physical Exam   Constitutional: She is oriented to person, place, and time. She appears well-nourished.   HENT:   Head: Normocephalic and atraumatic.   Mouth/Throat: Oropharynx is clear and moist. No oropharyngeal exudate.   Eyes: EOM are normal. Pupils are equal, round, and reactive to light.   Neck: Normal range of motion. Neck supple.   Cardiovascular: Normal rate, regular rhythm and normal heart sounds.    Pulmonary/Chest: Effort normal. She has rales.   Abdominal: Soft. Bowel sounds are normal.   Genitourinary:   Genitourinary Comments: Has hawkins   Musculoskeletal: Normal range of motion.   Neurological: She is alert and oriented to person, place, and time.   Skin: Skin is warm and dry. Capillary refill takes 2 to 3 seconds.   Psychiatric: She has a normal mood and affect.   Nursing note and vitals reviewed.      Vents:  Oxygen Concentration (%): 55 (12/27/17 0727)    Lines/Drains/Airways     Peripheral Intravenous Line                 Peripheral IV - Single Lumen 12/22/17 Right Forearm 5 days                Significant Labs:    CBC/Anemia Profile:    Recent Labs  Lab 12/26/17 0359 12/27/17 0357   WBC 14.18* 21.38*   HGB 12.2 12.8   HCT 36.2* 38.1    372*   MCV 90 90   RDW 14.4 14.5        Chemistries:    Recent Labs  Lab 12/26/17 0359 12/27/17  0357    141   K 3.1* 3.0*    104   CO2 24 27   BUN 44* 42*   CREATININE 1.3 1.1   CALCIUM 8.4* 8.7   MG 2.0 2.2        Significant Imaging:    CTA Chest Non Coronary :     There are bilateral pleural effusions of a small to slightly moderate degree. There are small mediastinal nodes throughout of indeterminate significance and etiology. There is opacification of the thoracic aorta without evidence of aneurysm formation or dissection. There is opacification of the pulmonary arterial system. There no gross filling defects within the major branches.    There are chronic lung changes bilaterally. Extensive groundglass infiltrates are noted bilaterally scattered throughout both lungs with partial consolidation posteriorly at the lung bases. There is also partial consolidation along the perihilar cysts or perihilar zones, especially on the right anteromedially involving the upper lobe.    The upper abdomen shows no acute abnormality.    Assessment/Plan:     * Acute respiratory failure with hypoxia and hypercarbia    Supplement oxygenation. High Flow Oxygen.  Keep SAO2 >= 92%. BIPAP 10/5 QHS and PRN. Bronchodilators per orders.   PREDNISONE.            Diabetes mellitus type 2, uncontrolled    INSULIN ASPART+ INSULIN DETEMIR. Blood glucose target 100 - 180 mg/dl. Levothyroxine 100 mcg PO            NSTEMI (non-ST elevated myocardial infarction)    ASA, PLAVIX, SIMVASTATIN, ISMO,  LISINOPRIL. Cardiology following.           Pneumonia of left lower lobe due to infectious organism    IV ZOSYN + ZYVOX.                Dontae Randle MD  Pulmonology  [facility]

## 2017-12-27 NOTE — PLAN OF CARE
Problem: Patient Care Overview  Goal: Plan of Care Review  Outcome: Ongoing (interventions implemented as appropriate)  Pt did well on high flow nasal cannula today, continuing to wean Fi02.  VSS.  Pt continued to have diarrhea with coughing today.  Pt does not want a fecal management system.  Balsalazide started today by Dr. Randle.  Pt states she has been taking it for about a year now and it has helped with her UC.  Pt started on a diet this afternoon.  Pt ate 50% of her dinner.  Pt states she is feeling better this afternoon.  Pt has telemetry orders.

## 2017-12-27 NOTE — NURSING
Pt transported to telemetry via bed, monitor in place, transported with hearing aids and eyeglasses.

## 2017-12-27 NOTE — PROGRESS NOTES
[facility]  Cardiology  Progress Note    Patient Name: Olivia Reyes  MRN: [id number]  Admission Date: 12/22/2017  Hospital Length of Stay: 5 days  Code Status: DNR   Attending Physician: Polly Garcia MD   Primary Care Physician: No primary care provider on file.  Expected Discharge Date:   Principal Problem:Acute respiratory failure with hypoxia and hypercarbia    Subjective:   HPI:  Ms. Reyes is a 89 year old female patient who presented to [facility] ED on 12/23/17 with acute respiratory failure, pneumonia, CHF, and NSTEMI.     Hospital Course:   12/26/17-Patient seen and examined today, resting in bed. States SOB is improving. No chest pain. Troponin trending down. Creatine stable, K slightly low at 3.1. EKG changes have resolved.     12/27/17-Patient seen and examined today, resting in bed. Reports diarrhea. SOB improving, remains on Vapotherm. Labs reviewed. K low at 3.0. Creatine stable.     Interval History: Breathing improved. No chest pain. Having issues with diarrhea. K low.     Review of Systems   Constitution: Positive for malaise/fatigue.   Eyes: Negative.    Cardiovascular: Positive for dyspnea on exertion.   Respiratory: Positive for cough and shortness of breath (improved).    Endocrine: Negative.    Hematologic/Lymphatic: Negative.    Skin: Negative.    Musculoskeletal: Negative.    Gastrointestinal: Positive for diarrhea.   Neurological: Negative.    Psychiatric/Behavioral: Negative.    Allergic/Immunologic: Negative.      Objective:     Vital Signs (Most Recent):  Temp: 98 °F (36.7 °C) (12/27/17 1136)  Pulse: (!) 50 (12/27/17 1300)  Resp: 18 (12/27/17 1136)  BP: (!) 162/71 (12/27/17 1136)  SpO2: (!) 90 % (12/27/17 1136) Vital Signs (24h Range):  Temp:  [98 °F (36.7 °C)-98.6 °F (37 °C)] 98 °F (36.7 °C)  Pulse:  [50-66] 50  Resp:  [15-21] 18  SpO2:  [90 %-98 %] 90 %  BP: (121-193)/() 162/71     Weight: 71.7 kg (158 lb 1.1 oz)  Body mass index is 27.13 kg/m².     SpO2: (!)  90 %  O2 Device (Oxygen Therapy): nasal cannula      Intake/Output Summary (Last 24 hours) at 12/27/17 1334  Last data filed at 12/27/17 0408   Gross per 24 hour   Intake              650 ml   Output              300 ml   Net              350 ml       Lines/Drains/Airways     Peripheral Intravenous Line                 Peripheral IV - Single Lumen 12/22/17 Right Forearm 5 days                Physical Exam   Constitutional: She is oriented to person, place, and time. She appears well-developed and well-nourished. No distress.   On vapotherm   HENT:   Head: Normocephalic and atraumatic.   Eyes: Pupils are equal, round, and reactive to light. Right eye exhibits no discharge. Left eye exhibits no discharge.   Neck: Neck supple. No JVD present. No tracheal deviation present. No thyromegaly present.   Cardiovascular: Regular rhythm, S2 normal and normal heart sounds.  Bradycardia present.  PMI is not displaced.  Exam reveals no gallop, no S3, no S4 and no friction rub.    No murmur heard.  Pulses:       Radial pulses are 2+ on the right side, and 2+ on the left side.   Pulmonary/Chest: Coarse BS/rhonchi throughout  Abdominal: Soft. She exhibits no distension. There is no tenderness. There is no rebound.   Musculoskeletal: She exhibits no edema.   Neurological: She is alert and oriented to person, place, and time.   Skin: Skin is warm and dry. She is not diaphoretic. No erythema.   +scattered ecchymoses     Psychiatric: She has a normal mood and affect. Her behavior is normal.   Nursing note and vitals reviewed.      Significant Labs:   CMP   Recent Labs  Lab 12/26/17 0359 12/27/17 0357    141   K 3.1* 3.0*    104   CO2 24 27   * 38*   BUN 44* 42*   CREATININE 1.3 1.1   CALCIUM 8.4* 8.7   ANIONGAP 14 10   ESTGFRAFRICA 41* 51*   EGFRNONAA 36* 44*    and CBC   Recent Labs  Lab 12/26/17 0359 12/27/17 0357   WBC 14.18* 21.38*   HGB 12.2 12.8   HCT 36.2* 38.1    372*           Assessment and Plan:      Brief HPI: Patient who presents with NSTEMI in setting of respiratory failure/pneumonia. Stable CV wise. No chest pain. Continue current medical management. No BB given bradycardia. Replete K. C diff workup in progress.    Patient and family agreeable to medical management, do not wish for any invasive procedures.     * Acute respiratory failure with hypoxia and hypercarbia    Continue diuretics,dc  IV heparin, continueasa,plavix, nitrates        Acute CHF    -Secondary to respiratory illness and uncontrolled BP  -Continue BP medications and IV lasix as needed        NSTEMI (non-ST elevated myocardial infarction)    -Secondary to demand ischemia from pneumonia/respiratory failure  -Discussed with family and patient, she is agreeable to medical management, does not wish for any invasive procedures, including LHC  -Continue ASA, Plavix, norvasc, Lisinopril, statin  -D/C nitropaste, start Imdur 30 mg daily  -No BB given due to bradycardia              VTE Risk Mitigation         Ordered     heparin (porcine) injection 5,000 Units  Every 8 hours     Route:  Subcutaneous        12/26/17 1000     Medium Risk of VTE  Once      12/22/17 2321     Place sequential compression device  Until discontinued      12/22/17 2321          Berenice Samuels PA-C  Cardiology  [facility]    Chart reviewed. Dr. Rodriguez examined patient and agrees with plan as outlined above.

## 2017-12-28 PROBLEM — L25.8 INCONTINENCE ASSOCIATED DERMATITIS: Status: ACTIVE | Noted: 2017-12-28

## 2017-12-28 PROBLEM — R32 INCONTINENCE ASSOCIATED DERMATITIS: Status: ACTIVE | Noted: 2017-12-28

## 2017-12-28 LAB
ANION GAP SERPL CALC-SCNC: 11 MMOL/L
BACTERIA BLD CULT: NORMAL
BACTERIA BLD CULT: NORMAL
BASOPHILS # BLD AUTO: 0 K/UL
BASOPHILS NFR BLD: 0 %
BNP SERPL-MCNC: 167 PG/ML
BUN SERPL-MCNC: 28 MG/DL
CALCIUM SERPL-MCNC: 8.2 MG/DL
CHLORIDE SERPL-SCNC: 105 MMOL/L
CO2 SERPL-SCNC: 27 MMOL/L
CREAT SERPL-MCNC: 0.8 MG/DL
DIFFERENTIAL METHOD: ABNORMAL
EOSINOPHIL # BLD AUTO: 0 K/UL
EOSINOPHIL NFR BLD: 0.3 %
ERYTHROCYTE [DISTWIDTH] IN BLOOD BY AUTOMATED COUNT: 14.6 %
EST. GFR  (AFRICAN AMERICAN): >60 ML/MIN/1.73 M^2
EST. GFR  (NON AFRICAN AMERICAN): >60 ML/MIN/1.73 M^2
GLUCOSE SERPL-MCNC: 127 MG/DL
HCT VFR BLD AUTO: 35.3 %
HGB BLD-MCNC: 11.6 G/DL
LYMPHOCYTES # BLD AUTO: 1.2 K/UL
LYMPHOCYTES NFR BLD: 11.6 %
MAGNESIUM SERPL-MCNC: 2 MG/DL
MCH RBC QN AUTO: 29.8 PG
MCHC RBC AUTO-ENTMCNC: 32.9 G/DL
MCV RBC AUTO: 91 FL
MONOCYTES # BLD AUTO: 0.9 K/UL
MONOCYTES NFR BLD: 8.4 %
NEUTROPHILS # BLD AUTO: 8.1 K/UL
NEUTROPHILS NFR BLD: 79.7 %
PLATELET # BLD AUTO: 287 K/UL
PMV BLD AUTO: 9.4 FL
POCT GLUCOSE: 132 MG/DL (ref 70–110)
POCT GLUCOSE: 138 MG/DL (ref 70–110)
POCT GLUCOSE: 163 MG/DL (ref 70–110)
POCT GLUCOSE: 353 MG/DL (ref 70–110)
POCT GLUCOSE: 357 MG/DL (ref 70–110)
POTASSIUM SERPL-SCNC: 2.8 MMOL/L
RBC # BLD AUTO: 3.89 M/UL
SODIUM SERPL-SCNC: 143 MMOL/L
TROPONIN I SERPL DL<=0.01 NG/ML-MCNC: 0.44 NG/ML
WBC # BLD AUTO: 10.1 K/UL

## 2017-12-28 PROCEDURE — 93005 ELECTROCARDIOGRAM TRACING: CPT

## 2017-12-28 PROCEDURE — 83880 ASSAY OF NATRIURETIC PEPTIDE: CPT

## 2017-12-28 PROCEDURE — 27000221 HC OXYGEN, UP TO 24 HOURS

## 2017-12-28 PROCEDURE — 63600175 PHARM REV CODE 636 W HCPCS: Performed by: INTERNAL MEDICINE

## 2017-12-28 PROCEDURE — 99900035 HC TECH TIME PER 15 MIN (STAT)

## 2017-12-28 PROCEDURE — 83735 ASSAY OF MAGNESIUM: CPT

## 2017-12-28 PROCEDURE — 63600175 PHARM REV CODE 636 W HCPCS: Performed by: NURSE PRACTITIONER

## 2017-12-28 PROCEDURE — 93010 ELECTROCARDIOGRAM REPORT: CPT | Mod: ,,, | Performed by: INTERNAL MEDICINE

## 2017-12-28 PROCEDURE — 94761 N-INVAS EAR/PLS OXIMETRY MLT: CPT

## 2017-12-28 PROCEDURE — 99233 SBSQ HOSP IP/OBS HIGH 50: CPT | Mod: ,,, | Performed by: INTERNAL MEDICINE

## 2017-12-28 PROCEDURE — 25000003 PHARM REV CODE 250: Performed by: INTERNAL MEDICINE

## 2017-12-28 PROCEDURE — 27100171 HC OXYGEN HIGH FLOW UP TO 24 HOURS

## 2017-12-28 PROCEDURE — 84484 ASSAY OF TROPONIN QUANT: CPT

## 2017-12-28 PROCEDURE — 63600175 PHARM REV CODE 636 W HCPCS

## 2017-12-28 PROCEDURE — 92526 ORAL FUNCTION THERAPY: CPT

## 2017-12-28 PROCEDURE — 96372 THER/PROPH/DIAG INJ SC/IM: CPT

## 2017-12-28 PROCEDURE — 80048 BASIC METABOLIC PNL TOTAL CA: CPT

## 2017-12-28 PROCEDURE — 25000003 PHARM REV CODE 250: Performed by: NURSE PRACTITIONER

## 2017-12-28 PROCEDURE — 21400001 HC TELEMETRY ROOM

## 2017-12-28 PROCEDURE — 99232 SBSQ HOSP IP/OBS MODERATE 35: CPT | Mod: ,,, | Performed by: INTERNAL MEDICINE

## 2017-12-28 PROCEDURE — 27000173 HC ACAPELLA DEVICE DH OR DM

## 2017-12-28 PROCEDURE — 94664 DEMO&/EVAL PT USE INHALER: CPT

## 2017-12-28 PROCEDURE — 25000003 PHARM REV CODE 250

## 2017-12-28 PROCEDURE — 85025 COMPLETE CBC W/AUTO DIFF WBC: CPT

## 2017-12-28 PROCEDURE — 25000003 PHARM REV CODE 250: Performed by: PHYSICIAN ASSISTANT

## 2017-12-28 PROCEDURE — 36415 COLL VENOUS BLD VENIPUNCTURE: CPT

## 2017-12-28 RX ORDER — FUROSEMIDE 20 MG/1
20 TABLET ORAL ONCE
Status: COMPLETED | OUTPATIENT
Start: 2017-12-28 | End: 2017-12-28

## 2017-12-28 RX ORDER — MORPHINE SULFATE 4 MG/ML
1 INJECTION, SOLUTION INTRAMUSCULAR; INTRAVENOUS EVERY 4 HOURS PRN
Status: DISPENSED | OUTPATIENT
Start: 2017-12-28 | End: 2017-12-28

## 2017-12-28 RX ORDER — POTASSIUM CHLORIDE 20 MEQ/1
40 TABLET, EXTENDED RELEASE ORAL ONCE
Status: COMPLETED | OUTPATIENT
Start: 2017-12-28 | End: 2017-12-28

## 2017-12-28 RX ORDER — MORPHINE SULFATE 2 MG/ML
1 INJECTION, SOLUTION INTRAMUSCULAR; INTRAVENOUS ONCE
Status: DISCONTINUED | OUTPATIENT
Start: 2017-12-28 | End: 2017-12-28

## 2017-12-28 RX ADMIN — PANTOPRAZOLE SODIUM 40 MG: 40 TABLET, DELAYED RELEASE ORAL at 08:12

## 2017-12-28 RX ADMIN — LINEZOLID 600 MG: 2 INJECTION, SOLUTION INTRAVENOUS at 12:12

## 2017-12-28 RX ADMIN — BALSALAZIDE DISODIUM 750 MG: 750 CAPSULE ORAL at 02:12

## 2017-12-28 RX ADMIN — INSULIN DETEMIR 15 UNITS: 100 INJECTION, SOLUTION SUBCUTANEOUS at 08:12

## 2017-12-28 RX ADMIN — HEPARIN SODIUM 5000 UNITS: 5000 INJECTION, SOLUTION INTRAVENOUS; SUBCUTANEOUS at 11:12

## 2017-12-28 RX ADMIN — ASPIRIN 81 MG CHEWABLE TABLET 81 MG: 81 TABLET CHEWABLE at 08:12

## 2017-12-28 RX ADMIN — ZINC OXIDE: 200 PASTE TOPICAL at 08:12

## 2017-12-28 RX ADMIN — ISOSORBIDE MONONITRATE 30 MG: 30 TABLET, EXTENDED RELEASE ORAL at 08:12

## 2017-12-28 RX ADMIN — SIMVASTATIN 20 MG: 20 TABLET, FILM COATED ORAL at 08:12

## 2017-12-28 RX ADMIN — POTASSIUM CHLORIDE 40 MEQ: 1500 TABLET, EXTENDED RELEASE ORAL at 04:12

## 2017-12-28 RX ADMIN — CLOPIDOGREL BISULFATE 75 MG: 75 TABLET ORAL at 08:12

## 2017-12-28 RX ADMIN — PIPERACILLIN AND TAZOBACTAM 4.5 G: 4; .5 INJECTION, POWDER, FOR SOLUTION INTRAVENOUS at 08:12

## 2017-12-28 RX ADMIN — LISINOPRIL 40 MG: 20 TABLET ORAL at 08:12

## 2017-12-28 RX ADMIN — INSULIN ASPART 10 UNITS: 100 INJECTION, SOLUTION INTRAVENOUS; SUBCUTANEOUS at 04:12

## 2017-12-28 RX ADMIN — MEMANTINE HYDROCHLORIDE 5 MG: 5 TABLET ORAL at 08:12

## 2017-12-28 RX ADMIN — NITROGLYCERIN 1 INCH: 20 OINTMENT TOPICAL at 05:12

## 2017-12-28 RX ADMIN — HYDRALAZINE HYDROCHLORIDE 10 MG: 20 INJECTION INTRAMUSCULAR; INTRAVENOUS at 05:12

## 2017-12-28 RX ADMIN — PIPERACILLIN AND TAZOBACTAM 4.5 G: 4; .5 INJECTION, POWDER, FOR SOLUTION INTRAVENOUS at 12:12

## 2017-12-28 RX ADMIN — BALSALAZIDE DISODIUM 750 MG: 750 CAPSULE ORAL at 05:12

## 2017-12-28 RX ADMIN — HEPARIN SODIUM 5000 UNITS: 5000 INJECTION, SOLUTION INTRAVENOUS; SUBCUTANEOUS at 05:12

## 2017-12-28 RX ADMIN — MORPHINE SULFATE 1 MG: 2 INJECTION, SOLUTION INTRAMUSCULAR; INTRAVENOUS at 06:12

## 2017-12-28 RX ADMIN — PIPERACILLIN AND TAZOBACTAM 4.5 G: 4; .5 INJECTION, POWDER, FOR SOLUTION INTRAVENOUS at 04:12

## 2017-12-28 RX ADMIN — NITROGLYCERIN 1 INCH: 20 OINTMENT TOPICAL at 11:12

## 2017-12-28 RX ADMIN — INSULIN ASPART 1 UNITS: 100 INJECTION, SOLUTION INTRAVENOUS; SUBCUTANEOUS at 11:12

## 2017-12-28 RX ADMIN — LINEZOLID 600 MG: 2 INJECTION, SOLUTION INTRAVENOUS at 11:12

## 2017-12-28 RX ADMIN — HEPARIN SODIUM 5000 UNITS: 5000 INJECTION, SOLUTION INTRAVENOUS; SUBCUTANEOUS at 02:12

## 2017-12-28 RX ADMIN — AMLODIPINE BESYLATE 10 MG: 10 TABLET ORAL at 08:12

## 2017-12-28 RX ADMIN — FUROSEMIDE 20 MG: 20 TABLET ORAL at 08:12

## 2017-12-28 RX ADMIN — SERTRALINE HYDROCHLORIDE 50 MG: 50 TABLET ORAL at 08:12

## 2017-12-28 RX ADMIN — BALSALAZIDE DISODIUM 750 MG: 750 CAPSULE ORAL at 11:12

## 2017-12-28 RX ADMIN — LEVOTHYROXINE SODIUM 100 MCG: 100 TABLET ORAL at 05:12

## 2017-12-28 NOTE — ASSESSMENT & PLAN NOTE
Cardiology consulted, appreciate recs  Trop trended down  asa, plavix, statin, imdur  b blockers on hold for relative bradycardia.   Per cardiology, recommendations are for conservative therapy, patient would not desire aggressive management with [facility] and we will continue medical management    12/28 - Comfort measures employed. No aggressive measures per patient and family. Hospice consult initiated.

## 2017-12-28 NOTE — ASSESSMENT & PLAN NOTE
-Secondary to demand ischemia from pneumonia/respiratory failure  -Discussed with family and patient, she is agreeable to medical management, does not wish for any invasive procedures, including LHC  -Continue ASA, Plavix, norvasc, Lisinopril, statin  -No BB given due to bradycardia  -Having pleuritic chest discomfort, continue pain medication

## 2017-12-28 NOTE — PROGRESS NOTES
"[facility] Hospital Medicine  Progress Note    Patient Name: Olivia Reyes  MRN: [id number]  Patient Class: IP- Inpatient   Admission Date: 12/22/2017  Length of Stay: 6 days  Attending Physician: Ap Feliz MD  Primary Care Provider: Primary Doctor No        Subjective:     Principal Problem:Acute respiratory failure with hypoxia and hypercarbia    HPI:  Olviia Reyes is a 89 y.o. female patient who presented to the Emergency Department for SOB. Onset gradually 1 week ago. Symptoms constant and worsening. Patient reports over past week that she has been noticing increaed SOB with excursion. Prior treatment neb, evaluation at [facility] after hours, and 1 doxycycline. The  states that the pt "deteriorated 3 hours later", which prompted the ED visit.  No mitigating or exacerbating factors reported. Associated sxs include chest pain and "low grade" fever (99.5). En route, pt was placed on CPAP by EMS.  denies any diaphoresis, dizziness, focal weakness/ numbness, cough, palpitations, leg swelling, n/v/d, and all other sxs at this time. No further complaints or concerns at this time. The patient was evaluated in the Er, CBC neg, CMP notable for hypergycemia and CO2 20. Troponin and BNP elevated. UA neg. ABGs pH7.315, CO2 52.3, PO2 70. Chest Xray: Patchy infiltrates scattered about the left lung.  Question of a mild infiltrate right lung base.  Heart size within normal limits.  Vascular calcification of aorta.  No significant bony findings. The patient was placed on Bipap in ER with improvement in symptoms per patient. The patient admitted to ICU with Bipap for Acute Resp Failure with hypoxemia and hypercap.     Hospital Course:  89 y/u female with PMHx with PMhx for HTN, HLP, DM presents with CHF and possible pneumonia. Troponin trended up and cardiology consulted.   Per Dr. Nolasco, patient with atypical CP and (+) troponin. EKG is unremarkable and echo is normal. Cardiology recommended " continue IV diuresis, IV heparin x 48 hours, asa, plavix, statin, b blockers. Recommendations are for conservative therapy, lexiscan stress once diuresed.  12/24 - CTA today revealed small to moderate bilateral pleural effusions. Extensive bilateral infiltrates/pneumonia. Chronic lung changes. Negative for acute thoracic aortic aneurysm or dissection. Negative for acute pulmonary emboli. On BiPap. Moxi Iv. Acetylcysteine nebs. Hypomagnesemia and hypokalemia. Replacing. BP uncontrolled, increasing amlodipine from 5 mg to 10mg daily. Patient continues on lasix 40mg IV BID with I/O -2472 over last 24 hours. Troponin peaked and now downtrending. Remains on heparin gtt with nitro ointment  12/25 - Patient adequately diuresed and is now having mild RENA. D/c lasix. Hyperglycemia uncontrolled. Increased insulin. Remains in ICU in critical condition. Continuing to treat HF and PNA aggressively.   12/26 - Elevated troponin is from NSTEMI secondary to demand ischemia from pneumonia/respiratory failure. Patient is agreeable to medical management, does not wish for any invasive procedures, including LHC. She is to continue on ASA, Plavix, norvasc, Lisinopril, statin. Will D/C nitropaste, start Imdur 30 mg daily. Will consider BB, will hold off for time being given bradycardia noted this AM.   12/27 - Patient improving slowly.     Interval History: Patient remains in respiratory distress.    Review of Systems   Unable to perform ROS: Severe respiratory distress     Objective:     Vital Signs (Most Recent):  Temp: 97.7 °F (36.5 °C) (12/28/17 1101)  Pulse: 66 (12/28/17 1110)  Resp: 18 (12/28/17 1101)  BP: (!) 133/58 (12/28/17 1101)  SpO2: 95 % (12/28/17 1438) Vital Signs (24h Range):  Temp:  [97.1 °F (36.2 °C)-97.9 °F (36.6 °C)] 97.7 °F (36.5 °C)  Pulse:  [52-68] 66  Resp:  [16-22] 18  SpO2:  [83 %-98 %] 95 %  BP: (126-181)/(58-88) 133/58     Weight: 72.2 kg (159 lb 2.8 oz)  Body mass index is 27.32 kg/m².    Intake/Output Summary  (Last 24 hours) at 12/28/17 1522  Last data filed at 12/28/17 1520   Gross per 24 hour   Intake             1658 ml   Output                0 ml   Net             1658 ml      Physical Exam   Constitutional: She is oriented to person, place, and time. She appears well-developed and well-nourished.   HENT:   Head: Normocephalic and atraumatic.   Mouth/Throat: Oropharynx is clear and moist. No oropharyngeal exudate.   Eyes: EOM are normal. Pupils are equal, round, and reactive to light.   Neck: Normal range of motion. Neck supple. No JVD present.   Cardiovascular: Normal rate, regular rhythm, normal heart sounds and intact distal pulses.    No murmur heard.  Pulmonary/Chest: Effort normal and breath sounds normal. No respiratory distress. She has no wheezes.   Abdominal: Soft. Bowel sounds are normal. She exhibits no distension.   Genitourinary:   Genitourinary Comments: Has hawkins   Musculoskeletal: Normal range of motion. She exhibits no edema or tenderness.   Neurological: She is alert and oriented to person, place, and time. She has normal reflexes. No cranial nerve deficit.   Skin: Skin is warm and dry. Capillary refill takes 2 to 3 seconds. No erythema.   Psychiatric: She has a normal mood and affect. Her behavior is normal. Judgment and thought content normal.   Nursing note and vitals reviewed.      Significant Labs: All pertinent labs within the past 24 hours have been reviewed.    Significant Imaging: I have reviewed all pertinent imaging results/findings within the past 24 hours.    Assessment/Plan:      * Acute respiratory failure with hypoxia and hypercarbia    Keep sats> 92%  NIPPV prn  Bronchodilators  Accapella  Solumedrol d/c               Incontinence associated dermatitis              Diabetes mellitus type 2, uncontrolled    Moderate sliding scale   Detemir 15U QHS (home is 21 QHS)  a1c 7.1  Monitor   Diabetic diet           Acute CHF    Appreciate recs from ICU/pulm/cardiology  HFpEF  exacerbation  ECHO reviewed  Responding with diuresis  Preload and afterload reduction  Lisinopril 40 mg          NSTEMI (non-ST elevated myocardial infarction)    Cardiology consulted, appreciate recs  Trop trended down  asa, plavix, statin, imdur  b blockers on hold for relative bradycardia.   Per cardiology, recommendations are for conservative therapy, patient would not desire aggressive management with [facility] and we will continue medical management            Pneumonia of left lower lobe due to infectious organism    Pulmonary consult, appreciate recs  CTA 12/24, images and radiologist interpretation and I personally reviewed   Bilateral infiltrates consistent with PNA  Empiric abx: Zyvox and Zosyn  Methylpred 40mg q6h has been d/c  Sputum culture  blood cultures NGTD 12/19  Swallow eval                  VTE Risk Mitigation         Ordered     heparin (porcine) injection 5,000 Units  Every 8 hours     Route:  Subcutaneous        12/26/17 1000     Medium Risk of VTE  Once      12/22/17 2321     Place sequential compression device  Until discontinued      12/22/17 2321              Ap Feliz MD  Department of Hospital Medicine   [facility]

## 2017-12-28 NOTE — PT/OT/SLP PROGRESS
Speech Language Pathology Treatment    Patient Name:  Olivia Reyes   MRN:  [id number]  Admitting Diagnosis: Acute respiratory failure with hypoxia and hypercarbia    Recommendations:                 General Recommendations:  Dysphagia therapy  Diet recommendations:  Mechanical soft, Chopped meat, Liquid Diet Level: Thin   Aspiration Precautions: 1 bite/sip at a time, Alternating bites/sips, Avoid talking while eating, Double swallow with each bite/sip, HOB to 90 degrees, Remain upright 30 minutes post meal and Small bites/sips   General Precautions: Standard, aspiration  Communication strategies:  none    Subjective     Pt without complaints  Patient goals: none stated    Pain/Comfort:  · Pain Rating 1: 0/10  · Pain Rating Post-Intervention 2: 0/10    Objective:     Has the patient been evaluated by SLP for swallowing?   Yes  Keep patient NPO? No   Current Respiratory Status:        Pt completed laryngeal elevation, tongue base retraction and shaker ex x 15 each with mod cues to improve swallow function. She tolerated sips of thin via straw without difficulty.     Assessment:     Olivia Reyes is a 89 y.o. female with an SLP diagnosis of Dysphagia.  She presents with risk of aspiration.    Goals:    SLP Goals        Problem: SLP Goal    Goal Priority Disciplines Outcome   SLP Goal     SLP Ongoing (interventions implemented as appropriate)   Description:  1. Pt will tolerate least restrictive diet without s/s of aspiration.  2. Pt will complete laryngeal elevation ex x 20 each with min cues to improve swallow function.                     Plan:     · Patient to be seen:  3 x/week   · Plan of Care expires:  01/02/18  · Plan of Care reviewed with:  patient, son   · SLP Follow-Up:  Yes       Discharge recommendations:      Barriers to Discharge:  None    Time Tracking:     SLP Treatment Date:   12/28/17  Speech Start Time:  1138  Speech Stop Time:  1158     Speech Total Time (min):  20 min    Billable Minutes: Treatment  Swallowing Dysfunction 20    Keisha Hu CCC-SLP  12/28/2017

## 2017-12-28 NOTE — PLAN OF CARE
Problem: Patient Care Overview  Goal: Plan of Care Review  Outcome: Ongoing (interventions implemented as appropriate)  Respirations even and unlabored, no distress noted on assessment, vapotherm, no pain or nausea, shortness of breath on exertion, DNR, rectum excoriated non-blanchable

## 2017-12-28 NOTE — CONSULTS
CITLALLI met with patient's son, Michel, at the bedside to discuss Hospice.  CITLALLI provided a list of local Hospice companies.  Michel would like to speak to  in regards to medical condition.      CITLALLI spoke with Dr Feliz and he will meet with Michel to discuss medical condition.    @[id number] CITLALLI met with Michel at the bedside.  He has met with Dr Feliz and is agreeable to an informational visit.  Preference is for [facility].  Choice form completed and placed in patient blue folder.  Referral made to Sammie at [facility] and in [facility] care.

## 2017-12-28 NOTE — ASSESSMENT & PLAN NOTE
Moderate sliding scale   Detemir 15U QHS (home is 21 QHS)  a1c 7.1  Monitor   Diabetic diet     12/28 controlled

## 2017-12-28 NOTE — PROGRESS NOTES
[facility]   Pulmonology  Progress Note    Patient Name: Olivia Reyes  MRN: [id number]  Admission Date: 12/22/2017  Hospital Length of Stay: 6 days  Code Status: DNR  Attending Provider: Ap Feliz MD  Primary Care Provider: Primary Doctor No   Principal Problem: Acute respiratory failure with hypoxia and hypercarbia    Subjective:     Interval History: Seen and examined at bedside. Hospital chart reviewed. No acute interval detrimental events noted.     Objective:     Vital Signs (Most Recent):  Temp: 97.7 °F (36.5 °C) (12/28/17 1101)  Pulse: 66 (12/28/17 1110)  Resp: 18 (12/28/17 1101)  BP: (!) 133/58 (12/28/17 1101)  SpO2: 95 % (12/28/17 1438) Vital Signs (24h Range):  Temp:  [97.1 °F (36.2 °C)-97.9 °F (36.6 °C)] 97.7 °F (36.5 °C)  Pulse:  [52-68] 66  Resp:  [16-22] 18  SpO2:  [83 %-98 %] 95 %  BP: (133-181)/(58-88) 133/58     Weight: 72.2 kg (159 lb 2.8 oz)  Body mass index is 27.32 kg/m².      Intake/Output Summary (Last 24 hours) at 12/28/17 1551  Last data filed at 12/28/17 1520   Gross per 24 hour   Intake             1658 ml   Output                0 ml   Net             1658 ml       Physical Exam   Constitutional: She is oriented to person, place, and time. She appears well-nourished.   HENT:   Head: Normocephalic and atraumatic.   Mouth/Throat: Oropharynx is clear and moist. No oropharyngeal exudate.   Eyes: EOM are normal. Pupils are equal, round, and reactive to light.   Neck: Normal range of motion. Neck supple.   Cardiovascular: Normal rate, regular rhythm and normal heart sounds.    Pulmonary/Chest: Effort normal. She has rales.   Abdominal: Soft. Bowel sounds are normal.   Genitourinary:   Genitourinary Comments: Has hawkins   Musculoskeletal: Normal range of motion.   Neurological: She is alert and oriented to person, place, and time.   Skin: Skin is warm and dry. Capillary refill takes 2 to 3 seconds.   Psychiatric: She has a normal mood and affect.   Nursing note and vitals  reviewed.      Vents:  Oxygen Concentration (%): (S) 55 (12/28/17 1438)    Lines/Drains/Airways     Peripheral Intravenous Line                 Peripheral IV - Single Lumen 12/22/17 Right Forearm 6 days                Significant Labs:    CBC/Anemia Profile:    Recent Labs  Lab 12/27/17  0357 12/28/17  0630   WBC 21.38* 10.10   HGB 12.8 11.6*   HCT 38.1 35.3*   * 287   MCV 90 91   RDW 14.5 14.6*        Chemistries:    Recent Labs  Lab 12/27/17  0357 12/28/17  0630    143   K 3.0* 2.8*    105   CO2 27 27   BUN 42* 28   CREATININE 1.1 0.8   CALCIUM 8.7 8.2*   MG 2.2 2.0       Significant Imaging:     In comparison to the prior study, there is no adverse interval changes.    Assessment/Plan:     * Acute respiratory failure with hypoxia and hypercarbia    Supplement oxygenation. High Flow Oxygen.  Keep SAO2 >= 92%. BIPAP 10/5 QHS and PRN. Bronchodilators per orders.   PREDNISONE.            Diabetes mellitus type 2, uncontrolled    INSULIN ASPART+ INSULIN DETEMIR. Blood glucose target 100 - 180 mg/dl. Levothyroxine 100 mcg PO            Pneumonia of left lower lobe due to infectious organism    IV ZOSYN + ZYVOX.                Dontae Randle MD  Pulmonology  [facility]

## 2017-12-28 NOTE — ASSESSMENT & PLAN NOTE
Pulmonary consult, appreciate recs  CTA 12/24, images and radiologist interpretation and I personally reviewed   Bilateral infiltrates consistent with PNA  Empiric abx: Zyvox and Zosyn  Methylpred 40mg q6h has been d/c  Sputum culture  blood cultures NGTD 12/19  Swallow eval    12/28 - ABX

## 2017-12-28 NOTE — SUBJECTIVE & OBJECTIVE
Interval History: Patient remains in respiratory distress.    Review of Systems   Unable to perform ROS: Severe respiratory distress     Objective:     Vital Signs (Most Recent):  Temp: 97.7 °F (36.5 °C) (12/28/17 1101)  Pulse: 66 (12/28/17 1110)  Resp: 18 (12/28/17 1101)  BP: (!) 133/58 (12/28/17 1101)  SpO2: 95 % (12/28/17 1438) Vital Signs (24h Range):  Temp:  [97.1 °F (36.2 °C)-97.9 °F (36.6 °C)] 97.7 °F (36.5 °C)  Pulse:  [52-68] 66  Resp:  [16-22] 18  SpO2:  [83 %-98 %] 95 %  BP: (126-181)/(58-88) 133/58     Weight: 72.2 kg (159 lb 2.8 oz)  Body mass index is 27.32 kg/m².    Intake/Output Summary (Last 24 hours) at 12/28/17 1522  Last data filed at 12/28/17 1520   Gross per 24 hour   Intake             1658 ml   Output                0 ml   Net             1658 ml      Physical Exam   Constitutional: She is oriented to person, place, and time. She appears well-developed and well-nourished.   HENT:   Head: Normocephalic and atraumatic.   Mouth/Throat: Oropharynx is clear and moist. No oropharyngeal exudate.   Eyes: EOM are normal. Pupils are equal, round, and reactive to light.   Neck: Normal range of motion. Neck supple. No JVD present.   Cardiovascular: Normal rate, regular rhythm, normal heart sounds and intact distal pulses.    No murmur heard.  Pulmonary/Chest: Effort normal and breath sounds normal. No respiratory distress. She has no wheezes.   Abdominal: Soft. Bowel sounds are normal. She exhibits no distension.   Genitourinary:   Genitourinary Comments: Has hawkins   Musculoskeletal: Normal range of motion. She exhibits no edema or tenderness.   Neurological: She is alert and oriented to person, place, and time. She has normal reflexes. No cranial nerve deficit.   Skin: Skin is warm and dry. Capillary refill takes 2 to 3 seconds. No erythema.   Psychiatric: She has a normal mood and affect. Her behavior is normal. Judgment and thought content normal.   Nursing note and vitals reviewed.      Significant  Labs: All pertinent labs within the past 24 hours have been reviewed.    Significant Imaging: I have reviewed all pertinent imaging results/findings within the past 24 hours.

## 2017-12-28 NOTE — PROGRESS NOTES
[facility]  Cardiology  Progress Note    Patient Name: Olivia Reyes  MRN: [id number]  Admission Date: 12/22/2017  Hospital Length of Stay: 6 days  Code Status: DNR   Attending Physician: Ap Feliz MD   Primary Care Physician: Primary Doctor No  Expected Discharge Date:   Principal Problem:Acute respiratory failure with hypoxia and hypercarbia    Subjective:     HPI:  Ms. Reyes is a 89 year old female patient who presented to [facility] ED on 12/23/17 with acute respiratory failure, pneumonia, CHF, and NSTEMI.     Hospital Course:   12/26/17-Patient seen and examined today, resting in bed. States SOB is improving. No chest pain. Troponin trending down. Creatine stable, K slightly low at 3.1. EKG changes have resolved.     12/27/17-Patient seen and examined today, resting in bed. Reports diarrhea. SOB improving, remains on Vapotherm. Labs reviewed. K low at 3.0. Creatine stable.     12/28/17-Patient seen and examined today, lying in bed. Feels weak and fatigued. Also complains of pleuritic chest discomfort, worse with coughing and deep breathing. Labs reviewed. Troponin trending down. CXR and EKG stable.     Interval History: Still feels weak. Complains of pleuritic chest discomfort.    Review of Systems   Constitution: Positive for decreased appetite, weakness and malaise/fatigue.   HENT: Negative.    Eyes: Negative.    Cardiovascular: Positive for chest pain (pleuritic).   Respiratory: Positive for cough and shortness of breath.    Endocrine: Negative.    Skin: Negative.    Musculoskeletal: Negative.    Gastrointestinal: Negative.    Genitourinary: Negative.    Psychiatric/Behavioral: Negative.      Objective:     Vital Signs (Most Recent):  Temp: 97.7 °F (36.5 °C) (12/28/17 1101)  Pulse: 66 (12/28/17 1110)  Resp: 18 (12/28/17 1101)  BP: (!) 133/58 (12/28/17 1101)  SpO2: 95 % (12/28/17 1438) Vital Signs (24h Range):  Temp:  [97.1 °F (36.2 °C)-97.9 °F (36.6 °C)] 97.7 °F (36.5 °C)  Pulse:  [52-68]  66  Resp:  [16-22] 18  SpO2:  [83 %-98 %] 95 %  BP: (126-181)/(58-88) 133/58     Weight: 72.2 kg (159 lb 2.8 oz)  Body mass index is 27.32 kg/m².     SpO2: 95 %  O2 Device (Oxygen Therapy): Vapotherm      Intake/Output Summary (Last 24 hours) at 12/28/17 1450  Last data filed at 12/28/17 1425   Gross per 24 hour   Intake             1138 ml   Output                0 ml   Net             1138 ml       Lines/Drains/Airways     Peripheral Intravenous Line                 Peripheral IV - Single Lumen 12/22/17 Right Forearm 6 days                Physical Exam   Constitutional: She is oriented to person, place, and time. She appears well-developed and well-nourished. No distress.   Appears weak     HENT:   Head: Normocephalic and atraumatic.   Eyes: Pupils are equal, round, and reactive to light. Right eye exhibits no discharge. Left eye exhibits no discharge.   Neck: Neck supple. No JVD present. No tracheal deviation present. No thyromegaly present.   Cardiovascular: Normal rate, regular rhythm, S1 normal, S2 normal and normal heart sounds.  PMI is not displaced.  Exam reveals no gallop, no S3, no S4 and no friction rub.    No murmur heard.  Pulses:       Radial pulses are 2+ on the right side, and 2+ on the left side.   Pulmonary/Chest: Effort normal.   +coarse rhonchi, crackles   Abdominal: Soft. She exhibits no distension. There is no tenderness. There is no rebound.   Musculoskeletal: She exhibits no edema.   Neurological: She is alert and oriented to person, place, and time.   Skin: Skin is warm and dry. She is not diaphoretic. No erythema.   Psychiatric: She has a normal mood and affect. Her behavior is normal. Thought content normal.   Nursing note and vitals reviewed.      Significant Labs:   CMP   Recent Labs  Lab 12/27/17  0357 12/28/17  0630    143   K 3.0* 2.8*    105   CO2 27 27   GLU 38* 127*   BUN 42* 28   CREATININE 1.1 0.8   CALCIUM 8.7 8.2*   ANIONGAP 10 11   ESTGFRAFRICA 51* >60   EGFRNONAA  44* >60   , CBC   Recent Labs  Lab 12/27/17  0357 12/28/17  0630   WBC 21.38* 10.10   HGB 12.8 11.6*   HCT 38.1 35.3*   * 287    and Troponin   Recent Labs  Lab 12/28/17  0706   TROPONINI 0.443*       Significant Imaging: Echocardiogram:   2D echo with color flow doppler:   Results for orders placed or performed during the hospital encounter of 12/22/17   2D echo with color flow doppler   Result Value Ref Range    EF 60 55 - 65    Est. PA Systolic Pressure 39.44     Tricuspid Valve Regurgitation MILD     and X-Ray: CXR: X-Ray Chest PA and Lateral (CXR): No results found for this visit on 12/22/17.    Assessment and Plan:     Brief HPI: Patient with NSTEMI and acute CHF in setting of acute respiratory failure/pneumonia. Still having issues with pleuritic chest discomfort, continue prn pain medications. Continue current cardiac meds. Patient is not on BB secondary to bradycardia. She is DNR and does not wish for any invasive procedures, including LHC. Discussed with patient, her son, and her daughter.     * Acute respiratory failure with hypoxia and hypercarbia    Continue diuretics,dc  IV heparin, continueasa,plavix, nitrates        Acute CHF    -Secondary to respiratory illness and uncontrolled BP  -Diuresed well, BNP declined  -Continue po Lasix, ACEI  -No BB due to bradycardia        NSTEMI (non-ST elevated myocardial infarction)    -Secondary to demand ischemia from pneumonia/respiratory failure  -Discussed with family and patient, she is agreeable to medical management, does not wish for any invasive procedures, including LHC  -Continue ASA, Plavix, norvasc, Lisinopril, statin  -No BB given due to bradycardia  -Having pleuritic chest discomfort, continue pain medication          Pneumonia of left lower lobe due to infectious organism    -Mgmt as per primary team            VTE Risk Mitigation         Ordered     heparin (porcine) injection 5,000 Units  Every 8 hours     Route:  Subcutaneous         12/26/17 1000     Medium Risk of VTE  Once      12/22/17 2321     Place sequential compression device  Until discontinued      12/22/17 2321          Berenice Samuels PA-C  Cardiology  [facility]     Chart reviewed. Dr. Rodriguez examined patient and agrees with plan as outlined above.

## 2017-12-28 NOTE — SUBJECTIVE & OBJECTIVE
Interval History: Still feels weak. Complains of pleuritic chest discomfort.    Review of Systems   Constitution: Positive for decreased appetite, weakness and malaise/fatigue.   HENT: Negative.    Eyes: Negative.    Cardiovascular: Positive for chest pain (pleuritic).   Respiratory: Positive for cough and shortness of breath.    Endocrine: Negative.    Skin: Negative.    Musculoskeletal: Negative.    Gastrointestinal: Negative.    Genitourinary: Negative.    Psychiatric/Behavioral: Negative.      Objective:     Vital Signs (Most Recent):  Temp: 97.7 °F (36.5 °C) (12/28/17 1101)  Pulse: 66 (12/28/17 1110)  Resp: 18 (12/28/17 1101)  BP: (!) 133/58 (12/28/17 1101)  SpO2: 95 % (12/28/17 1438) Vital Signs (24h Range):  Temp:  [97.1 °F (36.2 °C)-97.9 °F (36.6 °C)] 97.7 °F (36.5 °C)  Pulse:  [52-68] 66  Resp:  [16-22] 18  SpO2:  [83 %-98 %] 95 %  BP: (126-181)/(58-88) 133/58     Weight: 72.2 kg (159 lb 2.8 oz)  Body mass index is 27.32 kg/m².     SpO2: 95 %  O2 Device (Oxygen Therapy): Vapotherm      Intake/Output Summary (Last 24 hours) at 12/28/17 1450  Last data filed at 12/28/17 1425   Gross per 24 hour   Intake             1138 ml   Output                0 ml   Net             1138 ml       Lines/Drains/Airways     Peripheral Intravenous Line                 Peripheral IV - Single Lumen 12/22/17 Right Forearm 6 days                Physical Exam   Constitutional: She is oriented to person, place, and time. She appears well-developed and well-nourished. No distress.   Appears weak     HENT:   Head: Normocephalic and atraumatic.   Eyes: Pupils are equal, round, and reactive to light. Right eye exhibits no discharge. Left eye exhibits no discharge.   Neck: Neck supple. No JVD present. No tracheal deviation present. No thyromegaly present.   Cardiovascular: Normal rate, regular rhythm, S1 normal, S2 normal and normal heart sounds.  PMI is not displaced.  Exam reveals no gallop, no S3, no S4 and no friction rub.    No  murmur heard.  Pulses:       Radial pulses are 2+ on the right side, and 2+ on the left side.   Pulmonary/Chest: Effort normal.   +coarse rhonchi, crackles right side   Abdominal: Soft. She exhibits no distension. There is no tenderness. There is no rebound.   Musculoskeletal: She exhibits no edema.   Neurological: She is alert and oriented to person, place, and time.   Skin: Skin is warm and dry. She is not diaphoretic. No erythema.   Psychiatric: She has a normal mood and affect. Her behavior is normal. Thought content normal.   Nursing note and vitals reviewed.      Significant Labs:   CMP   Recent Labs  Lab 12/27/17  0357 12/28/17  0630    143   K 3.0* 2.8*    105   CO2 27 27   GLU 38* 127*   BUN 42* 28   CREATININE 1.1 0.8   CALCIUM 8.7 8.2*   ANIONGAP 10 11   ESTGFRAFRICA 51* >60   EGFRNONAA 44* >60   , CBC   Recent Labs  Lab 12/27/17  0357 12/28/17  0630   WBC 21.38* 10.10   HGB 12.8 11.6*   HCT 38.1 35.3*   * 287    and Troponin   Recent Labs  Lab 12/28/17  0706   TROPONINI 0.443*       Significant Imaging: Echocardiogram:   2D echo with color flow doppler:   Results for orders placed or performed during the hospital encounter of 12/22/17   2D echo with color flow doppler   Result Value Ref Range    EF 60 55 - 65    Est. PA Systolic Pressure 39.44     Tricuspid Valve Regurgitation MILD     and X-Ray: CXR: X-Ray Chest PA and Lateral (CXR): No results found for this visit on 12/22/17.

## 2017-12-28 NOTE — ASSESSMENT & PLAN NOTE
-Secondary to respiratory illness and uncontrolled BP  -Diuresed well, BNP declined  -Continue po Lasix, ACEI  -No BB due to bradycardia

## 2017-12-28 NOTE — PLAN OF CARE
Problem: Patient Care Overview  Goal: Plan of Care Review  Outcome: Ongoing (interventions implemented as appropriate)  Fall precautions maintained, pt free from injuries/fall.  Repositioned in bed using wedge, heels floated.  Skin excoriation noted on buttocks fold and perineum area, some bleeding noted as well. Briefs avoided.  Incontinence care done as needed, barrier cream applied.  Patient has some small liquid bowel movement at the beginning of shift.  C/o some chest tightness at the beginning of the shift, denies any chest pain since.  On vapotherm, RT attempting to wean down, red-tinged sputum noted when pt coughs.  Encouraged food intake; meds given one at a time.  IV abx given as prescribed.  NSR on the monitor.  POC and meds discussed with pt and son, both verbalized understanding.  Pt may need encouragement.  Side rails x 2, bed locked and low, phone and call light w/in reach.  Chart check done. Will cont to monitor.

## 2017-12-28 NOTE — SUBJECTIVE & OBJECTIVE
Interval History: Improving steadily. M/S intact    Review of Systems   Constitutional: Positive for fatigue. Negative for unexpected weight change.        ILL Appearing  Communicative.   HENT: Negative.  Negative for trouble swallowing.    Eyes: Negative.    Respiratory: Positive for shortness of breath. Negative for cough and wheezing.    Cardiovascular: Negative.  Negative for chest pain.        3/6 iker   Gastrointestinal: Positive for diarrhea. Negative for abdominal distention and abdominal pain.   Endocrine: Negative.    Genitourinary: Negative.    Musculoskeletal: Negative.    Skin: Negative.    Allergic/Immunologic: Negative.    Neurological: Positive for weakness. Negative for dizziness.   Hematological: Bruises/bleeds easily.   Psychiatric/Behavioral: Negative.  Negative for agitation, confusion and hallucinations.   All other systems reviewed and are negative.    Objective:     Vital Signs (Most Recent):  Temp: 97.7 °F (36.5 °C) (12/28/17 1101)  Pulse: 66 (12/28/17 1110)  Resp: 18 (12/28/17 1101)  BP: (!) 133/58 (12/28/17 1101)  SpO2: 95 % (12/28/17 1438) Vital Signs (24h Range):  Temp:  [97.1 °F (36.2 °C)-97.9 °F (36.6 °C)] 97.7 °F (36.5 °C)  Pulse:  [52-68] 66  Resp:  [16-22] 18  SpO2:  [83 %-98 %] 95 %  BP: (133-181)/(58-88) 133/58     Weight: 72.2 kg (159 lb 2.8 oz)  Body mass index is 27.32 kg/m².    Intake/Output Summary (Last 24 hours) at 12/28/17 1527  Last data filed at 12/28/17 1520   Gross per 24 hour   Intake             1658 ml   Output                0 ml   Net             1658 ml      Physical Exam    Significant Labs:   BMP:   Recent Labs  Lab 12/28/17  0630   *      K 2.8*      CO2 27   BUN 28   CREATININE 0.8   CALCIUM 8.2*   MG 2.0     CBC:   Recent Labs  Lab 12/27/17  0357 12/28/17  0630   WBC 21.38* 10.10   HGB 12.8 11.6*   HCT 38.1 35.3*   * 287     CMP:   Recent Labs  Lab 12/27/17  0357 12/28/17  0630    143   K 3.0* 2.8*    105   CO2 27 27    GLU 38* 127*   BUN 42* 28   CREATININE 1.1 0.8   CALCIUM 8.7 8.2*   ANIONGAP 10 11   EGFRNONAA 44* >60     All pertinent labs within the past 24 hours have been reviewed.    Significant Imaging: I have reviewed all pertinent imaging results/findings within the past 24 hours.

## 2017-12-28 NOTE — SUBJECTIVE & OBJECTIVE
Interval History: Seen and examined at bedside. Hospital chart reviewed. No acute interval detrimental events noted.     Objective:     Vital Signs (Most Recent):  Temp: 97.7 °F (36.5 °C) (12/28/17 1101)  Pulse: 66 (12/28/17 1110)  Resp: 18 (12/28/17 1101)  BP: (!) 133/58 (12/28/17 1101)  SpO2: 95 % (12/28/17 1438) Vital Signs (24h Range):  Temp:  [97.1 °F (36.2 °C)-97.9 °F (36.6 °C)] 97.7 °F (36.5 °C)  Pulse:  [52-68] 66  Resp:  [16-22] 18  SpO2:  [83 %-98 %] 95 %  BP: (133-181)/(58-88) 133/58     Weight: 72.2 kg (159 lb 2.8 oz)  Body mass index is 27.32 kg/m².      Intake/Output Summary (Last 24 hours) at 12/28/17 1551  Last data filed at 12/28/17 1520   Gross per 24 hour   Intake             1658 ml   Output                0 ml   Net             1658 ml       Physical Exam   Constitutional: She is oriented to person, place, and time. She appears well-nourished.   HENT:   Head: Normocephalic and atraumatic.   Mouth/Throat: Oropharynx is clear and moist. No oropharyngeal exudate.   Eyes: EOM are normal. Pupils are equal, round, and reactive to light.   Neck: Normal range of motion. Neck supple.   Cardiovascular: Normal rate, regular rhythm and normal heart sounds.    Pulmonary/Chest: Effort normal. She has rales.   Abdominal: Soft. Bowel sounds are normal.   Genitourinary:   Genitourinary Comments: Has hawkins   Musculoskeletal: Normal range of motion.   Neurological: She is alert and oriented to person, place, and time.   Skin: Skin is warm and dry. Capillary refill takes 2 to 3 seconds.   Psychiatric: She has a normal mood and affect.   Nursing note and vitals reviewed.      Vents:  Oxygen Concentration (%): (S) 55 (12/28/17 1438)    Lines/Drains/Airways     Peripheral Intravenous Line                 Peripheral IV - Single Lumen 12/22/17 Right Forearm 6 days                Significant Labs:    CBC/Anemia Profile:    Recent Labs  Lab 12/27/17  0357 12/28/17  0630   WBC 21.38* 10.10   HGB 12.8 11.6*   HCT 38.1 35.3*    * 287   MCV 90 91   RDW 14.5 14.6*        Chemistries:    Recent Labs  Lab 12/27/17  0357 12/28/17  0630    143   K 3.0* 2.8*    105   CO2 27 27   BUN 42* 28   CREATININE 1.1 0.8   CALCIUM 8.7 8.2*   MG 2.2 2.0       Significant Imaging:     In comparison to the prior study, there is no adverse interval changes.

## 2017-12-28 NOTE — PLAN OF CARE
Problem: SLP Goal  Goal: SLP Goal  1. Pt will tolerate least restrictive diet without s/s of aspiration.  2. Pt will complete laryngeal elevation ex x 20 each with min cues to improve swallow function.    Outcome: Ongoing (interventions implemented as appropriate)  Pt tolerated sips of thin liquids without overt s/s of aspiration.

## 2017-12-28 NOTE — PROGRESS NOTES
"[facility] Hospital Medicine  Progress Note    Patient Name: Olivia Reyes  MRN: [id number]  Patient Class: IP- Inpatient   Admission Date: 12/22/2017  Length of Stay: 6 days  Attending Physician: Ap Feliz MD  Primary Care Provider: Primary Doctor No        Subjective:     Principal Problem:Acute respiratory failure with hypoxia and hypercarbia    HPI:  Olivia Reyes is a 89 y.o. female patient who presented to the Emergency Department for SOB. Onset gradually 1 week ago. Symptoms constant and worsening. Patient reports over past week that she has been noticing increaed SOB with excursion. Prior treatment neb, evaluation at [facility] after hours, and 1 doxycycline. The  states that the pt "deteriorated 3 hours later", which prompted the ED visit.  No mitigating or exacerbating factors reported. Associated sxs include chest pain and "low grade" fever (99.5). En route, pt was placed on CPAP by EMS.  denies any diaphoresis, dizziness, focal weakness/ numbness, cough, palpitations, leg swelling, n/v/d, and all other sxs at this time. No further complaints or concerns at this time. The patient was evaluated in the Er, CBC neg, CMP notable for hypergycemia and CO2 20. Troponin and BNP elevated. UA neg. ABGs pH7.315, CO2 52.3, PO2 70. Chest Xray: Patchy infiltrates scattered about the left lung.  Question of a mild infiltrate right lung base.  Heart size within normal limits.  Vascular calcification of aorta.  No significant bony findings. The patient was placed on Bipap in ER with improvement in symptoms per patient. The patient admitted to ICU with Bipap for Acute Resp Failure with hypoxemia and hypercap.     Hospital Course:  89 y/u female with PMHx with PMhx for HTN, HLP, DM presents with CHF and possible pneumonia. Troponin trended up and cardiology consulted.   Per Dr. Nolasco, patient with atypical CP and (+) troponin. EKG is unremarkable and echo is normal. Cardiology recommended " continue IV diuresis, IV heparin x 48 hours, asa, plavix, statin, b blockers. Recommendations are for conservative therapy, lexiscan stress once diuresed.  12/24 - CTA today revealed small to moderate bilateral pleural effusions. Extensive bilateral infiltrates/pneumonia. Chronic lung changes. Negative for acute thoracic aortic aneurysm or dissection. Negative for acute pulmonary emboli. On BiPap. Moxi Iv. Acetylcysteine nebs. Hypomagnesemia and hypokalemia. Replacing. BP uncontrolled, increasing amlodipine from 5 mg to 10mg daily. Patient continues on lasix 40mg IV BID with I/O -2472 over last 24 hours. Troponin peaked and now downtrending. Remains on heparin gtt with nitro ointment  12/25 - Patient adequately diuresed and is now having mild RENA. D/c lasix. Hyperglycemia uncontrolled. Increased insulin. Remains in ICU in critical condition. Continuing to treat HF and PNA aggressively.   12/26 - Elevated troponin is from NSTEMI secondary to demand ischemia from pneumonia/respiratory failure. Patient is agreeable to medical management, does not wish for any invasive procedures, including LHC. She is to continue on ASA, Plavix, norvasc, Lisinopril, statin. Will D/C nitropaste, start Imdur 30 mg daily. Will consider BB, will hold off for time being given bradycardia noted this AM.   12/27 - Patient improving slowly.     12/28 - Patient POC discussed with family. Plan for Comfort Care and Hospice referral initiated. Patient communicative, denies discomfort.     Interval History: Improving steadily. M/S intact    Review of Systems   Constitutional: Positive for fatigue. Negative for unexpected weight change.        ILL Appearing  Communicative.   HENT: Negative.  Negative for trouble swallowing.    Eyes: Negative.    Respiratory: Positive for shortness of breath. Negative for cough and wheezing.    Cardiovascular: Negative.  Negative for chest pain.        3/6 iker   Gastrointestinal: Positive for diarrhea. Negative for  abdominal distention and abdominal pain.   Endocrine: Negative.    Genitourinary: Negative.    Musculoskeletal: Negative.    Skin: Negative.    Allergic/Immunologic: Negative.    Neurological: Positive for weakness. Negative for dizziness.   Hematological: Bruises/bleeds easily.   Psychiatric/Behavioral: Negative.  Negative for agitation, confusion and hallucinations.   All other systems reviewed and are negative.    Objective:     Vital Signs (Most Recent):  Temp: 97.7 °F (36.5 °C) (12/28/17 1101)  Pulse: 66 (12/28/17 1110)  Resp: 18 (12/28/17 1101)  BP: (!) 133/58 (12/28/17 1101)  SpO2: 95 % (12/28/17 1438) Vital Signs (24h Range):  Temp:  [97.1 °F (36.2 °C)-97.9 °F (36.6 °C)] 97.7 °F (36.5 °C)  Pulse:  [52-68] 66  Resp:  [16-22] 18  SpO2:  [83 %-98 %] 95 %  BP: (133-181)/(58-88) 133/58     Weight: 72.2 kg (159 lb 2.8 oz)  Body mass index is 27.32 kg/m².    Intake/Output Summary (Last 24 hours) at 12/28/17 1527  Last data filed at 12/28/17 1520   Gross per 24 hour   Intake             1658 ml   Output                0 ml   Net             1658 ml      Physical Exam    Significant Labs:   BMP:   Recent Labs  Lab 12/28/17  0630   *      K 2.8*      CO2 27   BUN 28   CREATININE 0.8   CALCIUM 8.2*   MG 2.0     CBC:   Recent Labs  Lab 12/27/17 0357 12/28/17  0630   WBC 21.38* 10.10   HGB 12.8 11.6*   HCT 38.1 35.3*   * 287     CMP:   Recent Labs  Lab 12/27/17 0357 12/28/17  0630    143   K 3.0* 2.8*    105   CO2 27 27   GLU 38* 127*   BUN 42* 28   CREATININE 1.1 0.8   CALCIUM 8.7 8.2*   ANIONGAP 10 11   EGFRNONAA 44* >60     All pertinent labs within the past 24 hours have been reviewed.    Significant Imaging: I have reviewed all pertinent imaging results/findings within the past 24 hours.    Assessment/Plan:      * Acute respiratory failure with hypoxia and hypercarbia    Keep sats> 92%  NIPPV prn  Bronchodilators  Accapella  Solumedrol d/c               Incontinence associated  dermatitis              Diabetes mellitus type 2, uncontrolled    Moderate sliding scale   Detemir 15U QHS (home is 21 QHS)  a1c 7.1  Monitor   Diabetic diet     12/28 controlled          Acute CHF    Appreciate recs from ICU/pulm/cardiology  HFpEF exacerbation  ECHO reviewed  Responding with diuresis  Preload and afterload reduction  Lisinopril 40 mg          NSTEMI (non-ST elevated myocardial infarction)    Cardiology consulted, appreciate recs  Trop trended down  asa, plavix, statin, imdur  b blockers on hold for relative bradycardia.   Per cardiology, recommendations are for conservative therapy, patient would not desire aggressive management with [facility] and we will continue medical management    12/28 - Comfort measures employed. No aggressive measures per patient and family. Hospice consult initiated.            Pneumonia of left lower lobe due to infectious organism    Pulmonary consult, appreciate recs  CTA 12/24, images and radiologist interpretation and I personally reviewed   Bilateral infiltrates consistent with PNA  Empiric abx: Zyvox and Zosyn  Methylpred 40mg q6h has been d/c  Sputum culture  blood cultures NGTD 12/19  Swallow eval    12/28 - ABX                  VTE Risk Mitigation         Ordered     heparin (porcine) injection 5,000 Units  Every 8 hours     Route:  Subcutaneous        12/26/17 1000     Medium Risk of VTE  Once      12/22/17 2321     Place sequential compression device  Until discontinued      12/22/17 2321              Ap Feliz MD  Department of Hospital Medicine   [facility]

## 2017-12-28 NOTE — ASSESSMENT & PLAN NOTE
Cardiology consulted, appreciate recs  Trop trended down  asa, plavix, statin, imdur  b blockers on hold for relative bradycardia.   Per cardiology, recommendations are for conservative therapy, patient would not desire aggressive management with [facility] and we will continue medical management

## 2017-12-29 VITALS
HEART RATE: 63 BPM | RESPIRATION RATE: 18 BRPM | OXYGEN SATURATION: 96 % | TEMPERATURE: 98 F | HEIGHT: 64 IN | BODY MASS INDEX: 27.1 KG/M2 | WEIGHT: 158.75 LBS | DIASTOLIC BLOOD PRESSURE: 58 MMHG | SYSTOLIC BLOOD PRESSURE: 135 MMHG

## 2017-12-29 PROBLEM — I50.9 ACUTE CHF: Status: RESOLVED | Noted: 2017-12-22 | Resolved: 2017-12-29

## 2017-12-29 LAB
ANION GAP SERPL CALC-SCNC: 8 MMOL/L
BASOPHILS # BLD AUTO: 0 K/UL
BASOPHILS NFR BLD: 0 %
BUN SERPL-MCNC: 29 MG/DL
CALCIUM SERPL-MCNC: 8.1 MG/DL
CHLORIDE SERPL-SCNC: 104 MMOL/L
CO2 SERPL-SCNC: 30 MMOL/L
CREAT SERPL-MCNC: 0.8 MG/DL
DIFFERENTIAL METHOD: ABNORMAL
EOSINOPHIL # BLD AUTO: 0.2 K/UL
EOSINOPHIL NFR BLD: 1.7 %
ERYTHROCYTE [DISTWIDTH] IN BLOOD BY AUTOMATED COUNT: 14.4 %
EST. GFR  (AFRICAN AMERICAN): >60 ML/MIN/1.73 M^2
EST. GFR  (NON AFRICAN AMERICAN): >60 ML/MIN/1.73 M^2
GLUCOSE SERPL-MCNC: 123 MG/DL
HCT VFR BLD AUTO: 29 %
HGB BLD-MCNC: 9.3 G/DL
LYMPHOCYTES # BLD AUTO: 1 K/UL
LYMPHOCYTES NFR BLD: 11.1 %
MAGNESIUM SERPL-MCNC: 2 MG/DL
MCH RBC QN AUTO: 29.2 PG
MCHC RBC AUTO-ENTMCNC: 32.1 G/DL
MCV RBC AUTO: 91 FL
MONOCYTES # BLD AUTO: 0.7 K/UL
MONOCYTES NFR BLD: 7.7 %
NEUTROPHILS # BLD AUTO: 7 K/UL
NEUTROPHILS NFR BLD: 79.5 %
PLATELET # BLD AUTO: 268 K/UL
PMV BLD AUTO: 9.3 FL
POCT GLUCOSE: 109 MG/DL (ref 70–110)
POCT GLUCOSE: 111 MG/DL (ref 70–110)
POCT GLUCOSE: 136 MG/DL (ref 70–110)
POTASSIUM SERPL-SCNC: 2.7 MMOL/L
RBC # BLD AUTO: 3.18 M/UL
SODIUM SERPL-SCNC: 142 MMOL/L
WBC # BLD AUTO: 8.85 K/UL

## 2017-12-29 PROCEDURE — 25000003 PHARM REV CODE 250: Performed by: INTERNAL MEDICINE

## 2017-12-29 PROCEDURE — 83735 ASSAY OF MAGNESIUM: CPT

## 2017-12-29 PROCEDURE — 99232 SBSQ HOSP IP/OBS MODERATE 35: CPT | Mod: ,,, | Performed by: INTERNAL MEDICINE

## 2017-12-29 PROCEDURE — 63600175 PHARM REV CODE 636 W HCPCS: Performed by: INTERNAL MEDICINE

## 2017-12-29 PROCEDURE — 96372 THER/PROPH/DIAG INJ SC/IM: CPT

## 2017-12-29 PROCEDURE — 25000003 PHARM REV CODE 250: Performed by: NURSE PRACTITIONER

## 2017-12-29 PROCEDURE — 27000221 HC OXYGEN, UP TO 24 HOURS

## 2017-12-29 PROCEDURE — 25000003 PHARM REV CODE 250

## 2017-12-29 PROCEDURE — 92526 ORAL FUNCTION THERAPY: CPT

## 2017-12-29 PROCEDURE — 80048 BASIC METABOLIC PNL TOTAL CA: CPT

## 2017-12-29 PROCEDURE — 94664 DEMO&/EVAL PT USE INHALER: CPT

## 2017-12-29 PROCEDURE — 94640 AIRWAY INHALATION TREATMENT: CPT

## 2017-12-29 PROCEDURE — 36415 COLL VENOUS BLD VENIPUNCTURE: CPT

## 2017-12-29 PROCEDURE — 85025 COMPLETE CBC W/AUTO DIFF WBC: CPT

## 2017-12-29 PROCEDURE — 27000173 HC ACAPELLA DEVICE DH OR DM

## 2017-12-29 PROCEDURE — 94761 N-INVAS EAR/PLS OXIMETRY MLT: CPT

## 2017-12-29 PROCEDURE — 25000242 PHARM REV CODE 250 ALT 637 W/ HCPCS: Performed by: INTERNAL MEDICINE

## 2017-12-29 PROCEDURE — 99900035 HC TECH TIME PER 15 MIN (STAT)

## 2017-12-29 PROCEDURE — 25000003 PHARM REV CODE 250: Performed by: PHYSICIAN ASSISTANT

## 2017-12-29 PROCEDURE — 63600175 PHARM REV CODE 636 W HCPCS: Performed by: NURSE PRACTITIONER

## 2017-12-29 RX ORDER — NAPROXEN SODIUM 220 MG/1
81 TABLET, FILM COATED ORAL DAILY
Refills: 0 | COMMUNITY
Start: 2017-12-30 | End: 2018-12-30

## 2017-12-29 RX ORDER — NITROGLYCERIN 0.4 MG/1
0.4 TABLET SUBLINGUAL EVERY 5 MIN PRN
Status: DISCONTINUED | OUTPATIENT
Start: 2017-12-29 | End: 2017-12-29 | Stop reason: HOSPADM

## 2017-12-29 RX ORDER — SULFAMETHOXAZOLE AND TRIMETHOPRIM 800; 160 MG/1; MG/1
1 TABLET ORAL 2 TIMES DAILY
Status: DISCONTINUED | OUTPATIENT
Start: 2017-12-29 | End: 2017-12-29 | Stop reason: HOSPADM

## 2017-12-29 RX ORDER — IBUPROFEN 600 MG/1
600 TABLET ORAL ONCE
Status: COMPLETED | OUTPATIENT
Start: 2017-12-29 | End: 2017-12-29

## 2017-12-29 RX ORDER — SULFAMETHOXAZOLE AND TRIMETHOPRIM 800; 160 MG/1; MG/1
1 TABLET ORAL 2 TIMES DAILY
Qty: 10 TABLET | Refills: 0 | Status: SHIPPED | OUTPATIENT
Start: 2017-12-29

## 2017-12-29 RX ORDER — POTASSIUM CHLORIDE 20 MEQ/1
40 TABLET, EXTENDED RELEASE ORAL EVERY 4 HOURS
Status: DISCONTINUED | OUTPATIENT
Start: 2017-12-29 | End: 2017-12-29 | Stop reason: HOSPADM

## 2017-12-29 RX ADMIN — PANTOPRAZOLE SODIUM 40 MG: 40 TABLET, DELAYED RELEASE ORAL at 10:12

## 2017-12-29 RX ADMIN — LINEZOLID 600 MG: 2 INJECTION, SOLUTION INTRAVENOUS at 01:12

## 2017-12-29 RX ADMIN — POTASSIUM CHLORIDE 40 MEQ: 1500 TABLET, EXTENDED RELEASE ORAL at 10:12

## 2017-12-29 RX ADMIN — IPRATROPIUM BROMIDE AND ALBUTEROL SULFATE 3 ML: .5; 3 SOLUTION RESPIRATORY (INHALATION) at 09:12

## 2017-12-29 RX ADMIN — SERTRALINE HYDROCHLORIDE 50 MG: 50 TABLET ORAL at 10:12

## 2017-12-29 RX ADMIN — NITROGLYCERIN 1 INCH: 20 OINTMENT TOPICAL at 05:12

## 2017-12-29 RX ADMIN — HEPARIN SODIUM 5000 UNITS: 5000 INJECTION, SOLUTION INTRAVENOUS; SUBCUTANEOUS at 05:12

## 2017-12-29 RX ADMIN — PIPERACILLIN AND TAZOBACTAM 4.5 G: 4; .5 INJECTION, POWDER, FOR SOLUTION INTRAVENOUS at 04:12

## 2017-12-29 RX ADMIN — LEVOTHYROXINE SODIUM 100 MCG: 100 TABLET ORAL at 05:12

## 2017-12-29 RX ADMIN — ZINC OXIDE: 200 PASTE TOPICAL at 10:12

## 2017-12-29 RX ADMIN — ISOSORBIDE MONONITRATE 30 MG: 30 TABLET, EXTENDED RELEASE ORAL at 10:12

## 2017-12-29 RX ADMIN — SULFAMETHOXAZOLE AND TRIMETHOPRIM 1 TABLET: 800; 160 TABLET ORAL at 11:12

## 2017-12-29 RX ADMIN — NITROGLYCERIN 0.4 MG: 0.4 TABLET SUBLINGUAL at 01:12

## 2017-12-29 RX ADMIN — BALSALAZIDE DISODIUM 750 MG: 750 CAPSULE ORAL at 05:12

## 2017-12-29 RX ADMIN — POTASSIUM CHLORIDE 40 MEQ: 1500 TABLET, EXTENDED RELEASE ORAL at 05:12

## 2017-12-29 RX ADMIN — IBUPROFEN 600 MG: 600 TABLET, FILM COATED ORAL at 01:12

## 2017-12-29 RX ADMIN — POTASSIUM CHLORIDE 40 MEQ: 1500 TABLET, EXTENDED RELEASE ORAL at 06:12

## 2017-12-29 RX ADMIN — NITROGLYCERIN 1 INCH: 20 OINTMENT TOPICAL at 11:12

## 2017-12-29 RX ADMIN — AMLODIPINE BESYLATE 10 MG: 10 TABLET ORAL at 10:12

## 2017-12-29 NOTE — ASSESSMENT & PLAN NOTE
Cardiology consulted, appreciate robert  Trop trended down  asa, plavix, statin, imdur  b blockers on hold for relative bradycardia.   Per cardiology, recommendations are for conservative therapy, patient would not desire aggressive management with [facility] and we will continue medical management    12/28 - Comfort measures employed. No aggressive measures per patient and family. Hospice consult initiated.    12/29 - No Plavix due to bleeding. ASA

## 2017-12-29 NOTE — DISCHARGE SUMMARY
"[facility] Hospital Medicine  Discharge Summary      Patient Name: Olivia Reyes  MRN: [id number]  Admission Date: 12/22/2017  Hospital Length of Stay: 7 days  Discharge Date and Time: No discharge date for patient encounter.  Attending Physician: Ap Feliz MD   Discharging Provider: Ap Feliz MD  Primary Care Provider: Primary Doctor No      HPI:   Olivia Reyes is a 89 y.o. female patient who presented to the Emergency Department for SOB. Onset gradually 1 week ago. Symptoms constant and worsening. Patient reports over past week that she has been noticing increaed SOB with excursion. Prior treatment neb, evaluation at [facility] after hours, and 1 doxycycline. The  states that the pt "deteriorated 3 hours later", which prompted the ED visit.  No mitigating or exacerbating factors reported. Associated sxs include chest pain and "low grade" fever (99.5). En route, pt was placed on CPAP by EMS.  denies any diaphoresis, dizziness, focal weakness/ numbness, cough, palpitations, leg swelling, n/v/d, and all other sxs at this time. No further complaints or concerns at this time. The patient was evaluated in the Er, CBC neg, CMP notable for hypergycemia and CO2 20. Troponin and BNP elevated. UA neg. ABGs pH7.315, CO2 52.3, PO2 70. Chest Xray: Patchy infiltrates scattered about the left lung.  Question of a mild infiltrate right lung base.  Heart size within normal limits.  Vascular calcification of aorta.  No significant bony findings. The patient was placed on Bipap in ER with improvement in symptoms per patient. The patient admitted to ICU with Bipap for Acute Resp Failure with hypoxemia and hypercap.     * No surgery found *      Hospital Course:   89 y/u female with PMHx with PMhx for HTN, HLP, DM presents with CHF and possible pneumonia. Troponin trended up and cardiology consulted.   Per Dr. Nolasco, patient with atypical CP and (+) troponin. EKG is unremarkable and echo is " normal. Cardiology recommended continue IV diuresis, IV heparin x 48 hours, asa, plavix, statin, b blockers. Recommendations are for conservative therapy, lexiscan stress once diuresed.  12/24 - CTA today revealed small to moderate bilateral pleural effusions. Extensive bilateral infiltrates/pneumonia. Chronic lung changes. Negative for acute thoracic aortic aneurysm or dissection. Negative for acute pulmonary emboli. On BiPap. Moxi Iv. Acetylcysteine nebs. Hypomagnesemia and hypokalemia. Replacing. BP uncontrolled, increasing amlodipine from 5 mg to 10mg daily. Patient continues on lasix 40mg IV BID with I/O -2472 over last 24 hours. Troponin peaked and now downtrending. Remains on heparin gtt with nitro ointment  12/25 - Patient adequately diuresed and is now having mild RENA. D/c lasix. Hyperglycemia uncontrolled. Increased insulin. Remains in ICU in critical condition. Continuing to treat HF and PNA aggressively.   12/26 - Elevated troponin is from NSTEMI secondary to demand ischemia from pneumonia/respiratory failure. Patient is agreeable to medical management, does not wish for any invasive procedures, including LHC. She is to continue on ASA, Plavix, norvasc, Lisinopril, statin. Will D/C nitropaste, start Imdur 30 mg daily. Will consider BB, will hold off for time being given bradycardia noted this AM.   12/27 - Patient improving slowly.     12/28 - Patient POC discussed with family. Plan for Comfort Care and Hospice referral initiated. Patient communicative, denies discomfort.     12/29 - Patient 89 yo female admitted to hospital for SOB found to have Pneumonia and having experienced a NSTEMI. Patient initiated on broad spectrum antibiotics and treated symptomatically. Patient evaluated by Cardiology. Patient family POA directing no invasive measures and patients care directed as medical management. Patient experiencing significant bruising related to anticoagulation and Plavix has been d/cd prior to her  discharge. Patient family met with hospice and have selected [facility] for continued comfort care. The patient was seen and examined today prior to her discharge to hospice care.     Consults:   Consults         Status Ordering Provider     Inpatient consult to Cardiology  Once     Provider:  Xuan Rodriguez MD    Completed ALVIN STERN JR     Inpatient consult to Pulmonology  Once     Provider:  Braxton Bonilla MD    Acknowledged ALVIN STERN JR     Inpatient consult to Social Work  Once     Provider:  (Not yet assigned)    Completed CARLOS MURILLO     Inpatient consult to Social Work  Once     Provider:  (Not yet assigned)    Completed TANI REY          * Acute respiratory failure with hypoxia and hypercarbia    Keep sats> 92%  NIPPV prn  Bronchodilators  Accapella  Solumedrol d/c    12/29 - O2 via [facility]               Incontinence associated dermatitis    12/29 - Comfort care / Wound Care          Diabetes mellitus type 2, uncontrolled    Moderate sliding scale   Detemir 15U QHS (home is 21 QHS)  a1c 7.1  Monitor   Diabetic diet     12/28 controlled    12/29 - Levemir / NISS        NSTEMI (non-ST elevated myocardial infarction)    Cardiology consulted, appreciate recs  Trop trended down  asa, plavix, statin, imdur  b blockers on hold for relative bradycardia.   Per cardiology, recommendations are for conservative therapy, patient would not desire aggressive management with [facility] and we will continue medical management    12/28 - Comfort measures employed. No aggressive measures per patient and family. Hospice consult initiated.    12/29 - No Plavix due to bleeding. ASA            Pneumonia of left lower lobe due to infectious organism    Pulmonary consult, appreciate recs  CTA 12/24, images and radiologist interpretation and I personally reviewed   Bilateral infiltrates consistent with PNA  Empiric abx: Zyvox and Zosyn  Methylpred 40mg q6h has been d/c  Sputum  culture  blood cultures NGTD 12/19  Swallow eval    12/28 - ABX    12/29 - ABX                  Final Active Diagnoses:    Diagnosis Date Noted POA    PRINCIPAL PROBLEM:  Acute respiratory failure with hypoxia and hypercarbia [J96.01, J96.02] 12/22/2017 Yes    Incontinence associated dermatitis [L30.8, R32] 12/28/2017 Yes    Diabetes mellitus type 2, uncontrolled [E11.65] 12/23/2017 Yes    Pneumonia of left lower lobe due to infectious organism [J18.1] 12/22/2017 Yes    NSTEMI (non-ST elevated myocardial infarction) [I21.4] 12/22/2017 Yes      Problems Resolved During this Admission:    Diagnosis Date Noted Date Resolved POA    Acute CHF [I50.9] 12/22/2017 12/29/2017 Yes       Discharged Condition: critical    Disposition: Home or Self Care    Follow Up:  Follow-up Information      [facility].    Specialty:  Hospice Services  Contact information:  Jason SALAZAR  [phone]                 Patient Instructions:     Diet Diabetic 2000 Calories     Activity as tolerated         Significant Diagnostic Studies: Labs:   BMP:   Recent Labs  Lab 12/28/17 0630 12/29/17  0446   * 123*    142   K 2.8* 2.7*    104   CO2 27 30*   BUN 28 29   CREATININE 0.8 0.8   CALCIUM 8.2* 8.1*   MG 2.0 2.0   , CMP   Recent Labs  Lab 12/28/17 0630 12/29/17  0446    142   K 2.8* 2.7*    104   CO2 27 30*   * 123*   BUN 28 29   CREATININE 0.8 0.8   CALCIUM 8.2* 8.1*   ANIONGAP 11 8   ESTGFRAFRICA >60 >60   EGFRNONAA >60 >60   , CBC   Recent Labs  Lab 12/28/17  0630 12/29/17  0446   WBC 10.10 8.85   HGB 11.6* 9.3*   HCT 35.3* 29.0*    268    and Troponin   Recent Labs  Lab 12/28/17  0706   TROPONINI 0.443*       Pending Diagnostic Studies:     Procedure Component Value Units Date/Time    Lactic acid, plasma #2 [[id number]] Collected:  12/23/17 0539    Order Status:  Sent Lab Status:  In process Updated:  12/23/17 0540    Specimen:  Blood from Blood          Medications:  Reconciled  Home Medications:   Current Discharge Medication List      START taking these medications    Details   aspirin 81 MG Chew Take 1 tablet (81 mg total) by mouth once daily.  Refills: 0      sulfamethoxazole-trimethoprim 800-160mg (BACTRIM DS) 800-160 mg Tab Take 1 tablet by mouth 2 (two) times daily.  Qty: 10 tablet, Refills: 0         CONTINUE these medications which have NOT CHANGED    Details   ALBUTEROL INHL Inhale 2 puffs into the lungs every 4 (four) hours as needed.      amLODIPine (NORVASC) 2.5 MG tablet Take 5 mg by mouth once daily.      balsalazide (COLAZAL) 750 mg capsule Take 750 mg by mouth 3 (three) times daily.      BUDESONIDE ORAL Take by mouth once daily.      cholecalciferol, vitamin D3, 2,000 unit Cap Take 2,000 Units by mouth once daily.      fluticasone (FLOVENT HFA) 110 mcg/actuation inhaler Inhale 2 puffs into the lungs 2 (two) times daily. Controller      hydroCHLOROthiazide (HYDRODIURIL) 25 MG tablet Take 25 mg by mouth once daily.      insulin aspart (NOVOLOG FLEXPEN) 100 unit/mL InPn pen Inject into the skin 3 (three) times daily with meals. 4-6/am, 4-7/noon, 5-8/pm      insulin glargine (LANTUS) 100 unit/mL injection Inject 21 Units into the skin every evening.      levothyroxine (SYNTHROID) 100 MCG tablet Take 100 mcg by mouth once daily.      lisinopril (PRINIVIL,ZESTRIL) 40 MG tablet Take 40 mg by mouth every evening.      loratadine (CLARITIN) 10 mg tablet Take 10 mg by mouth every evening.      memantine (NAMENDA) 5 MG Tab Take 5 mg by mouth every evening.      methylcellulose oral powder Take 3.4 g by mouth once daily.      omeprazole (PRILOSEC) 20 MG capsule Take 20 mg by mouth 2 (two) times daily.      sertraline (ZOLOFT) 50 MG tablet Take 50 mg by mouth once daily.      simvastatin (ZOCOR) 20 MG tablet Take 20 mg by mouth every evening.         STOP taking these medications       conjugated estrogens (PREMARIN) vaginal cream Comments:   Reason for Stopping:         doxycycline  (DORYX) 100 MG EC tablet Comments:   Reason for Stopping:               Indwelling Lines/Drains at time of discharge:   Lines/Drains/Airways          No matching active lines, drains, or airways          Time spent on the discharge of patient: 35 minutes  Patient was seen and examined on the date of discharge and determined to be suitable for discharge.         Ap Feliz MD  Department of Hospital Medicine  [facility]

## 2017-12-29 NOTE — NURSING
Report called to Carlos Manuel MATTA from [facility]. Per nurse, keep IV for transport.

## 2017-12-29 NOTE — PROGRESS NOTES
[facility]   Pulmonology  Progress Note    Patient Name: Olivia Reyes  MRN: [id number]  Admission Date: 12/22/2017  Hospital Length of Stay: 7 days  Code Status: DNR  Attending Provider: Ap Feliz MD  Primary Care Provider: Primary Doctor No   Principal Problem: Acute respiratory failure with hypoxia and hypercarbia    Subjective:     Interval History: Seen and examined at bedside. Hospital chart reviewed. No acute interval detrimental events noted. More awake today. She reports that she is slightly better today.       Scheduled Meds:   amLODIPine  10 mg Oral Daily    aspirin  81 mg Oral Daily    balsalazide  750 mg Oral TID    clopidogrel  75 mg Oral Daily    insulin detemir  15 Units Subcutaneous QHS    isosorbide mononitrate  30 mg Oral Daily    levothyroxine  100 mcg Oral Before breakfast    lisinopril  40 mg Oral QHS    memantine  5 mg Oral QHS    nitroGLYCERIN 2% TD oint  1 inch Topical (Top) Q6H    pantoprazole  40 mg Oral Daily    potassium chloride  40 mEq Oral Q4H    sertraline  50 mg Oral Daily    simvastatin  20 mg Oral QHS    sulfamethoxazole-trimethoprim 800-160mg  1 tablet Oral BID    zinc oxide-petrolatum   Topical (Top) BID     Continuous Infusions:  PRN Meds:.acetaminophen, albuterol-ipratropium 2.5mg-0.5mg/3mL, dextrose 50%, dextrose 50%, glucagon (human recombinant), glucose, glucose, hydrALAZINE, insulin aspart, nitroGLYCERIN       Objective:     Vital Signs (Most Recent):  Temp: 97.9 °F (36.6 °C) (12/29/17 0707)  Pulse: 63 (12/29/17 0919)  Resp: 18 (12/29/17 0919)  BP: (!) 135/58 (12/29/17 0901)  SpO2: 96 % (12/29/17 0919) Vital Signs (24h Range):  Temp:  [97.6 °F (36.4 °C)-98.1 °F (36.7 °C)] 97.9 °F (36.6 °C)  Pulse:  [58-64] 63  Resp:  [18-20] 18  SpO2:  [92 %-97 %] 96 %  BP: (135-199)/(58-76) 135/58     Weight: 72 kg (158 lb 11.7 oz)  Body mass index is 27.25 kg/m².      Intake/Output Summary (Last 24 hours) at 12/29/17 1259  Last data filed at 12/29/17  1234   Gross per 24 hour   Intake             2340 ml   Output                0 ml   Net             2340 ml       Physical Exam   Constitutional: She is oriented to person, place, and time. She appears well-nourished.   HENT:   Head: Normocephalic and atraumatic.   Mouth/Throat: Oropharynx is clear and moist. No oropharyngeal exudate.   Eyes: EOM are normal. Pupils are equal, round, and reactive to light.   Neck: Normal range of motion. Neck supple.   Cardiovascular: Normal rate, regular rhythm and normal heart sounds.    Pulmonary/Chest: Effort normal. She has rales.   Abdominal: Soft. Bowel sounds are normal.   Genitourinary:   Genitourinary Comments: Has hawkins   Musculoskeletal: Normal range of motion.   Neurological: She is alert and oriented to person, place, and time.   Skin: Skin is warm and dry. Capillary refill takes 2 to 3 seconds.   Psychiatric: She has a normal mood and affect.   Nursing note and vitals reviewed.      Vents:  Oxygen Concentration (%): 40 (12/29/17 0919)    Lines/Drains/Airways     Peripheral Intravenous Line                 Peripheral IV - Single Lumen 12/22/17 Right Forearm 7 days                Significant Labs:    CBC/Anemia Profile:    Recent Labs  Lab 12/28/17  0630 12/29/17  0446   WBC 10.10 8.85   HGB 11.6* 9.3*   HCT 35.3* 29.0*    268   MCV 91 91   RDW 14.6* 14.4        Chemistries:    Recent Labs  Lab 12/28/17  0630 12/29/17  0446    142   K 2.8* 2.7*    104   CO2 27 30*   BUN 28 29   CREATININE 0.8 0.8   CALCIUM 8.2* 8.1*   MG 2.0 2.0       Significant Imaging:     In comparison to the prior study, there is no adverse interval changes.    Assessment/Plan:     * Acute respiratory failure with hypoxia and hypercarbia    Supplement oxygenation. High Flow Oxygen.  Keep SAO2 >= 92%. BIPAP 10/5 QHS and PRN. Bronchodilators per orders.   PREDNISONE.            Pneumonia of left lower lobe due to infectious organism    ORAL BACTRIM.          PATIENT CURRENTLY  TRANSITIONING TO HOSPICE.    Will sign off for now. Please feel free to re consult if further pulmonary issues arise.     Dontae Randle MD  Pulmonology  [facility]

## 2017-12-29 NOTE — NURSING
"[facility] but cannot [facility] patient because "she is not bed bound" despite hospice status. Called Sammie from [facility], she spoke with [facility]. Final decision is [facility] is going provide transportation.  "

## 2017-12-29 NOTE — PT/OT/SLP PROGRESS
Speech Language Pathology Treatment    Patient Name:  Olivia Reyes   MRN:  [id number]  Admitting Diagnosis: Acute respiratory failure with hypoxia and hypercarbia    Recommendations:                 General Recommendations:  Dysphagia therapy  Diet recommendations:  Mechanical soft, Chopped meat, Liquid Diet Level: Thin   Aspiration Precautions: 1 bite/sip at a time, Alternating bites/sips, HOB to 90 degrees and Small bites/sips   General Precautions: Standard, aspiration  Communication strategies:  none    Subjective     Pt reports using her swallow strategies during meals without difficulty.  Patient goals: none stated    Pain/Comfort:  · Pain Rating 1: 0/10  · Pain Rating Post-Intervention 2: 0/10    Objective:     Has the patient been evaluated by SLP for swallowing?   Yes  Keep patient NPO? No   Current Respiratory Status: nasal cannula      Pt reports consistent use of swallow strategies without overt s/s of aspiration with meals. She completed tongue base retraction and laryngeal elevation ex x 15 each with mod cues to improve swallow function.     Assessment:     Olivia Reyes is a 89 y.o. female with an SLP diagnosis of Dysphagia.  She presents with risk of aspiration.    Goals:    SLP Goals        Problem: SLP Goal    Goal Priority Disciplines Outcome   SLP Goal     SLP Ongoing (interventions implemented as appropriate)   Description:  1. Pt will tolerate least restrictive diet without s/s of aspiration.  2. Pt will complete laryngeal elevation ex x 20 each with min cues to improve swallow function.                     Plan:     · Patient to be seen:  3 x/week   · Plan of Care expires:  01/02/18  · Plan of Care reviewed with:  patient   · SLP Follow-Up:  Yes       Discharge recommendations:      Barriers to Discharge:  None    Time Tracking:     SLP Treatment Date:   12/29/17  Speech Start Time:  0941  Speech Stop Time:  0952     Speech Total Time (min):  11 min    Billable Minutes: Treatment Swallowing  Dysfunction 11    Keisha Hu CCC-SLP  12/29/2017

## 2017-12-29 NOTE — PROGRESS NOTES
Critical lab value of potassium 2.7. HELENA Garcia notified. New order for PO potassium tablet 40meq Q4H x4 doses

## 2017-12-29 NOTE — PHYSICIAN QUERY
"PT Name: Olivia Reyes  MR #: [id number]    Physician Query Form - Heart  Condition Clarification     CDS/: Briana Chavez RN CDI     Contact information: [phone]  This form is a permanent document in the medical record.     Query Date: December 29, 2017    By submitting this query, we are merely seeking further clarification of documentation. Please utilize your independent clinical judgment when addressing the question(s) below.    The medical record contains the following   Indicators     Supporting Clinical Findings Location in Medical Record   X    Labs 12/28   X EF 60-65%   Echo report 12/23   X Radiology findings Patchy infiltrates scattered about the left lung.  Question of a mild infiltrate right lung base.  Heart size within normal limits.  Vascular calcification of aorta.  No significant bony findings.   Chest xray 12/22   X Echo Results Concentric hypertrophy.     - No wall motion abnormalities.     - Normal left ventricular systolic function (EF 60-65%).     - Normal right ventricular systolic function .     - The estimated PA systolic pressure is greater than 39 mmHg.     - Mild tricuspid regurgitation.  Right Ventricle: The right ventricle is normal in size. Global right ventricular systolic function appears normal. Tricuspid annular plane systolic excursion (TAPSE) is 2.1 cm. The estimated PA systolic pressure is greater than 39 mmHg.      Echo report 12/23    "Ascites" documented     X "SOB" or "VELIZ" documented SOB worsening for 1 week.   H&P   X "Hypoxia" documented Acute respiratory failure with hypoxia and hypercarbia.   H&P   X Heart Failure documented Acute CHF + PNE   H&P    "Edema" documented      Diuretics/Meds     X Treatment: Diruesis  Echo  Trend troponin  Monitor   Continue ACE  Consider consult to cardiology      H&P    Other:          Provider, please specify diagnosis or diagnoses associated with above clinical findings.                               [  ] Acute " Systolic Heart Failure ( EF < 40)*  [  ] Acute on Chronic Systolic Heart Failure ( EF < 40)*  [  ] Chronic Systolic Heart Failure (EF < 40)*  [  ] Acute Diastolic Heart Failure ( EF > 40)*  [  ] Acute on Chronic Diastolic Heart Failure( EF > 40)*  [  ] Chronic Diastolic Heart Failure (EF > 40)*  [  ] Acute Combined Systolic and Diastolic Heart Failure  [  ] Acute on Chronic Combined Systolic and Diastolic Heart Failure  [  ] Chronic Combined Systolic and Diastolic Heart Failure  [  ] Other Type of Heart Failure (please specify type): _________________________  [ x ] Heart Failure Ruled Out  [  ] Other (please specify): ___________________________________  [  ] Clinically Undetermined            *American Heart Association                                                                                                          Please document in your progress notes daily for the duration of treatment until resolved and include in your discharge summary.

## 2017-12-29 NOTE — ASSESSMENT & PLAN NOTE
Pulmonary consult, appreciate recs  CTA 12/24, images and radiologist interpretation and I personally reviewed   Bilateral infiltrates consistent with PNA  Empiric abx: Zyvox and Zosyn  Methylpred 40mg q6h has been d/c  Sputum culture  blood cultures NGTD 12/19  Swallow eval    12/28 - ABX    12/29 - ABX

## 2017-12-29 NOTE — PLAN OF CARE
Problem: SLP Goal  Goal: SLP Goal  1. Pt will tolerate least restrictive diet without s/s of aspiration.  2. Pt will complete laryngeal elevation ex x 20 each with min cues to improve swallow function.    Outcome: Ongoing (interventions implemented as appropriate)  Pt completed swallow ex x 15 each with mod cues.

## 2017-12-29 NOTE — PLAN OF CARE
CITLALLI received call from Sammie with [facility].  Number for report [phone] given to primary nurse Ariana.  CITLALLI contacted Ariana and she will call report to [place].  Once report has been called Ariana will contact Sammie @[phone] to arrange for transportation to [facility].  CITLALLI sent AVS/Discharge order to [facility] via [facility].     12/29/17 1134   Final Note   Assessment Type Final Discharge Note   Discharge Disposition [facility]   Right Care Referral Info   Facility Name [facility]

## 2017-12-29 NOTE — ASSESSMENT & PLAN NOTE
Moderate sliding scale   Detemir 15U QHS (home is 21 QHS)  a1c 7.1  Monitor   Diabetic diet     12/28 controlled    12/29 - Levemir / NISS

## 2017-12-29 NOTE — PLAN OF CARE
Problem: Patient Care Overview  Goal: Plan of Care Review  Outcome: Ongoing (interventions implemented as appropriate)  Fall prevention precautions maintained, pt remained free of falls throughout shift, call bell and personal items within reach. 24 hour chart check completed. Will continue to monitor

## 2017-12-29 NOTE — PROGRESS NOTES
Pt complained to chest pain to right chest. /76. Notified HELENA Garcia. New order for nitro SL and ibuprofen ordered. Pt denied chest pan and BP down to 136/66 post nitro SLx1 and ibuprofen 600mg.

## 2017-12-29 NOTE — SUBJECTIVE & OBJECTIVE
Interval History: Seen and examined at bedside. Hospital chart reviewed. No acute interval detrimental events noted. More awake today. She reports that she is slightly better today.       Scheduled Meds:   amLODIPine  10 mg Oral Daily    aspirin  81 mg Oral Daily    balsalazide  750 mg Oral TID    clopidogrel  75 mg Oral Daily    insulin detemir  15 Units Subcutaneous QHS    isosorbide mononitrate  30 mg Oral Daily    levothyroxine  100 mcg Oral Before breakfast    lisinopril  40 mg Oral QHS    memantine  5 mg Oral QHS    nitroGLYCERIN 2% TD oint  1 inch Topical (Top) Q6H    pantoprazole  40 mg Oral Daily    potassium chloride  40 mEq Oral Q4H    sertraline  50 mg Oral Daily    simvastatin  20 mg Oral QHS    sulfamethoxazole-trimethoprim 800-160mg  1 tablet Oral BID    zinc oxide-petrolatum   Topical (Top) BID     Continuous Infusions:  PRN Meds:.acetaminophen, albuterol-ipratropium 2.5mg-0.5mg/3mL, dextrose 50%, dextrose 50%, glucagon (human recombinant), glucose, glucose, hydrALAZINE, insulin aspart, nitroGLYCERIN       Objective:     Vital Signs (Most Recent):  Temp: 97.9 °F (36.6 °C) (12/29/17 0707)  Pulse: 63 (12/29/17 0919)  Resp: 18 (12/29/17 0919)  BP: (!) 135/58 (12/29/17 0901)  SpO2: 96 % (12/29/17 0919) Vital Signs (24h Range):  Temp:  [97.6 °F (36.4 °C)-98.1 °F (36.7 °C)] 97.9 °F (36.6 °C)  Pulse:  [58-64] 63  Resp:  [18-20] 18  SpO2:  [92 %-97 %] 96 %  BP: (135-199)/(58-76) 135/58     Weight: 72 kg (158 lb 11.7 oz)  Body mass index is 27.25 kg/m².      Intake/Output Summary (Last 24 hours) at 12/29/17 1256  Last data filed at 12/29/17 1234   Gross per 24 hour   Intake             2340 ml   Output                0 ml   Net             2340 ml       Physical Exam   Constitutional: She is oriented to person, place, and time. She appears well-nourished.   HENT:   Head: Normocephalic and atraumatic.   Mouth/Throat: Oropharynx is clear and moist. No oropharyngeal exudate.   Eyes: EOM are  normal. Pupils are equal, round, and reactive to light.   Neck: Normal range of motion. Neck supple.   Cardiovascular: Normal rate, regular rhythm and normal heart sounds.    Pulmonary/Chest: Effort normal. She has rales.   Abdominal: Soft. Bowel sounds are normal.   Genitourinary:   Genitourinary Comments: Has hawkins   Musculoskeletal: Normal range of motion.   Neurological: She is alert and oriented to person, place, and time.   Skin: Skin is warm and dry. Capillary refill takes 2 to 3 seconds.   Psychiatric: She has a normal mood and affect.   Nursing note and vitals reviewed.      Vents:  Oxygen Concentration (%): 40 (12/29/17 0919)    Lines/Drains/Airways     Peripheral Intravenous Line                 Peripheral IV - Single Lumen 12/22/17 Right Forearm 7 days                Significant Labs:    CBC/Anemia Profile:    Recent Labs  Lab 12/28/17  0630 12/29/17  0446   WBC 10.10 8.85   HGB 11.6* 9.3*   HCT 35.3* 29.0*    268   MCV 91 91   RDW 14.6* 14.4        Chemistries:    Recent Labs  Lab 12/28/17  0630 12/29/17  0446    142   K 2.8* 2.7*    104   CO2 27 30*   BUN 28 29   CREATININE 0.8 0.8   CALCIUM 8.2* 8.1*   MG 2.0 2.0       Significant Imaging:     In comparison to the prior study, there is no adverse interval changes.

## 2017-12-29 NOTE — NURSING
Attempted to call report to [facility]. Per , the nurse receiving, PAXTON Horowitz, is currently with another patient and will call back. Call back number given.

## 2017-12-29 NOTE — NURSING
Notified Sammie from [facility] that report has been called, she will set up transportation for 2 pm.

## 2017-12-29 NOTE — NURSING
Patient has generalized bruising all over both arms, the bruising appears more significant than yesterday, hardened areas palpated on the bruising that was not present yesterday. Notified Dr Feliz. Plavix and aspirin held.

## 2021-11-10 NOTE — PROGRESS NOTES
"[facility] Hospital Medicine  Progress Note    Patient Name: Olivia Reyes  MRN: [id number]  Patient Class: IP- Inpatient   Admission Date: 12/22/2017  Length of Stay: 1 days  Attending Physician: Polly Garcia MD  Primary Care Provider: No primary care provider on file.        Subjective:     Principal Problem:Acute respiratory failure with hypoxia and hypercarbia    HPI:  Olivia Reyes is a 89 y.o. female patient who presented to the Emergency Department for SOB. Onset gradually 1 week ago. Symptoms constant and worsening. Patient reports over past week that she has been noticing increaed SOB with excursion. Prior treatment neb, evaluation at [facility] after hours, and 1 doxycycline. The  states that the pt "deteriorated 3 hours later", which prompted the ED visit.  No mitigating or exacerbating factors reported. Associated sxs include chest pain and "low grade" fever (99.5). En route, pt was placed on CPAP by EMS.  denies any diaphoresis, dizziness, focal weakness/ numbness, cough, palpitations, leg swelling, n/v/d, and all other sxs at this time. No further complaints or concerns at this time. The patient was evaluated in the Er, CBC neg, CMP notable for hypergycemia and CO2 20. Troponin and BNP elevated. UA neg. ABGs pH7.315, CO2 52.3, PO2 70. Chest Xray: Patchy infiltrates scattered about the left lung.  Question of a mild infiltrate right lung base.  Heart size within normal limits.  Vascular calcification of aorta.  No significant bony findings. The patient was placed on Bipap in ER with improvement in symptoms per patient. The patient admitted to ICU with Bipap for Acute Resp Failure with hypoxemia and hypercap.     Hospital Course:  89 y/u female with PMHx with PMhx for HTN, HLP, DM presents with CHF and possible pneumonia. Troponin trended up and cardiology consulted.   Per Dr. Nolasco, patient with atypical CP and (+) troponin. EKG is unremarkable and echo is normal. " Impression: Drusen (degenerative) of macula, bilateral: H35.363. Plan: For drusen I have recommended patient to call our office immediately if change is noted. I also explained the AREDS vitamin study, and advised patient that it would be beneficial for them to take. I stressed the importance of compliance and regular follow-up appointments. Nurys MORE. Cardiology recommended continue IV diuresis, IV heparin x 48 hours, asa, plavix, statin, b blockers. Recommendations are for conservative therapy, lexiscan stress once diuresed.         Review of Systems   Constitutional: Positive for fatigue.   Eyes: Negative.    Respiratory: Positive for cough, chest tightness and shortness of breath.    Cardiovascular: Negative.    Gastrointestinal: Negative.    Endocrine: Negative.    Genitourinary: Negative.    Musculoskeletal: Negative.    Neurological: Negative.    Hematological: Negative.    Psychiatric/Behavioral: Negative.      Objective:     Vital Signs (Most Recent):  Temp: 97.6 °F (36.4 °C) (12/23/17 1505)  Pulse: 66 (12/23/17 1700)  Resp: 17 (12/23/17 1700)  BP: (!) 179/123 (12/23/17 1700)  SpO2: 97 % (12/23/17 1700) Vital Signs (24h Range):  Temp:  [96.3 °F (35.7 °C)-98 °F (36.7 °C)] 97.6 °F (36.4 °C)  Pulse:  [51-85] 66  Resp:  [15-31] 17  SpO2:  [88 %-98 %] 97 %  BP: (100-193)/() 179/123     Weight: 71 kg (156 lb 8.4 oz)  Body mass index is 26.87 kg/m².    Intake/Output Summary (Last 24 hours) at 12/23/17 2101  Last data filed at 12/23/17 1700   Gross per 24 hour   Intake            709.7 ml   Output             2630 ml   Net          -1920.3 ml      Physical Exam   Constitutional: She is oriented to person, place, and time. She appears well-developed and well-nourished.   HENT:   Head: Normocephalic and atraumatic.   Nose: Nose normal.   Mouth/Throat: Oropharynx is clear and moist. No oropharyngeal exudate.   Eyes: Conjunctivae and EOM are normal. Pupils are equal, round, and reactive to light. Left eye exhibits no discharge. No scleral icterus.   Neck: Normal range of motion. No JVD present. No tracheal deviation present. No thyromegaly present.   Cardiovascular: Normal rate, regular rhythm and normal heart sounds.    No murmur heard.  Pulmonary/Chest: Effort normal. She has wheezes. She has rales.   Abdominal: Soft. Bowel sounds are normal.   Genitourinary:    Genitourinary Comments: hawkins   Musculoskeletal: Normal range of motion. She exhibits no edema or deformity.   Neurological: She is alert and oriented to person, place, and time.   Skin: Skin is warm and dry. Capillary refill takes 2 to 3 seconds.   Psychiatric: She has a normal mood and affect.   Nursing note and vitals reviewed.      Significant Labs:   BMP:   Recent Labs  Lab 12/22/17 2140 12/23/17  0539   * 204*   * 136   K 3.7 3.3*   CL 98 101   CO2 20* 24   BUN 20 20   CREATININE 0.8 0.8   CALCIUM 8.5* 7.7*   MG 2.0  --      CBC:   Recent Labs  Lab 12/22/17 2140 12/23/17  0539   WBC 8.86 5.51   HGB 14.1 10.8*   HCT 42.1 33.1*    156     All pertinent labs within the past 24 hours have been reviewed.    Significant Imaging: I have reviewed and interpreted all pertinent imaging results/findings within the past 24 hours.    Assessment/Plan:      * Acute respiratory failure with hypoxia and hypercarbia    Keep sats> 92%  Repeat ABG, pCO2 now 43. No need for NIPPV  Bronchodilators  Accapella              Diabetes mellitus type 2, uncontrolled    Moderate sliding scale   Detemir  Check a1c  Monitor  Encourage diet   May need additonal teaching        Acute CHF    Appreciate recs from ICU/pulm/cardiology  Possible HFpEF exacerbation  ECHO reviewed  Responded with diuretics  PRELOAD AND AFTERLOAD reduction  Lisinopril 40 mg          Elevated troponin    Cardiology consulted, appreciate recs  Per Dr. Nolasco, Pt with atypical CP and (+) troponin. EKG is unremarkable and echo is normal. recommended continue IV diuresis, IV heparin x 48 hours, asa, plavix, statin, b blockers. Per cardiology, recommendations are for conservative therapy, lexiscan stress once diuresed.        Pneumonia of left lower lobe due to infectious organism    Pulmonary consult, appreciate recs  Bilateral infiltrates appear chronic per pulm though old films not available  More likely volume overload  Empiric abx: Moxifloxacin    Sputum and blood cultures pending  Swallow eval  Procalcitonin                VTE Risk Mitigation         Ordered     heparin 25,000 units in dextrose 5% 250 mL (100 units/mL) infusion; FEMALE  Continuous     Route:  Intravenous        12/23/17 0743     heparin 25,000 units in dextrose 5% 250 mL (100 units/mL) bolus from bag; PRN BOLUS  As needed (PRN)     Route:  Intravenous        12/23/17 0743     heparin 25,000 units in dextrose 5% 250 mL (100 units/mL) bolus from bag; PRN BOLUS  As needed (PRN)     Route:  Intravenous        12/23/17 0743     Medium Risk of VTE  Once      12/22/17 2321     Place sequential compression device  Until discontinued      12/22/17 2321          Critical care time spent on the evaluation and treatment of severe organ dysfunction, review of pertinent labs and imaging studies, discussions with consulting providers and discussions with patient/family: 43 minutes.    Polly Garcia MD  Department of Hospital Medicine   [facility]

## 2023-11-14 NOTE — ASSESSMENT & PLAN NOTE
-Admit to ICU with Bipap  -IV antx vanc and zosyn started in ER, to get x 1 dose each. Patient reports only starting oral antx treatment pta with one dose of doxy.  Change to Aveolox.    -WBCs in ER neg. , Lactic 2.0  -Resp therapy consult with sputum culture   -Blood Cultures pending  -Breathing Treatments q6h   -CBC, BMP in Am  -Monitor   -pulmonary consult.        Detail Level: Zone Render In Strict Bullet Format?: No Initiate Treatment: betamethasone, augmented 0.05 % topical cream \\nQuantity: 50.0 g  Days Supply: 30\\nSig: Apply to affected areas on  neck , arms & trunk BID x 4 weeks